# Patient Record
Sex: FEMALE | Race: WHITE | Employment: FULL TIME | URBAN - METROPOLITAN AREA
[De-identification: names, ages, dates, MRNs, and addresses within clinical notes are randomized per-mention and may not be internally consistent; named-entity substitution may affect disease eponyms.]

---

## 2017-01-04 ENCOUNTER — RADIANT APPOINTMENT (OUTPATIENT)
Dept: GENERAL RADIOLOGY | Facility: CLINIC | Age: 57
End: 2017-01-04
Attending: NURSE PRACTITIONER
Payer: COMMERCIAL

## 2017-01-04 ENCOUNTER — OFFICE VISIT (OUTPATIENT)
Dept: FAMILY MEDICINE | Facility: CLINIC | Age: 57
End: 2017-01-04
Payer: COMMERCIAL

## 2017-01-04 VITALS
OXYGEN SATURATION: 98 % | WEIGHT: 236 LBS | TEMPERATURE: 97.8 F | BODY MASS INDEX: 35.77 KG/M2 | HEART RATE: 74 BPM | RESPIRATION RATE: 18 BRPM | SYSTOLIC BLOOD PRESSURE: 151 MMHG | DIASTOLIC BLOOD PRESSURE: 85 MMHG | HEIGHT: 68 IN

## 2017-01-04 DIAGNOSIS — R05.9 COUGH: ICD-10-CM

## 2017-01-04 PROCEDURE — 71020 XR CHEST 2 VW: CPT

## 2017-01-04 PROCEDURE — 99214 OFFICE O/P EST MOD 30 MIN: CPT | Performed by: NURSE PRACTITIONER

## 2017-01-04 RX ORDER — SUMATRIPTAN 100 MG/1
TABLET, FILM COATED ORAL
Qty: 12 TABLET | Refills: 1 | Status: CANCELLED | OUTPATIENT
Start: 2017-01-04

## 2017-01-04 RX ORDER — CODEINE PHOSPHATE AND GUAIFENESIN 10; 100 MG/5ML; MG/5ML
1-2 SOLUTION ORAL EVERY 6 HOURS PRN
Qty: 120 ML | Refills: 0 | Status: SHIPPED | OUTPATIENT
Start: 2017-01-04 | End: 2017-07-07

## 2017-01-04 RX ORDER — CODEINE PHOSPHATE AND GUAIFENESIN 10; 100 MG/5ML; MG/5ML
1-2 SOLUTION ORAL EVERY 4 HOURS PRN
Qty: 420 ML | Status: CANCELLED | OUTPATIENT
Start: 2017-01-04

## 2017-01-04 RX ORDER — IPRATROPIUM BROMIDE AND ALBUTEROL SULFATE 2.5; .5 MG/3ML; MG/3ML
1 SOLUTION RESPIRATORY (INHALATION) ONCE
Qty: 1 VIAL | Refills: 0
Start: 2017-01-04 | End: 2017-07-07

## 2017-01-04 NOTE — NURSING NOTE
"Chief Complaint   Patient presents with     URI       Initial /85 mmHg  Pulse 74  Temp(Src) 97.8  F (36.6  C) (Tympanic)  Resp 18  Ht 5' 8\" (1.727 m)  Wt 236 lb (107.049 kg)  BMI 35.89 kg/m2  SpO2 98% Estimated body mass index is 35.89 kg/(m^2) as calculated from the following:    Height as of this encounter: 5' 8\" (1.727 m).    Weight as of this encounter: 236 lb (107.049 kg).  BP completed using cuff size: large; Right Arm  Alissa Bain CMA (AAMA)      "

## 2017-01-04 NOTE — PROGRESS NOTES
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    Precipitating or alleviating factors: None    Therapies tried and outcome:  guaifenesin        started coughing   Fatigue   Cough is severe  Sneezing  Watery eyes      Past Medical History   Diagnosis Date     Hypothyroidism      Lower extremity edema      Asthma      Seasonal allergies      S/P JOSEP (total abdominal hysterectomy)      S/P  section      S/P appendectomy      S/P arthroscopic knee surgery      S/P lateral meniscectomy of right knee      Cellulitis      s/p I&D of wound     Hypertension      Melanoma (H) 2015     Past Surgical History   Procedure Laterality Date     Hysterectomy, pap no longer indicated       Appendectomy       Colonoscopy N/A 2016     Procedure: COLONOSCOPY;  Surgeon: Axel Rivera MD;  Location:  GI     Social History   Substance Use Topics     Smoking status: Light Tobacco Smoker     Smokeless tobacco: Never Used     Alcohol Use: 0.0 oz/week     0 Standard drinks or equivalent per week      Comment: rare use     Current Outpatient Prescriptions   Medication Sig Dispense Refill     AMITIZA 24 MCG capsule TAKE 1 CAPSULE BY MOUTH TWICE DAILY WITH MEALS 180 capsule 2     losartan (COZAAR) 100 MG tablet TAKE 1 TABLET BY MOUTH DAILY 90 tablet 1     SUMAtriptan (IMITREX) 100 MG tablet TAKE 1 TABLET BY MOUTH AT ONSET OF HEADACHE AS DIRECTED 12 tablet 1     ondansetron (ZOFRAN) 4 MG tablet Take 1 tablet (4 mg) by  "mouth every 8 hours as needed for nausea 18 tablet 1     hydrochlorothiazide (HYDRODIURIL) 25 MG tablet Take 1 tablet (25 mg) by mouth daily 30 tablet 11     losartan (COZAAR) 100 MG tablet Take 1 tablet (100 mg) by mouth daily 30 tablet 11     budesonide (PULMICORT FLEXHALER) 180 MCG/ACT inhaler Inhale 2 puffs into the lungs 2 times daily 3 Inhaler 3     albuterol (PROAIR HFA, PROVENTIL HFA, VENTOLIN HFA) 108 (90 BASE) MCG/ACT inhaler Inhale 2 puffs into the lungs every 6 hours as needed for shortness of breath / dyspnea or wheezing 3 Inhaler 3     levothyroxine (SYNTHROID, LEVOTHROID) 150 MCG tablet Take 1 tablet (150 mcg) by mouth daily       cyclobenzaprine (FLEXERIL) 5 MG tablet Take 1 tablet (5 mg) by mouth 3 times daily as needed for muscle spasms 42 tablet 2     ibuprofen (ADVIL) 200 MG tablet Take 200 mg by mouth every 4 hours as needed.       MULTIVITAMIN TABS   OR daily  0     CALCIUM 500 + D 500-200 MG-IU OR TABS bid  0     Allergies   Allergen Reactions     Singulair [Montelukast Sodium]      Back aches       Reviewed PMH, med list and allergies.      ROS  Detailed as above     /85 mmHg  Pulse 74  Temp(Src) 97.8  F (36.6  C) (Tympanic)  Resp 18  Ht 5' 8\" (1.727 m)  Wt 236 lb (107.049 kg)  BMI 35.89 kg/m2  SpO2 98%      Physical Exam   Constitutional: She is well-developed, well-nourished, and in no distress.   HENT:   Head: Normocephalic.   Right Ear: Tympanic membrane, external ear and ear canal normal.   Left Ear: Tympanic membrane, external ear and ear canal normal.   Mouth/Throat: Oropharynx is clear and moist. No oropharyngeal exudate.   Posterior oropharynx cobblestoning  Nares edema   Eyes: Conjunctivae are normal.   Neck: Normal range of motion.   Cardiovascular: Normal rate, regular rhythm and normal heart sounds.    No murmur heard.  Pulmonary/Chest: Effort normal and breath sounds normal. No respiratory distress.   Cough noted   Lymphadenopathy:     She has cervical adenopathy. "   Neurological: She is alert.   Skin: Skin is warm and dry.   Psychiatric: Mood and affect normal.   Vitals reviewed.      Assessment and Plan:       ICD-10-CM    1. Cough R05 XR Chest 2 Views     ipratropium - albuterol 0.5 mg/2.5 mg/3 mL (DUONEB) 0.5-2.5 (3) MG/3ML neb solution     guaiFENesin-codeine (ROBITUSSIN AC) 100-10 MG/5ML SOLN solution       Neg CXR, reviewed by me, awaiting rad read   Given neb here in clinic   Suspect this is viral. Will watch and wait   Continue with albuterol inh prn   Symptomatic treatments with OTC cold meds prn   Call or rtc prn       MAURICIO Baker, CNP  [facility]

## 2017-01-31 ENCOUNTER — MYC MEDICAL ADVICE (OUTPATIENT)
Dept: FAMILY MEDICINE | Facility: CLINIC | Age: 57
End: 2017-01-31

## 2017-01-31 DIAGNOSIS — G43.009 MIGRAINE WITHOUT AURA AND WITHOUT STATUS MIGRAINOSUS, NOT INTRACTABLE: Primary | ICD-10-CM

## 2017-01-31 DIAGNOSIS — G43.109 CLASSICAL MIGRAINE: Primary | ICD-10-CM

## 2017-02-01 RX ORDER — SUMATRIPTAN 100 MG/1
TABLET, FILM COATED ORAL
Qty: 12 TABLET | Refills: 0 | Status: SHIPPED | OUTPATIENT
Start: 2017-02-01 | End: 2017-02-09

## 2017-02-01 NOTE — TELEPHONE ENCOUNTER
SUMAtriptan (IMITREX) 100 MG tablet       Last Written Prescription Date: 10/25/2016  Last Fill Quantity: 12, # refills: 1  Last Office Visit with [facility], [facility] or [facility] prescribing provider: 8/15/2016       BP Readings from Last 3 Encounters:   01/04/17 151/85   08/15/16 121/82   02/29/16 126/79

## 2017-02-01 NOTE — TELEPHONE ENCOUNTER
Team,  Please see below.  Does PA need to be done to override quantity?  Please advise.  Marcia CHOPRA RN

## 2017-02-01 NOTE — TELEPHONE ENCOUNTER
Prescription approved per [facility] Refill Protocol.  Zulma Vazquez RN

## 2017-02-04 NOTE — TELEPHONE ENCOUNTER
Possibly needs quantity limit exception. I sent in PA for the quantity to [employer].    Bladimir Deras, CMA

## 2017-02-09 PROBLEM — G43.009 MIGRAINE WITHOUT AURA AND WITHOUT STATUS MIGRAINOSUS, NOT INTRACTABLE: Status: ACTIVE | Noted: 2017-02-09

## 2017-02-09 RX ORDER — SUMATRIPTAN 100 MG/1
TABLET, FILM COATED ORAL
Qty: 9 TABLET | Refills: 15 | Status: SHIPPED | OUTPATIENT
Start: 2017-02-09 | End: 2018-01-09

## 2017-02-09 NOTE — TELEPHONE ENCOUNTER
So PA for increased quantity has been denied. Her benefit plan allows for up to 9 tablets per 23 days and they will not exceed this. I wonder if we should just write a new script that coincides with the insurance quantity limits for 9 tablets instead of 12?    I have a new script pending for the pharmacy that will correct the disp # to 9 tablets, if this would be ok to do please send to patient's [facility] pharmacy.    Bladimir Deras, CMA

## 2017-03-23 ENCOUNTER — TRANSFERRED RECORDS (OUTPATIENT)
Dept: HEALTH INFORMATION MANAGEMENT | Facility: CLINIC | Age: 57
End: 2017-03-23

## 2017-03-28 DIAGNOSIS — I10 BENIGN ESSENTIAL HYPERTENSION: ICD-10-CM

## 2017-03-28 RX ORDER — HYDROCHLOROTHIAZIDE 25 MG/1
25 TABLET ORAL DAILY
Start: 2017-03-28

## 2017-03-28 NOTE — TELEPHONE ENCOUNTER
Pending Prescriptions:                       Disp   Refills    hydrochlorothiazide (HYDRODIURIL) 25 MG t*30 tab*11           Sig: Take 1 tablet (25 mg) by mouth daily          Last Written Prescription Date: 8/15/16  Last Fill Quantity: 30, # refills: 11  Last Office Visit with [facility], [facility] or [facility] prescribing provider: 1/4/17       Potassium   Date Value Ref Range Status   12/02/2015 3.8 3.4 - 5.3 mmol/L Final     Creatinine   Date Value Ref Range Status   12/02/2015 0.81 0.52 - 1.04 mg/dL Final     BP Readings from Last 3 Encounters:   01/04/17 151/85   08/15/16 121/82   02/29/16 126/79

## 2017-05-17 ENCOUNTER — TRANSFERRED RECORDS (OUTPATIENT)
Dept: HEALTH INFORMATION MANAGEMENT | Facility: CLINIC | Age: 57
End: 2017-05-17

## 2017-06-02 ENCOUNTER — TELEPHONE (OUTPATIENT)
Dept: FAMILY MEDICINE | Facility: CLINIC | Age: 57
End: 2017-06-02

## 2017-06-02 DIAGNOSIS — I10 ESSENTIAL HYPERTENSION: ICD-10-CM

## 2017-06-02 RX ORDER — LOSARTAN POTASSIUM 100 MG/1
TABLET ORAL
Qty: 90 TABLET | Refills: 2 | Status: SHIPPED | OUTPATIENT
Start: 2017-06-02 | End: 2017-07-07

## 2017-06-02 NOTE — TELEPHONE ENCOUNTER
Pending Prescriptions:                       Disp   Refills    losartan (COZAAR) 100 MG tablet [Pharmacy*90 tab*0            Sig: TAKE 1 TABLET BY MOUTH DAILY          Last Written Prescription Date: 11/8/16  Last Fill Quantity: 90, # refills: 1  Last Office Visit with [facility], [facility] or [facility] prescribing provider: 1/4/17       Potassium   Date Value Ref Range Status   12/02/2015 3.8 3.4 - 5.3 mmol/L Final     Creatinine   Date Value Ref Range Status   12/02/2015 0.81 0.52 - 1.04 mg/dL Final     BP Readings from Last 3 Encounters:   01/04/17 151/85   08/15/16 121/82   02/29/16 126/79

## 2017-06-02 NOTE — TELEPHONE ENCOUNTER
TCs: Patient's medication has been approved.  Please call the patient and let her know that she is due for Basic Metabolic Panel lab and schedule her for LAB ONLY appointment    Thank you!    Meg August RN

## 2017-06-02 NOTE — TELEPHONE ENCOUNTER
Routing refill request to provider for review/approval because:  Labs not current:  Potassium and Creat are from 2015.  Would you like patient to come in for Potassium and Creat Lab Only.   Please see pended medication.     Meg August RN

## 2017-07-07 ENCOUNTER — OFFICE VISIT (OUTPATIENT)
Dept: FAMILY MEDICINE | Facility: CLINIC | Age: 57
End: 2017-07-07
Payer: COMMERCIAL

## 2017-07-07 VITALS
SYSTOLIC BLOOD PRESSURE: 137 MMHG | BODY MASS INDEX: 34.4 KG/M2 | HEART RATE: 72 BPM | TEMPERATURE: 97.6 F | HEIGHT: 68 IN | OXYGEN SATURATION: 97 % | DIASTOLIC BLOOD PRESSURE: 86 MMHG | WEIGHT: 227 LBS

## 2017-07-07 DIAGNOSIS — M54.2 CERVICALGIA: ICD-10-CM

## 2017-07-07 DIAGNOSIS — I10 BENIGN ESSENTIAL HYPERTENSION: ICD-10-CM

## 2017-07-07 DIAGNOSIS — M79.602 ARM PAIN, LEFT: ICD-10-CM

## 2017-07-07 DIAGNOSIS — I10 ESSENTIAL HYPERTENSION: ICD-10-CM

## 2017-07-07 DIAGNOSIS — J45.31 EXTRINSIC ASTHMA, MILD PERSISTENT, WITH ACUTE EXACERBATION: ICD-10-CM

## 2017-07-07 LAB
ANION GAP SERPL CALCULATED.3IONS-SCNC: 8 MMOL/L (ref 3–14)
BUN SERPL-MCNC: 12 MG/DL (ref 7–30)
CALCIUM SERPL-MCNC: 9.5 MG/DL (ref 8.5–10.1)
CHLORIDE SERPL-SCNC: 106 MMOL/L (ref 94–109)
CO2 SERPL-SCNC: 27 MMOL/L (ref 20–32)
CREAT SERPL-MCNC: 0.73 MG/DL (ref 0.52–1.04)
GFR SERPL CREATININE-BSD FRML MDRD: 82 ML/MIN/1.7M2
GLUCOSE SERPL-MCNC: 113 MG/DL (ref 70–99)
POTASSIUM SERPL-SCNC: 3.7 MMOL/L (ref 3.4–5.3)
SODIUM SERPL-SCNC: 141 MMOL/L (ref 133–144)

## 2017-07-07 PROCEDURE — 80048 BASIC METABOLIC PNL TOTAL CA: CPT | Performed by: INTERNAL MEDICINE

## 2017-07-07 PROCEDURE — 99213 OFFICE O/P EST LOW 20 MIN: CPT | Performed by: INTERNAL MEDICINE

## 2017-07-07 PROCEDURE — 36415 COLL VENOUS BLD VENIPUNCTURE: CPT | Performed by: INTERNAL MEDICINE

## 2017-07-07 RX ORDER — HYDROCHLOROTHIAZIDE 25 MG/1
25 TABLET ORAL DAILY
Qty: 30 TABLET | Refills: 11 | Status: SHIPPED | OUTPATIENT
Start: 2017-07-07 | End: 2018-01-09

## 2017-07-07 RX ORDER — CYCLOBENZAPRINE HCL 5 MG
5 TABLET ORAL 3 TIMES DAILY PRN
Qty: 42 TABLET | Refills: 2 | Status: SHIPPED | OUTPATIENT
Start: 2017-07-07 | End: 2018-01-02

## 2017-07-07 RX ORDER — ALBUTEROL SULFATE 90 UG/1
2 AEROSOL, METERED RESPIRATORY (INHALATION) EVERY 6 HOURS PRN
Qty: 3 INHALER | Refills: 3 | Status: SHIPPED | OUTPATIENT
Start: 2017-07-07 | End: 2018-01-09

## 2017-07-07 RX ORDER — LOSARTAN POTASSIUM 100 MG/1
TABLET ORAL
Qty: 30 TABLET | Refills: 11 | Status: SHIPPED | OUTPATIENT
Start: 2017-07-07 | End: 2018-01-09

## 2017-07-07 NOTE — MR AVS SNAPSHOT
After Visit Summary   7/7/2017    Rhoda Ayala    MRN: [id number]           Patient Information     Date Of Birth          1960        Visit Information        Provider Department      7/7/2017 3:30 PM Fiil Vasquez MD [facility]        Today's Diagnoses     Cervicalgia        Arm pain, left        Benign essential hypertension        Essential hypertension        Extrinsic asthma, mild persistent, with acute exacerbation          Care Instructions    (M54.2) Cervicalgia  Comment: OK to continue flexeril as needed   Plan: cyclobenzaprine (FLEXERIL) 5 MG tablet            (M79.602) Arm pain, left  Comment: as above   Plan: cyclobenzaprine (FLEXERIL) 5 MG tablet            (I10) Benign essential hypertension  Comment: blood pressure is very good - continue current blood pressure medications - losartan and hydrochlorothiazide   Plan:     (I10) Essential hypertension  Comment: as above   Plan:     (J45.31) Extrinsic asthma, mild persistent, with acute exacerbation  Comment: Refill inhalers today -lungs clear  Plan:                Follow-ups after your visit        Who to contact     If you have questions or need follow up information about today's clinic visit or your schedule please contact [facility] directly at [phone].  Normal or non-critical lab and imaging results will be communicated to you by MyChart, letter or phone within 4 business days after the clinic has received the results. If you do not hear from us within 7 days, please contact the clinic through [facility] or phone. If you have a critical or abnormal lab result, we will notify you by phone as soon as possible.  Submit refill requests through [employer] or call your pharmacy and they will forward the refill request to us. Please allow 3 business days for your refill to be completed.          Additional Information About Your Visit        [employer] Information     [employer] gives you secure access to your  "electronic health record. If you see a primary care provider, you can also send messages to your care team and make appointments. If you have questions, please call your primary care clinic.  If you do not have a primary care provider, please call [phone] and they will assist you.        Care EveryWhere ID     This is your Care EveryWhere ID. This could be used by other organizations to access your [facility] medical records  [id number]        Your Vitals Were     Pulse Temperature Height Pulse Oximetry Breastfeeding? BMI (Body Mass Index)    72 97.6  F (36.4  C) (Tympanic) 5' 8\" (1.727 m) 97% No 34.52 kg/m2       Blood Pressure from Last 3 Encounters:   07/07/17 137/86   01/04/17 151/85   08/15/16 121/82    Weight from Last 3 Encounters:   07/07/17 227 lb (103 kg)   01/04/17 236 lb (107 kg)   08/15/16 238 lb 14.4 oz (108.4 kg)              We Performed the Following     Basic metabolic panel          Today's Medication Changes          These changes are accurate as of: 7/7/17  4:06 PM.  If you have any questions, ask your nurse or doctor.               These medicines have changed or have updated prescriptions.        Dose/Directions    losartan 100 MG tablet   Commonly known as:  COZAAR   This may have changed:  Another medication with the same name was removed. Continue taking this medication, and follow the directions you see here.   Used for:  Essential hypertension   Changed by:  Fili Vasquez MD        TAKE 1 TABLET BY MOUTH DAILY. Patient due for labs (potassium and creatinine). Please call to schedule lab only appointment.   Quantity:  30 tablet   Refills:  11            Where to get your medicines      These medications were sent to [facility] [address] [facility] [address] AT [place] [address]  [address] [place] [id number]    Hours:  24-hours Phone:  [phone]     albuterol 108 (90 BASE) MCG/ACT Inhaler    budesonide 180 MCG/ACT inhaler "    cyclobenzaprine 5 MG tablet    hydrochlorothiazide 25 MG tablet    losartan 100 MG tablet                Primary Care Provider Office Phone # Fax #    Fili Vasquez [phone]       [facility] [address] [facility] [address]  [facility] [address]        Equal Access to Services     INGRID NEGRETE : Hadii aad ku hadasho Soomaali, waaxda luqadaha, qaybta kaalmada adeegyada, waxay aurelioin haynallelyn adeclarence tonjose guadalupe sanderson. So [facility] [phone].    ATENCIÓN: Si habla español, tiene a xiong disposición servicios gratuitos de asistencia lingüística. [facility] [phone].    We comply with applicable federal civil rights laws and [place] laws. We do not discriminate on the basis of race, color, national origin, age, disability sex, sexual orientation or gender identity.            Thank you!     Thank you for choosing [facility]  for your care. Our goal is always to provide you with excellent care. Hearing back from our patients is one way we can continue to improve our services. Please take a few minutes to complete the written survey that you may receive in the mail after your visit with us. Thank you!             Your Updated Medication List - Protect others around you: Learn how to safely use, store and throw away your medicines at www.disposemymeds.org.          This list is accurate as of: 7/7/17  4:06 PM.  Always use your most recent med list.                   Brand Name Dispense Instructions for use Diagnosis    ADVIL 200 MG tablet   Generic drug:  ibuprofen      Take 200 mg by mouth every 4 hours as needed.        albuterol 108 (90 BASE) MCG/ACT Inhaler    PROAIR HFA/PROVENTIL HFA/VENTOLIN HFA    3 Inhaler    Inhale 2 puffs into the lungs every 6 hours as needed for shortness of breath / dyspnea or wheezing    Extrinsic asthma, mild persistent, with acute exacerbation       AMITIZA 24 MCG capsule   Generic drug:  lubiprostone     180 capsule    TAKE 1 CAPSULE BY MOUTH TWICE  DAILY WITH MEALS    Irritable bowel syndrome, unspecified type       budesonide 180 MCG/ACT inhaler    PULMICORT FLEXHALER    3 Inhaler    Inhale 2 puffs into the lungs 2 times daily    Extrinsic asthma, mild persistent, with acute exacerbation       CALCIUM 500 + D 500-200 MG-IU Tabs      bid        cyclobenzaprine 5 MG tablet    FLEXERIL    42 tablet    Take 1 tablet (5 mg) by mouth 3 times daily as needed for muscle spasms    Cervicalgia, Arm pain, left       hydrochlorothiazide 25 MG tablet    HYDRODIURIL    30 tablet    Take 1 tablet (25 mg) by mouth daily    Benign essential hypertension       losartan 100 MG tablet    COZAAR    30 tablet    TAKE 1 TABLET BY MOUTH DAILY. Patient due for labs (potassium and creatinine). Please call to schedule lab only appointment.    Essential hypertension       MULTIVITAMIN TABS   OR      daily        ondansetron 4 MG tablet    ZOFRAN    18 tablet    Take 1 tablet (4 mg) by mouth every 8 hours as needed for nausea    Nausea       SUMAtriptan 100 MG tablet    IMITREX    9 tablet    TAKE 1 TABLET BY MOUTH AT ONSET OF HEADACHE AS DIRECTED - 9 tablets to last 23 days    Migraine without aura and without status migrainosus, not intractable       SYNTHROID 150 MCG tablet   Generic drug:  levothyroxine      Take 1 tablet (150 mcg) by mouth daily

## 2017-07-07 NOTE — NURSING NOTE
"Chief Complaint   Patient presents with     Hypertension       Initial /86 (BP Location: Left arm, Patient Position: Sitting, Cuff Size: Adult Large)  Pulse 72  Temp 97.6  F (36.4  C) (Tympanic)  Ht 5' 8\" (1.727 m)  Wt 227 lb (103 kg)  SpO2 97%  Breastfeeding? No  BMI 34.52 kg/m2 Estimated body mass index is 34.52 kg/(m^2) as calculated from the following:    Height as of this encounter: 5' 8\" (1.727 m).    Weight as of this encounter: 227 lb (103 kg).  Medication Reconciliation: complete   Karmen Gallegosing- CMA      "

## 2017-07-07 NOTE — PROGRESS NOTES
"[facility]  CLINIC PROGRESS NOTE    Subjective:   Hypothyroidism   Has had improvement with recent dose increase - following with endocrinology  Hypertension    Rhoda Ayala returns to the clinic for follow up of her blood pressure.  She feels well and no side effects from her medications.  She is walking regularly and is on her feet regularly.     Past medical history, medications, allergies, social history, family history reviewed and updated in [facility] as of 7/7/2017 .    ROS  CONSTITUTIONAL: no fatigue, no unexpected change in weight  SKIN: no concerns  EYES: no acute vision problems or changes  ENT: occasional allergies   RESP: no significant cough, no shortness of breath  CV: no chest pain, no palpitations, no new or worsening peripheral edema  GI: no nausea, no vomiting, no constipation, no diarrhea  : no frequency, no dysuria, no hematuria  MS: right shoulder/subscapular pain - may be associated with  or shoveling   PSYCHIATRIC: no changes in mood or affect      Objective:  Vitals  /86 (BP Location: Left arm, Patient Position: Sitting, Cuff Size: Adult Large)  Pulse 72  Temp 97.6  F (36.4  C) (Tympanic)  Ht 5' 8\" (1.727 m)  Wt 227 lb (103 kg)  SpO2 97%  Breastfeeding? No  BMI 34.52 kg/m2  GEN: Alert Oriented x3 NAD  HEENT: Atraumatic, normocephalic, neck supple, no thyromegaly, negative cervical adenopathy  TM: TM bilaterally pearly and grey with normal light reflex  CV: RRR no murmurs or rubs  PULM: CTA no wheezes or crackles  ABD: Soft, nontender nondistended, no hepatosplenomegally  SKIN: No visible skin lesion or ulcerations  EXT: 2+ dorsal pedis pulses, no edema bilateral lower extremities  NEURO: Gait and station deferred, No focal neurologic deficits  PSYCH: Mood good, affect mood congruent    No results found for this or any previous visit (from the past 24 hour(s)).    Assessment/Plan:  Patient Instructions   (M54.2) Cervicalgia  Comment: OK to continue flexeril as " needed   Plan: cyclobenzaprine (FLEXERIL) 5 MG tablet            (M79.602) Arm pain, left  Comment: as above   Plan: cyclobenzaprine (FLEXERIL) 5 MG tablet            (I10) Benign essential hypertension  Comment: blood pressure is very good - continue current blood pressure medications - losartan and hydrochlorothiazide   Plan:     (I10) Essential hypertension  Comment: as above   Plan:     (J45.31) Extrinsic asthma, mild persistent, with acute exacerbation  Comment: Refill inhalers today -lungs clear  Plan:          Follow up in 3 months    Disclaimer: This note consists of symbols derived from keyboarding, dictation and/or voice recognition software. As a result, there may be errors in the script that have gone undetected. Please consider this when interpreting information found in this chart.    Fili Vasquez MD  [phone]

## 2017-07-07 NOTE — PATIENT INSTRUCTIONS
(M54.2) Cervicalgia  Comment: OK to continue flexeril as needed   Plan: cyclobenzaprine (FLEXERIL) 5 MG tablet            (M79.602) Arm pain, left  Comment: as above   Plan: cyclobenzaprine (FLEXERIL) 5 MG tablet            (I10) Benign essential hypertension  Comment: blood pressure is very good - continue current blood pressure medications - losartan and hydrochlorothiazide   Plan:     (I10) Essential hypertension  Comment: as above   Plan:     (J45.31) Extrinsic asthma, mild persistent, with acute exacerbation  Comment: Refill inhalers today -lungs clear  Plan:

## 2017-07-09 NOTE — PROGRESS NOTES
Onesimo Duong,    I have had the opportunity to review your recent results and an interpretation is as follows:  Stable renal function     Sincerely,  Fili Vasquez MD

## 2017-07-11 ENCOUNTER — TELEPHONE (OUTPATIENT)
Dept: FAMILY MEDICINE | Facility: CLINIC | Age: 57
End: 2017-07-11

## 2017-07-11 NOTE — TELEPHONE ENCOUNTER
ACT Asthma Control Test not done at recent appt per weekly quality report.  Mailed to pt.  Unable to do over the phone due to copyright laws.  Christine Weller MA

## 2017-07-11 NOTE — LETTER
"[facility]  [address] [facility]  [address]  [place]  [address]  Tel: [phone]    July 11, 2017    Rhoda Ayala  [address] [place] [address]        Dear Ms. Ayala,    We missed doing your Asthma Control Test when you were in for your recent appointment.     Please complete the enclosed \"ACT\"and either     mail back to us  or  fax [phone] back to us  or  call [phone] us with your answers (ask for any of the nurses/ med assist).     Due to copyright laws we are unable to ask you the questions over the phone without the form visible to you.         Sincerely,    Christine NYE MA on behalf of Fili Vasquez MD        Enc: ACT form      "

## 2017-07-18 ASSESSMENT — ASTHMA QUESTIONNAIRES: ACT_TOTALSCORE: 21

## 2017-08-26 ENCOUNTER — HEALTH MAINTENANCE LETTER (OUTPATIENT)
Age: 57
End: 2017-08-26

## 2017-08-30 DIAGNOSIS — I10 BENIGN ESSENTIAL HYPERTENSION: ICD-10-CM

## 2017-08-30 RX ORDER — HYDROCHLOROTHIAZIDE 25 MG/1
TABLET ORAL
Qty: 30 TABLET | Refills: 0 | OUTPATIENT
Start: 2017-08-30

## 2017-09-06 ENCOUNTER — TRANSFERRED RECORDS (OUTPATIENT)
Dept: HEALTH INFORMATION MANAGEMENT | Facility: CLINIC | Age: 57
End: 2017-09-06

## 2018-01-02 DIAGNOSIS — M79.602 ARM PAIN, LEFT: ICD-10-CM

## 2018-01-02 DIAGNOSIS — M54.2 CERVICALGIA: ICD-10-CM

## 2018-01-02 NOTE — TELEPHONE ENCOUNTER
Requested Prescriptions   Pending Prescriptions Disp Refills     cyclobenzaprine (FLEXERIL) 5 MG tablet [Pharmacy Med Name: CYCLOBENZAPRINE 5MG TABLETS] 42 tablet 0     Sig: TAKE 1 TABLET(5 MG) BY MOUTH THREE TIMES DAILY AS NEEDED FOR MUSCLE SPASMS    There is no refill protocol information for this order        cyclobenzaprine (FLEXERIL) 5 MG tablet      Last Written Prescription Date:  7/07/17  Last Fill Quantity: 42 tablet,   # refills: 2  Last Office Visit: 7/07/17 Christina  Future Office visit:       Routing refill request to provider for review/approval because:  Drug not on the [facility], P or [facility] refill protocol or controlled substance

## 2018-01-03 RX ORDER — CYCLOBENZAPRINE HCL 5 MG
TABLET ORAL
Qty: 42 TABLET | Refills: 0 | Status: SHIPPED | OUTPATIENT
Start: 2018-01-03 | End: 2018-05-20

## 2018-01-03 NOTE — TELEPHONE ENCOUNTER
Routing to TC to contact patient to inform due for appointment. (last OV 7/7/17, follow up 3 months). Please route to PCP once scheduled.     (Routing refill request to provider for review/approval because: Drug not on the [facility] refill protocol)    Myrna UGALDE RN

## 2018-01-09 ENCOUNTER — OFFICE VISIT (OUTPATIENT)
Dept: FAMILY MEDICINE | Facility: CLINIC | Age: 58
End: 2018-01-09
Payer: COMMERCIAL

## 2018-01-09 VITALS
OXYGEN SATURATION: 94 % | TEMPERATURE: 98 F | WEIGHT: 227 LBS | DIASTOLIC BLOOD PRESSURE: 80 MMHG | BODY MASS INDEX: 34.4 KG/M2 | SYSTOLIC BLOOD PRESSURE: 132 MMHG | HEIGHT: 68 IN | HEART RATE: 66 BPM

## 2018-01-09 DIAGNOSIS — E03.9 HYPOTHYROIDISM, UNSPECIFIED TYPE: ICD-10-CM

## 2018-01-09 DIAGNOSIS — J45.31 EXTRINSIC ASTHMA, MILD PERSISTENT, WITH ACUTE EXACERBATION: ICD-10-CM

## 2018-01-09 DIAGNOSIS — J45.21 MILD INTERMITTENT EXTRINSIC ASTHMA WITH ACUTE EXACERBATION: ICD-10-CM

## 2018-01-09 DIAGNOSIS — Z13.6 CARDIOVASCULAR SCREENING; LDL GOAL LESS THAN 130: Primary | ICD-10-CM

## 2018-01-09 DIAGNOSIS — I10 ESSENTIAL HYPERTENSION: ICD-10-CM

## 2018-01-09 DIAGNOSIS — G43.009 MIGRAINE WITHOUT AURA AND WITHOUT STATUS MIGRAINOSUS, NOT INTRACTABLE: ICD-10-CM

## 2018-01-09 DIAGNOSIS — I10 BENIGN ESSENTIAL HYPERTENSION: ICD-10-CM

## 2018-01-09 LAB
ERYTHROCYTE [DISTWIDTH] IN BLOOD BY AUTOMATED COUNT: 13.4 % (ref 10–15)
HCT VFR BLD AUTO: 39.2 % (ref 35–47)
HGB BLD-MCNC: 13.3 G/DL (ref 11.7–15.7)
MCH RBC QN AUTO: 30.3 PG (ref 26.5–33)
MCHC RBC AUTO-ENTMCNC: 33.9 G/DL (ref 31.5–36.5)
MCV RBC AUTO: 89 FL (ref 78–100)
PLATELET # BLD AUTO: 336 10E9/L (ref 150–450)
RBC # BLD AUTO: 4.39 10E12/L (ref 3.8–5.2)
WBC # BLD AUTO: 10.5 10E9/L (ref 4–11)

## 2018-01-09 PROCEDURE — 80053 COMPREHEN METABOLIC PANEL: CPT | Performed by: INTERNAL MEDICINE

## 2018-01-09 PROCEDURE — 36415 COLL VENOUS BLD VENIPUNCTURE: CPT | Performed by: INTERNAL MEDICINE

## 2018-01-09 PROCEDURE — 85027 COMPLETE CBC AUTOMATED: CPT | Performed by: INTERNAL MEDICINE

## 2018-01-09 PROCEDURE — 80061 LIPID PANEL: CPT | Performed by: INTERNAL MEDICINE

## 2018-01-09 PROCEDURE — 99213 OFFICE O/P EST LOW 20 MIN: CPT | Performed by: INTERNAL MEDICINE

## 2018-01-09 RX ORDER — ALBUTEROL SULFATE 90 UG/1
2 AEROSOL, METERED RESPIRATORY (INHALATION) EVERY 6 HOURS PRN
Qty: 1 INHALER | Refills: 11 | Status: SHIPPED | OUTPATIENT
Start: 2018-01-09 | End: 2019-02-24

## 2018-01-09 RX ORDER — LEVOTHYROXINE SODIUM 150 UG/1
175 TABLET ORAL
COMMUNITY
End: 2018-12-05

## 2018-01-09 RX ORDER — HYDROCHLOROTHIAZIDE 25 MG/1
25 TABLET ORAL DAILY
Qty: 30 TABLET | Refills: 11 | Status: SHIPPED | OUTPATIENT
Start: 2018-01-09 | End: 2018-12-21

## 2018-01-09 RX ORDER — LOSARTAN POTASSIUM 100 MG/1
TABLET ORAL
Qty: 30 TABLET | Refills: 11 | Status: SHIPPED | OUTPATIENT
Start: 2018-01-09 | End: 2018-12-21

## 2018-01-09 RX ORDER — SUMATRIPTAN 100 MG/1
TABLET, FILM COATED ORAL
Qty: 9 TABLET | Refills: 15 | Status: SHIPPED | OUTPATIENT
Start: 2018-01-09 | End: 2019-01-10

## 2018-01-09 NOTE — MR AVS SNAPSHOT
After Visit Summary   1/9/2018    Rhoda Ayala    MRN: [id number]           Patient Information     Date Of Birth          1960        Visit Information        Provider Department      1/9/2018 11:30 AM Fili Vasquez MD [facility]        Today's Diagnoses     CARDIOVASCULAR SCREENING; LDL GOAL LESS THAN 130    -  1    Extrinsic asthma, mild persistent, with acute exacerbation        Migraine without aura and without status migrainosus, not intractable        Benign essential hypertension        Essential hypertension        Hypothyroidism, unspecified type        Mild intermittent extrinsic asthma with acute exacerbation          Care Instructions    (Z13.6) CARDIOVASCULAR SCREENING; LDL GOAL LESS THAN 130  (primary encounter diagnosis)  Comment: We will check fasting lipid panel today and manage accordingly  Plan: Lipid panel reflex to direct LDL Fasting, CBC         with platelets            (J45.31) Extrinsic asthma, mild persistent, with acute exacerbation  Comment: Lungs were clear today - continue current inhalers  Plan: budesonide (PULMICORT FLEXHALER) 180 MCG/ACT         inhaler, albuterol (PROAIR HFA/PROVENTIL         HFA/VENTOLIN HFA) 108 (90 BASE) MCG/ACT Inhaler            (G43.009) Migraine without aura and without status migrainosus, not intractable  Comment: also stable - no change in dosing of immitrex   Plan: SUMAtriptan (IMITREX) 100 MG tablet            (I10) Benign essential hypertension  Comment: blood pressure is excellent - continue hydrochlorothiazide and losartan  Plan: hydrochlorothiazide (HYDRODIURIL) 25 MG tablet            (I10) Essential hypertension  Comment: as above   Plan: losartan (COZAAR) 100 MG tablet, Comprehensive         metabolic panel, CBC with platelets            (E03.9) Hypothyroidism, unspecified type  Comment: check TSH with endocrinology   Plan:     (J45.21) Mild intermittent extrinsic asthma with acute exacerbation  Comment:  "Stable - no acute issues  Plan:              Follow-ups after your visit        Who to contact     If you have questions or need follow up information about today's clinic visit or your schedule please contact [facility] directly at [phone].  Normal or non-critical lab and imaging results will be communicated to you by myRetehart, letter or phone within 4 business days after the clinic has received the results. If you do not hear from us within 7 days, please contact the clinic through myRetehart or phone. If you have a critical or abnormal lab result, we will notify you by phone as soon as possible.  Submit refill requests through Manifact or call your pharmacy and they will forward the refill request to us. Please allow 3 business days for your refill to be completed.          Additional Information About Your Visit        myReteharACell Information     Manifact gives you secure access to your electronic health record. If you see a primary care provider, you can also send messages to your care team and make appointments. If you have questions, please call your primary care clinic.  If you do not have a primary care provider, please call [phone] and they will assist you.        Care EveryWhere ID     This is your Care EveryWhere ID. This could be used by other organizations to access your [facility] medical records  [id number]        Your Vitals Were     Pulse Temperature Height Pulse Oximetry Breastfeeding? BMI (Body Mass Index)    66 98  F (36.7  C) (Oral) 5' 8\" (1.727 m) 94% No 34.52 kg/m2       Blood Pressure from Last 3 Encounters:   01/09/18 132/80   07/07/17 137/86   01/04/17 151/85    Weight from Last 3 Encounters:   01/09/18 227 lb (103 kg)   07/07/17 227 lb (103 kg)   01/04/17 236 lb (107 kg)              We Performed the Following     CBC with platelets     Comprehensive metabolic panel     Lipid panel reflex to direct LDL Fasting          Today's Medication Changes          These changes are " accurate as of: 1/9/18 11:58 AM.  If you have any questions, ask your nurse or doctor.               These medicines have changed or have updated prescriptions.        Dose/Directions    levothyroxine 150 MCG tablet   Commonly known as:  SYNTHROID/LEVOTHROID   This may have changed:    - how much to take  - how to take this  - when to take this  - additional instructions   Changed by:  Fili Vasquez MD        Taking 1 tablet by mouth daily, except one daily each week will take 1.5 tablet daily.   Refills:  0            Where to get your medicines      These medications were sent to [facility] [address] [place] [address] [id number] [place] [address] AT [place] [address]  [address] [facility] [address]    Hours:  24-hours Phone:  [phone]     albuterol 108 (90 BASE) MCG/ACT Inhaler    budesonide 180 MCG/ACT inhaler    hydrochlorothiazide 25 MG tablet    losartan 100 MG tablet    SUMAtriptan 100 MG tablet                Primary Care Provider Office Phone # Fax #    Fili Vasqeuz [phone] [address]  [place] [address]        Equal Access to Services     [facility] Center[facility]RE : Hadii aad ku hadasho Soomaali, waaxda luqadaha, qaybta kaalmada adeegyada, waxay aurelioin haynallelyn raul sanderson. So [facility] [phone].    ATENCIÓN: Si habla español, tiene a xiong disposición servicios gratuitos de asistencia lingüística. Noemi al [phone].    We comply with applicable federal civil rights laws and [place] laws. We do not discriminate on the basis of race, color, national origin, age, disability, sex, sexual orientation, or gender identity.            Thank you!     Thank you for choosing [facility]  for your care. Our goal is always to provide you with excellent care. Hearing back from our patients is one way we can continue to improve our services. Please take a few minutes to complete the written survey that you may receive in the  mail after your visit with us. Thank you!             Your Updated Medication List - Protect others around you: Learn how to safely use, store and throw away your medicines at www.disposemymeds.org.          This list is accurate as of: 1/9/18 11:58 AM.  Always use your most recent med list.                   Brand Name Dispense Instructions for use Diagnosis    ADVIL 200 MG tablet   Generic drug:  ibuprofen      Take 200 mg by mouth every 4 hours as needed.        albuterol 108 (90 BASE) MCG/ACT Inhaler    PROAIR HFA/PROVENTIL HFA/VENTOLIN HFA    1 Inhaler    Inhale 2 puffs into the lungs every 6 hours as needed for shortness of breath / dyspnea or wheezing    Extrinsic asthma, mild persistent, with acute exacerbation       AMITIZA 24 MCG capsule   Generic drug:  lubiprostone     180 capsule    TAKE 1 CAPSULE BY MOUTH TWICE DAILY WITH MEALS    Irritable bowel syndrome, unspecified type       budesonide 180 MCG/ACT inhaler    PULMICORT FLEXHALER    1 Inhaler    Inhale 2 puffs into the lungs 2 times daily    Extrinsic asthma, mild persistent, with acute exacerbation       CALCIUM 500 + D 500-200 MG-IU Tabs      bid        cyclobenzaprine 5 MG tablet    FLEXERIL    42 tablet    TAKE 1 TABLET(5 MG) BY MOUTH THREE TIMES DAILY AS NEEDED FOR MUSCLE SPASMS    Cervicalgia, Arm pain, left       hydrochlorothiazide 25 MG tablet    HYDRODIURIL    30 tablet    Take 1 tablet (25 mg) by mouth daily    Benign essential hypertension       levothyroxine 150 MCG tablet    SYNTHROID/LEVOTHROID     Taking 1 tablet by mouth daily, except one daily each week will take 1.5 tablet daily.        losartan 100 MG tablet    COZAAR    30 tablet    TAKE 1 TABLET BY MOUTH DAILY. Patient due for labs (potassium and creatinine). Please call to schedule lab only appointment.    Essential hypertension       MULTIVITAMIN TABS   OR      daily        ondansetron 4 MG tablet    ZOFRAN    18 tablet    Take 1 tablet (4 mg) by mouth every 8 hours as needed  for nausea    Nausea       SUMAtriptan 100 MG tablet    IMITREX    9 tablet    TAKE 1 TABLET BY MOUTH AT ONSET OF HEADACHE AS DIRECTED - 9 tablets to last 23 days    Migraine without aura and without status migrainosus, not intractable

## 2018-01-09 NOTE — PROGRESS NOTES
"[facility]  CLINIC PROGRESS NOTE    Subjective:  Hypertension   Rhoda Ayala returns to the clinic today for blood pressure management.  She did see endocrinology and was noted to have good blood pressure reading at that time.  Her thyroid dose was increased 150 mcg daily plus an addition 75 mcg once per week.  Asthma   Doing well.   She did have a upper respiratory infection the week before [place], but otherwise things have been going well.     Past medical history, medications, allergies, social history, family history reviewed and updated in [facility] as of 1/9/2018 .    ROS  CONSTITUTIONAL: no fatigue, no unexpected change in weight  SKIN: no worrisome rashes, no worrisome moles, no worrisome lesions  EYES: no acute vision problems or changes  ENT: no ear problems, no mouth problems, no throat problems  RESP: as above   CV: no chest pain, no palpitations, no new or worsening peripheral edema  GI: no nausea, no vomiting, no constipation, no diarrhea  : no frequency, no dysuria, no hematuria  MS: no claudication, no myalgias, no joint aches  PSYCHIATRIC: no changes in mood or affect      Objective:  Vitals  /80  Pulse 66  Temp 98  F (36.7  C) (Oral)  Ht 5' 8\" (1.727 m)  Wt 227 lb (103 kg)  SpO2 94%  Breastfeeding? No  BMI 34.52 kg/m2  GEN: Alert Oriented x3 NAD  HEENT: Atraumatic, normocephalic, neck supple, no thyromegaly, negative cervical adenopathy  TM: TM bilaterally pearly and grey with normal light reflex  CV: RRR no murmurs or rubs  PULM: CTA no wheezes or crackles  ABD: Soft, nontender nondistended, no hepatosplenomegally  SKIN: No visible skin lesion or ulcerations  EXT:  no edema bilateral lower extremities  NEURO: Gait and station deferred, No focal neurologic deficits  PSYCH: Mood good, affect mood congruent    No images are attached to the encounter.    No results found for this or any previous visit (from the past 24 hour(s)).    Assessment/Plan:  Patient Instructions "   (Z13.6) CARDIOVASCULAR SCREENING; LDL GOAL LESS THAN 130  (primary encounter diagnosis)  Comment: We will check fasting lipid panel today and manage accordingly  Plan: Lipid panel reflex to direct LDL Fasting, CBC         with platelets            (J45.31) Extrinsic asthma, mild persistent, with acute exacerbation  Comment: Lungs were clear today - continue current inhalers  Plan: budesonide (PULMICORT FLEXHALER) 180 MCG/ACT         inhaler, albuterol (PROAIR HFA/PROVENTIL         HFA/VENTOLIN HFA) 108 (90 BASE) MCG/ACT Inhaler            (G43.009) Migraine without aura and without status migrainosus, not intractable  Comment: also stable - no change in dosing of immitrex   Plan: SUMAtriptan (IMITREX) 100 MG tablet            (I10) Benign essential hypertension  Comment: blood pressure is excellent - continue hydrochlorothiazide and losartan  Plan: hydrochlorothiazide (HYDRODIURIL) 25 MG tablet            (I10) Essential hypertension  Comment: as above   Plan: losartan (COZAAR) 100 MG tablet, Comprehensive         metabolic panel, CBC with platelets            (E03.9) Hypothyroidism, unspecified type  Comment: check TSH with endocrinology   Plan:     (J45.21) Mild intermittent extrinsic asthma with acute exacerbation  Comment: Stable - no acute issues  Plan:        Follow up in 6 months    Disclaimer: This note consists of symbols derived from keyboarding, dictation and/or voice recognition software. As a result, there may be errors in the script that have gone undetected. Please consider this when interpreting information found in this chart.    Fili Vasquez MD  [phone]

## 2018-01-09 NOTE — PATIENT INSTRUCTIONS
(Z13.6) CARDIOVASCULAR SCREENING; LDL GOAL LESS THAN 130  (primary encounter diagnosis)  Comment: We will check fasting lipid panel today and manage accordingly  Plan: Lipid panel reflex to direct LDL Fasting, CBC         with platelets            (J45.31) Extrinsic asthma, mild persistent, with acute exacerbation  Comment: Lungs were clear today - continue current inhalers  Plan: budesonide (PULMICORT FLEXHALER) 180 MCG/ACT         inhaler, albuterol (PROAIR HFA/PROVENTIL         HFA/VENTOLIN HFA) 108 (90 BASE) MCG/ACT Inhaler            (G43.009) Migraine without aura and without status migrainosus, not intractable  Comment: also stable - no change in dosing of immitrex   Plan: SUMAtriptan (IMITREX) 100 MG tablet            (I10) Benign essential hypertension  Comment: blood pressure is excellent - continue hydrochlorothiazide and losartan  Plan: hydrochlorothiazide (HYDRODIURIL) 25 MG tablet            (I10) Essential hypertension  Comment: as above   Plan: losartan (COZAAR) 100 MG tablet, Comprehensive         metabolic panel, CBC with platelets            (E03.9) Hypothyroidism, unspecified type  Comment: check TSH with endocrinology   Plan:     (J45.21) Mild intermittent extrinsic asthma with acute exacerbation  Comment: Stable - no acute issues  Plan:

## 2018-01-09 NOTE — NURSING NOTE
"Chief Complaint   Patient presents with     Recheck Medication     Hypertension     follow-up for this, not checking BP outside of clinic but a few weeks ago saw Marian and reports normal BP       Initial /86  Pulse 66  Temp 98  F (36.7  C) (Oral)  Ht 5' 8\" (1.727 m)  Wt 227 lb (103 kg)  SpO2 94%  Breastfeeding? No  BMI 34.52 kg/m2 Estimated body mass index is 34.52 kg/(m^2) as calculated from the following:    Height as of this encounter: 5' 8\" (1.727 m).    Weight as of this encounter: 227 lb (103 kg).  Medication Reconciliation: complete     Bladimir Deras CMA     "

## 2018-01-10 LAB
ALBUMIN SERPL-MCNC: 3.8 G/DL (ref 3.4–5)
ALP SERPL-CCNC: 90 U/L (ref 40–150)
ALT SERPL W P-5'-P-CCNC: 35 U/L (ref 0–50)
ANION GAP SERPL CALCULATED.3IONS-SCNC: 8 MMOL/L (ref 3–14)
AST SERPL W P-5'-P-CCNC: 23 U/L (ref 0–45)
BILIRUB SERPL-MCNC: 0.4 MG/DL (ref 0.2–1.3)
BUN SERPL-MCNC: 14 MG/DL (ref 7–30)
CALCIUM SERPL-MCNC: 9.2 MG/DL (ref 8.5–10.1)
CHLORIDE SERPL-SCNC: 105 MMOL/L (ref 94–109)
CHOLEST SERPL-MCNC: 190 MG/DL
CO2 SERPL-SCNC: 28 MMOL/L (ref 20–32)
CREAT SERPL-MCNC: 0.77 MG/DL (ref 0.52–1.04)
GFR SERPL CREATININE-BSD FRML MDRD: 77 ML/MIN/1.7M2
GLUCOSE SERPL-MCNC: 96 MG/DL (ref 70–99)
HDLC SERPL-MCNC: 33 MG/DL
LDLC SERPL CALC-MCNC: 100 MG/DL
NONHDLC SERPL-MCNC: 157 MG/DL
POTASSIUM SERPL-SCNC: 3.8 MMOL/L (ref 3.4–5.3)
PROT SERPL-MCNC: 7.2 G/DL (ref 6.8–8.8)
SODIUM SERPL-SCNC: 141 MMOL/L (ref 133–144)
TRIGL SERPL-MCNC: 284 MG/DL

## 2018-01-11 NOTE — PROGRESS NOTES
Onesimo Duong,    I had the opportunity to review your recent labs and a summary of your labs reads as follows:    Your complete blood counts show no sign of anemia, normal white blood cell count and platelets.  Your comprehensive metabolic panel showed normal renal function, normal liver function, and normal fasting blood glucose indicating no evidence of diabetes mellitus.  Your fasting lipid panel show  - low HDL (good) cholesterol -as your goal is greater than 40  - low LDL (bad) cholesterol as your goal is less than 130  - improved triglyceride levels    Congratulaions on your excellent results        Sincerely,  Fili Vasquez MD

## 2018-01-15 ENCOUNTER — TELEPHONE (OUTPATIENT)
Dept: FAMILY MEDICINE | Facility: CLINIC | Age: 58
End: 2018-01-15

## 2018-01-15 NOTE — TELEPHONE ENCOUNTER
ACT Asthma Control Test not done at recent appt per weekly quality report.  Mailed to pt.  Unable to do over the phone or via MY CHART due to copyright laws.  Christine Weller MA

## 2018-01-15 NOTE — LETTER
"[facility]  [address] [facility]  [address]  [place]  [address]  Tel: [phone]    January 15, 2018    Rhoda Ayala  [address] [facility] [address] [address] [facility] [address]        Dear Ms. Ayala,    We missed doing your Asthma Control Test when you were in for your recent appointment.     Please complete the enclosed \"ACT\"and either     mail back to us  or  fax [phone] back to us  or  call [phone] us with your answers (ask for any of the nurses/ med assist).     Due to copyright laws we are unable to ask you the questions over the phone without the form visible to you.   We are unable to do via PowerMessage also.        Sincerely,    Christine NYE MA on behalf of Fili Vasquez MD        Enc: ACT form      "

## 2018-01-19 ENCOUNTER — OFFICE VISIT (OUTPATIENT)
Dept: FAMILY MEDICINE | Facility: CLINIC | Age: 58
End: 2018-01-19
Payer: COMMERCIAL

## 2018-01-19 VITALS
DIASTOLIC BLOOD PRESSURE: 93 MMHG | BODY MASS INDEX: 35.01 KG/M2 | HEIGHT: 68 IN | OXYGEN SATURATION: 96 % | TEMPERATURE: 98 F | SYSTOLIC BLOOD PRESSURE: 166 MMHG | HEART RATE: 72 BPM | WEIGHT: 231 LBS

## 2018-01-19 DIAGNOSIS — J45.21 MILD INTERMITTENT EXTRINSIC ASTHMA WITH ACUTE EXACERBATION: ICD-10-CM

## 2018-01-19 DIAGNOSIS — J06.9 UPPER RESPIRATORY TRACT INFECTION, UNSPECIFIED TYPE: Primary | ICD-10-CM

## 2018-01-19 PROCEDURE — 99213 OFFICE O/P EST LOW 20 MIN: CPT | Performed by: INTERNAL MEDICINE

## 2018-01-19 RX ORDER — PREDNISONE 20 MG/1
40 TABLET ORAL DAILY
Qty: 5 TABLET | Refills: 0 | Status: SHIPPED | OUTPATIENT
Start: 2018-01-19 | End: 2018-01-24

## 2018-01-19 RX ORDER — DOXYCYCLINE 100 MG/1
100 CAPSULE ORAL 2 TIMES DAILY
Qty: 28 CAPSULE | Refills: 0 | Status: SHIPPED | OUTPATIENT
Start: 2018-01-19 | End: 2018-02-02

## 2018-01-19 RX ORDER — ALBUTEROL SULFATE 0.83 MG/ML
1 SOLUTION RESPIRATORY (INHALATION) EVERY 6 HOURS PRN
Qty: 25 VIAL | Refills: 11 | Status: SHIPPED | OUTPATIENT
Start: 2018-01-19 | End: 2020-11-10

## 2018-01-19 NOTE — NURSING NOTE
"Chief Complaint   Patient presents with     URI       Initial BP (!) 166/93 (BP Location: Left arm, Cuff Size: Adult Large)  Pulse 72  Temp 98  F (36.7  C) (Oral)  Ht 5' 8\" (1.727 m)  Wt 231 lb (104.8 kg)  SpO2 96%  BMI 35.12 kg/m2 Estimated body mass index is 35.12 kg/(m^2) as calculated from the following:    Height as of this encounter: 5' 8\" (1.727 m).    Weight as of this encounter: 231 lb (104.8 kg).  Medication Reconciliation: complete       Ignacia Fry CMA      "

## 2018-01-19 NOTE — PATIENT INSTRUCTIONS
(J06.9) Upper respiratory tract infection, unspecified type  (primary encounter diagnosis)  Comment: noted worsening asthma likely relate to upper respiratory infection - possible influenza versus bacterial source.  We will treat empiricaly with doxycycline 100 mg twice per day x 7 days and prednisone burst.   Recommend rest, use of neti-pot and mucinex over the counter. Follow up in 1 week if not improved.  Plan: doxycycline monohydrate 100 MG capsule,         predniSONE (DELTASONE) 20 MG tablet            (J45.21) Mild intermittent extrinsic asthma with acute exacerbation  Comment: as above - letter written stating medical illness causing absence from work.  Possible flare of asthma periodically during the year  Plan: albuterol (2.5 MG/3ML) 0.083% neb solution,         predniSONE (DELTASONE) 20 MG tablet

## 2018-01-19 NOTE — MR AVS SNAPSHOT
After Visit Summary   1/19/2018    Rhoda Ayala    MRN: [id number]           Patient Information     Date Of Birth          1960        Visit Information        Provider Department      1/19/2018 4:00 PM Fili Vasquez MD [facility]        Today's Diagnoses     Upper respiratory tract infection, unspecified type    -  1    Mild intermittent extrinsic asthma with acute exacerbation          Care Instructions    (J06.9) Upper respiratory tract infection, unspecified type  (primary encounter diagnosis)  Comment: noted worsening asthma likely relate to upper respiratory infection - possible influenza versus bacterial source.  We will treat empiricaly with doxycycline 100 mg twice per day x 7 days and prednisone burst.   Recommend rest, use of neti-pot and mucinex over the counter. Follow up in 1 week if not improved.  Plan: doxycycline monohydrate 100 MG capsule,         predniSONE (DELTASONE) 20 MG tablet            (J45.21) Mild intermittent extrinsic asthma with acute exacerbation  Comment: as above - letter written stating medical illness causing absence from work.  Possible flare of asthma periodically during the year  Plan: albuterol (2.5 MG/3ML) 0.083% neb solution,         predniSONE (DELTASONE) 20 MG tablet                     Follow-ups after your visit        Who to contact     If you have questions or need follow up information about today's clinic visit or your schedule please contact [facility] directly at [phone].  Normal or non-critical lab and imaging results will be communicated to you by MyChart, letter or phone within 4 business days after the clinic has received the results. If you do not hear from us within 7 days, please contact the clinic through MyChart or phone. If you have a critical or abnormal lab result, we will notify you by phone as soon as possible.  Submit refill requests through [employer]lin[employer] or call your pharmacy and they will  "forward the refill request to us. Please allow 3 business days for your refill to be completed.          Additional Information About Your Visit        [employer] Information     [employer] gives you secure access to your electronic health record. If you see a primary care provider, you can also send messages to your care team and make appointments. If you have questions, please call your primary care clinic.  If you do not have a primary care provider, please call [phone] and they will assist you.        Care EveryWhere ID     This is your Care EveryWhere ID. This could be used by other organizations to access your [facility] medical records  [id number]        Your Vitals Were     Pulse Temperature Height Pulse Oximetry BMI (Body Mass Index)       72 98  F (36.7  C) (Oral) 5' 8\" (1.727 m) 96% 35.12 kg/m2        Blood Pressure from Last 3 Encounters:   01/19/18 (!) 166/93   01/09/18 132/80   07/07/17 137/86    Weight from Last 3 Encounters:   01/19/18 231 lb (104.8 kg)   01/09/18 227 lb (103 kg)   07/07/17 227 lb (103 kg)              Today, you had the following     No orders found for display         Today's Medication Changes          These changes are accurate as of: 1/19/18  4:30 PM.  If you have any questions, ask your nurse or doctor.               Start taking these medicines.        Dose/Directions    doxycycline monohydrate 100 MG capsule   Used for:  Upper respiratory tract infection, unspecified type   Started by:  Fili Vasquez MD        Dose:  100 mg   Take 1 capsule (100 mg) by mouth 2 times daily for 14 days   Quantity:  28 capsule   Refills:  0       predniSONE 20 MG tablet   Commonly known as:  DELTASONE   Used for:  Upper respiratory tract infection, unspecified type, Mild intermittent extrinsic asthma with acute exacerbation   Started by:  Fili Vasquez MD        Dose:  40 mg   Take 2 tablets (40 mg) by mouth daily for 5 days   Quantity:  5 tablet   Refills:  0         These " medicines have changed or have updated prescriptions.        Dose/Directions    * albuterol 108 (90 BASE) MCG/ACT Inhaler   Commonly known as:  PROAIR HFA/PROVENTIL HFA/VENTOLIN HFA   This may have changed:  Another medication with the same name was added. Make sure you understand how and when to take each.   Used for:  Extrinsic asthma, mild persistent, with acute exacerbation   Changed by:  Fili Vasquez MD        Dose:  2 puff   Inhale 2 puffs into the lungs every 6 hours as needed for shortness of breath / dyspnea or wheezing   Quantity:  1 Inhaler   Refills:  11       * albuterol (2.5 MG/3ML) 0.083% neb solution   This may have changed:  You were already taking a medication with the same name, and this prescription was added. Make sure you understand how and when to take each.   Used for:  Mild intermittent extrinsic asthma with acute exacerbation   Changed by:  Fili Vasquez MD        Dose:  1 vial   Take 1 vial (2.5 mg) by nebulization every 6 hours as needed for shortness of breath / dyspnea or wheezing   Quantity:  25 vial   Refills:  11       * Notice:  This list has 2 medication(s) that are the same as other medications prescribed for you. Read the directions carefully, and ask your doctor or other care provider to review them with you.         Where to get your medicines      These medications were sent to [facility] [address] [place] [id number]    Hours:  24-hours Phone:  [phone]     albuterol (2.5 MG/3ML) 0.083% neb solution    doxycycline monohydrate 100 MG capsule    predniSONE 20 MG tablet                Primary Care Provider Office Phone # Fax #    Fili Vasquez [phone] [address]  [facility] [address]        Equal Access to Services     INGRID NEGRETE AH: Alexia Goodman, rich bronson, osbaldo ch,  reinaldo marie kadie salas'aan ah. So [facility] [phone].    ATENCIÓN: Si habla cynthia, tiene a xiong disposición servicios gratuitos de asistencia lingüística. Noemi panchal [phone].    We comply with applicable federal civil rights laws and [place] laws. We do not discriminate on the basis of race, color, national origin, age, disability, sex, sexual orientation, or gender identity.            Thank you!     Thank you for choosing [facility]  for your care. Our goal is always to provide you with excellent care. Hearing back from our patients is one way we can continue to improve our services. Please take a few minutes to complete the written survey that you may receive in the mail after your visit with us. Thank you!             Your Updated Medication List - Protect others around you: Learn how to safely use, store and throw away your medicines at www.disposemymeds.org.          This list is accurate as of: 1/19/18  4:30 PM.  Always use your most recent med list.                   Brand Name Dispense Instructions for use Diagnosis    ADVIL 200 MG tablet   Generic drug:  ibuprofen      Take 200 mg by mouth every 4 hours as needed.        * albuterol 108 (90 BASE) MCG/ACT Inhaler    PROAIR HFA/PROVENTIL HFA/VENTOLIN HFA    1 Inhaler    Inhale 2 puffs into the lungs every 6 hours as needed for shortness of breath / dyspnea or wheezing    Extrinsic asthma, mild persistent, with acute exacerbation       * albuterol (2.5 MG/3ML) 0.083% neb solution     25 vial    Take 1 vial (2.5 mg) by nebulization every 6 hours as needed for shortness of breath / dyspnea or wheezing    Mild intermittent extrinsic asthma with acute exacerbation       AMITIZA 24 MCG capsule   Generic drug:  lubiprostone     180 capsule    TAKE 1 CAPSULE BY MOUTH TWICE DAILY WITH MEALS    Irritable bowel syndrome, unspecified type       budesonide 180 MCG/ACT inhaler    PULMICORT FLEXHALER    1 Inhaler    Inhale 2 puffs into the lungs  2 times daily    Extrinsic asthma, mild persistent, with acute exacerbation       CALCIUM 500 + D 500-200 MG-IU Tabs      bid        cyclobenzaprine 5 MG tablet    FLEXERIL    42 tablet    TAKE 1 TABLET(5 MG) BY MOUTH THREE TIMES DAILY AS NEEDED FOR MUSCLE SPASMS    Cervicalgia, Arm pain, left       doxycycline monohydrate 100 MG capsule     28 capsule    Take 1 capsule (100 mg) by mouth 2 times daily for 14 days    Upper respiratory tract infection, unspecified type       hydrochlorothiazide 25 MG tablet    HYDRODIURIL    30 tablet    Take 1 tablet (25 mg) by mouth daily    Benign essential hypertension       levothyroxine 150 MCG tablet    SYNTHROID/LEVOTHROID     Taking 1 tablet by mouth daily, except one daily each week will take 1.5 tablet daily.        losartan 100 MG tablet    COZAAR    30 tablet    TAKE 1 TABLET BY MOUTH DAILY. Patient due for labs (potassium and creatinine). Please call to schedule lab only appointment.    Essential hypertension       MULTIVITAMIN TABS   OR      daily        ondansetron 4 MG tablet    ZOFRAN    18 tablet    Take 1 tablet (4 mg) by mouth every 8 hours as needed for nausea    Nausea       predniSONE 20 MG tablet    DELTASONE    5 tablet    Take 2 tablets (40 mg) by mouth daily for 5 days    Upper respiratory tract infection, unspecified type, Mild intermittent extrinsic asthma with acute exacerbation       SUMAtriptan 100 MG tablet    IMITREX    9 tablet    TAKE 1 TABLET BY MOUTH AT ONSET OF HEADACHE AS DIRECTED - 9 tablets to last 23 days    Migraine without aura and without status migrainosus, not intractable       * Notice:  This list has 2 medication(s) that are the same as other medications prescribed for you. Read the directions carefully, and ask your doctor or other care provider to review them with you.

## 2018-01-19 NOTE — PROGRESS NOTES
"    SUBJECTIVE:   Rhoda Ayala is a 57 year old female who presents to clinic today for the following health issues:      RESPIRATORY SYMPTOMS      Duration: 8 days    Description  nasal congestion, sore throat, facial pain/pressure, cough, wheezing, chills, headache, fatigue/malaise, hoarse voice, myalgias, conjunctival irritation and nausea    Severity: moderate    Accompanying signs and symptoms: None    History (predisposing factors):  asthma    Precipitating or alleviating factors: None    Therapies tried and outcome:  [facility]  CLINIC PROGRESS NOTE    Subjective:  Upper respiratory infection   Rhoda Ayala has been suffering with sinus pressure, shortness of breath, nasal drainage and wheezing.  She did take Sunday off and worked Wednesday through Thursday.   She has been exposed to flu recently.  She feels as though she is getting better, but still having post-nasal drip.      Past medical history, medications, allergies, social history, family history reviewed and updated in [facility] as of 1/19/2018 .    ROS  CONSTITUTIONAL: no fatigue, no unexpected change in weight  SKIN: no worrisome rashes, no worrisome moles, no worrisome lesions  EYES: no acute vision problems or changes  ENT: no ear problems, no mouth problems, no throat problems  RESP: as above   CV: no chest pain, no palpitations, no new or worsening peripheral edema  GI: no nausea, no vomiting, no constipation, no diarrhea  : no frequency, no dysuria, no hematuria  MS: no claudication, no myalgias, no joint aches  PSYCHIATRIC: no changes in mood or affect      Objective:  Vitals  BP (!) 166/93 (BP Location: Left arm, Cuff Size: Adult Large)  Pulse 72  Temp 98  F (36.7  C) (Oral)  Ht 5' 8\" (1.727 m)  Wt 231 lb (104.8 kg)  SpO2 96%  BMI 35.12 kg/m2  GEN: Alert Oriented x3 NAD  HEENT: Atraumatic, normocephalic, neck supple, no thyromegaly, negative cervical adenopathy  TM: TM bilaterally pearly and grey with normal " light reflex  CV: RRR no murmurs or rubs  PULM: Diffuse wheezing all lung fields  ABD: Soft, nontender nondistended, no hepatosplenomegally  SKIN: No visible skin lesion or ulcerations  EXT: 2+ dorsal pedis pulses, no edema bilateral lower extremities  NEURO: Gait and station deferred, No focal neurologic deficits  PSYCH: Mood good, affect mood congruent    No images are attached to the encounter.    No results found for this or any previous visit (from the past 24 hour(s)).    Assessment/Plan:  Patient Instructions   (J06.9) Upper respiratory tract infection, unspecified type  (primary encounter diagnosis)  Comment: noted worsening asthma likely relate to upper respiratory infection - possible influenza versus bacterial source.  We will treat empiricaly with doxycycline 100 mg twice per day x 7 days and prednisone burst.   Recommend rest, use of neti-pot and mucinex over the counter. Follow up in 1 week if not improved.  Plan: doxycycline monohydrate 100 MG capsule,         predniSONE (DELTASONE) 20 MG tablet            (J45.21) Mild intermittent extrinsic asthma with acute exacerbation  Comment: as above - letter written stating medical illness causing absence from work.  Possible flare of asthma periodically during the year  Plan: albuterol (2.5 MG/3ML) 0.083% neb solution,         predniSONE (DELTASONE) 20 MG tablet               Follow up in 1 week if not improved    Disclaimer: This note consists of symbols derived from keyboarding, dictation and/or voice recognition software. As a result, there may be errors in the script that have gone undetected. Please consider this when interpreting information found in this chart.    Fili Vasquez MD  [phone]

## 2018-01-19 NOTE — LETTER
[address] [address]       To whom it may concern,    Rhoda Ayala was seen in the clinic today 1/19/2018 for acute asthma exacerbation in the setting of an upper respiratory infection.  She did miss work on 01/14/18 for this illness.  I anticipate she will be able to return back to work on Friday 01/26/18.      Sincerely,  Fili Vasquez MD  1/19/2018

## 2018-01-23 ASSESSMENT — ASTHMA QUESTIONNAIRES: ACT_TOTALSCORE: 11

## 2018-01-30 ENCOUNTER — TELEPHONE (OUTPATIENT)
Dept: FAMILY MEDICINE | Facility: CLINIC | Age: 58
End: 2018-01-30

## 2018-01-30 NOTE — TELEPHONE ENCOUNTER
Reason for Call:  Form, our goal is to have forms completed with 72 hours, however, some forms may require a visit or additional information.    Type of letter, form or note:  Leave of Absence    Who is the form from?: Patient    Where did the form come from: form was faxed in    What clinic location was the form placed at?: [facility]    Where the form was placed: not sure    What number is listed as a contact on the form?: n/a       Additional comments: patient called and said the [employer] Forms that were Faxed was missing 3rd page please refax any questions please call patient at [phone]    Call taken on 1/30/2018 at 10:53 AM by Nancy Hsu

## 2018-01-30 NOTE — TELEPHONE ENCOUNTER
Do you recall these forms?  There are no copies of it in Media or in the accordion files.       Mona Romero MA

## 2018-01-31 ENCOUNTER — TELEPHONE (OUTPATIENT)
Dept: FAMILY MEDICINE | Facility: CLINIC | Age: 58
End: 2018-01-31

## 2018-01-31 NOTE — TELEPHONE ENCOUNTER
Spoke with Medical records to check if forms were received to be scanned. States scanning process is running about 1 week behind. Will continue to check Media.    If patient could fax back the KEREN forms that they do have along with a blank KEREN form, Dr. Vasquez may be able to complete again.  Left message for patient to return call to clinic to review the plan with her.    Tonya Pozo MA

## 2018-01-31 NOTE — TELEPHONE ENCOUNTER
Reason for Call:  Form, our goal is to have forms completed with 72 hours, however, some forms may require a visit or additional information.    Type of letter, form or note:  GORDY      What number is listed as a contact on the form?: [phone]       Additional comments: she said when this was faxed over the 3rd page was missing  Can you please re fax this   Thank you    Call taken on 1/31/2018 at 12:57 PM by Grady Frankel

## 2018-02-02 NOTE — TELEPHONE ENCOUNTER
The patient brought the forms in today and said that the 3rd page was faxed to [employer] but that we wanted a copy of the form and a blank one   Forms placed on Dr Vasquez's desk in the in Basket

## 2018-02-12 DIAGNOSIS — G43.009 MIGRAINE WITHOUT AURA AND WITHOUT STATUS MIGRAINOSUS, NOT INTRACTABLE: ICD-10-CM

## 2018-02-12 NOTE — TELEPHONE ENCOUNTER
"  SUMAtriptan (IMITREX) 100 MG tablet 9 tablet 15 1/9/2018       Last Written Prescription Date:  01/09/2018  Last Fill Quantity: 9,  # refills: 15   Last office visit: 1/19/2018 with prescribing provider:     Future Office Visit:  Unknown    Requested Prescriptions   Pending Prescriptions Disp Refills     SUMAtriptan (IMITREX) 100 MG tablet [Pharmacy Med Name: SUMATRIPTAN 100MG TABLETS] 9 tablet 0     Sig: TAKE 1 TABLET BY MOUTH AT ONSET OF HEADACHE AS DIRECTED    Serotonin Agonists Failed    2/12/2018  4:57 AM       Failed - Blood pressure under 140/90    BP Readings from Last 3 Encounters:   01/19/18 (!) 166/93   01/09/18 132/80   07/07/17 137/86                Failed - Serotonin Agonist request needs review.    Please review patient's record. If patient has had 8 or more treatments in the past month, please forward to provider.         Passed - Recent or future visit with authorizing provider's specialty    Patient had office visit in the last year or has a visit in the next 30 days with authorizing provider.  See \"Patient Info\" tab in inbasket, or \"Choose Columns\" in Meds & Orders section of the refill encounter.            Passed - Patient is age 18 or older       Passed - No active pregnancy on record       Passed - No positive pregnancy test in past 12 months          "

## 2018-02-14 RX ORDER — SUMATRIPTAN 100 MG/1
TABLET, FILM COATED ORAL
Start: 2018-02-14

## 2018-03-06 ENCOUNTER — TRANSFERRED RECORDS (OUTPATIENT)
Dept: HEALTH INFORMATION MANAGEMENT | Facility: CLINIC | Age: 58
End: 2018-03-06

## 2018-03-10 DIAGNOSIS — I10 ESSENTIAL HYPERTENSION: ICD-10-CM

## 2018-03-10 NOTE — TELEPHONE ENCOUNTER
"Last Written Prescription Date:  1/09/18  Last Fill Quantity: 30 tablet,  # refills: 11   Last office visit: 1/19/2018 with prescribing provider:  Christina   Future Office Visit:      Requested Prescriptions   Pending Prescriptions Disp Refills     losartan (COZAAR) 100 MG tablet [Pharmacy Med Name: LOSARTAN 100MG TABLETS] 90 tablet 0     Sig: TAKE 1 TABLET BY MOUTH EVERY DAY    Angiotensin-II Receptors Failed    3/10/2018  6:56 AM       Failed - Blood pressure under 140/90 in past 12 months    BP Readings from Last 3 Encounters:   01/19/18 (!) 166/93   01/09/18 132/80   07/07/17 137/86                Passed - Recent (12 mo) or future (30 days) visit within the authorizing provider's specialty    Patient had office visit in the last 12 months or has a visit in the next 30 days with authorizing provider or within the authorizing provider's specialty.  See \"Patient Info\" tab in inbasket, or \"Choose Columns\" in Meds & Orders section of the refill encounter.           Passed - Patient is age 18 or older       Passed - No active pregnancy on record       Passed - Normal serum creatinine on file in past 12 months    Recent Labs   Lab Test  01/09/18   1226   CR  0.77            Passed - Normal serum potassium on file in past 12 months    Recent Labs   Lab Test  01/09/18   1226   POTASSIUM  3.8                   Passed - No positive pregnancy test in past 12 months          "

## 2018-03-13 RX ORDER — LOSARTAN POTASSIUM 100 MG/1
TABLET ORAL
Qty: 90 TABLET | Refills: 2 | Status: SHIPPED | OUTPATIENT
Start: 2018-03-13 | End: 2018-12-21

## 2018-04-11 ENCOUNTER — TRANSFERRED RECORDS (OUTPATIENT)
Dept: HEALTH INFORMATION MANAGEMENT | Facility: CLINIC | Age: 58
End: 2018-04-11

## 2018-05-20 DIAGNOSIS — M54.2 CERVICALGIA: ICD-10-CM

## 2018-05-20 DIAGNOSIS — M79.602 ARM PAIN, LEFT: ICD-10-CM

## 2018-05-21 NOTE — TELEPHONE ENCOUNTER
cyclobenzaprine (FLEXERIL) 5 MG tablet  Last Written Prescription Date:  1/3/18  Last Fill Quantity: 42,  # refills: 0   Last office visit: 1/19/2018 with prescribing provider:  Christina   Future Office Visit:        Routing refill request to provider for review/approval because:  Drug not on the [facility], [facility] or [facility] refill protocol or controlled substance

## 2018-05-21 NOTE — TELEPHONE ENCOUNTER
Routing refill request to provider for review/approval because:  Drug not on the [facility] refill protocol   Marcia CHOPRA RN

## 2018-05-22 RX ORDER — CYCLOBENZAPRINE HCL 5 MG
TABLET ORAL
Qty: 42 TABLET | Refills: 0 | Status: SHIPPED | OUTPATIENT
Start: 2018-05-22 | End: 2019-12-18

## 2018-09-27 ENCOUNTER — TRANSFERRED RECORDS (OUTPATIENT)
Dept: HEALTH INFORMATION MANAGEMENT | Facility: CLINIC | Age: 58
End: 2018-09-27

## 2018-10-03 ENCOUNTER — TRANSFERRED RECORDS (OUTPATIENT)
Dept: HEALTH INFORMATION MANAGEMENT | Facility: CLINIC | Age: 58
End: 2018-10-03

## 2018-12-05 ENCOUNTER — OFFICE VISIT (OUTPATIENT)
Dept: FAMILY MEDICINE | Facility: CLINIC | Age: 58
End: 2018-12-05
Payer: COMMERCIAL

## 2018-12-05 VITALS
DIASTOLIC BLOOD PRESSURE: 98 MMHG | SYSTOLIC BLOOD PRESSURE: 174 MMHG | BODY MASS INDEX: 35.01 KG/M2 | WEIGHT: 231 LBS | HEART RATE: 72 BPM | HEIGHT: 68 IN | OXYGEN SATURATION: 96 %

## 2018-12-05 DIAGNOSIS — I10 ESSENTIAL HYPERTENSION: ICD-10-CM

## 2018-12-05 DIAGNOSIS — J06.9 UPPER RESPIRATORY TRACT INFECTION, UNSPECIFIED TYPE: Primary | ICD-10-CM

## 2018-12-05 PROBLEM — E66.01 MORBID OBESITY (H): Status: ACTIVE | Noted: 2018-12-05

## 2018-12-05 PROCEDURE — 99213 OFFICE O/P EST LOW 20 MIN: CPT | Performed by: INTERNAL MEDICINE

## 2018-12-05 RX ORDER — AZITHROMYCIN 250 MG/1
TABLET, FILM COATED ORAL
Qty: 6 TABLET | Refills: 0 | Status: SHIPPED | OUTPATIENT
Start: 2018-12-05 | End: 2018-12-21

## 2018-12-05 NOTE — MR AVS SNAPSHOT
After Visit Summary   12/5/2018    Rhoda Ayala    MRN: [id number]           Patient Information     Date Of Birth          1960        Visit Information        Provider Department      12/5/2018 12:00 PM Fili Vasquez MD [facility] [place]        Today's Diagnoses     Upper respiratory tract infection, unspecified type    -  1    Essential hypertension          Care Instructions    (J06.9) Upper respiratory tract infection, unspecified type  (primary encounter diagnosis)  Comment: We will treat empirically with azithromycin.  I would recommend nasal saline rinse or neti-pot.  For decongestant we can use over the counter mucinex (gauifenasin) and over the counter claritin (loratadine)   Plan: azithromycin (ZITHROMAX) 250 MG tablet            (I10) Essential hypertension  Comment: blood pressure is ellevated but likely due to recent thyroid dose change and addition of phenylephrine.  Hold all products with phenylephrine or sudafed and we will follow up in 2 weeks for recheck and physical  Plan:              Follow-ups after your visit        Follow-up notes from your care team     Return in about 2 weeks (around 12/19/2018) for Physical Exam.      Who to contact     If you have questions or need follow up information about today's clinic visit or your schedule please contact [facility] directly at [phone].  Normal or non-critical lab and imaging results will be communicated to you by MyChart, letter or phone within 4 business days after the clinic has received the results. If you do not hear from us within 7 days, please contact the clinic through MyChart or phone. If you have a critical or abnormal lab result, we will notify you by phone as soon as possible.  Submit refill requests through [employer] or call your pharmacy and they will forward the refill request to us. Please allow 3 business days for your refill to be completed.          Additional Information About  "Your Visit        [employer] Information     [employer] gives you secure access to your electronic health record. If you see a primary care provider, you can also send messages to your care team and make appointments. If you have questions, please call your primary care clinic.  If you do not have a primary care provider, please call [phone] and they will assist you.        Care EveryWhere ID     This is your Care EveryWhere ID. This could be used by other organizations to access your [place] medical records  [id number]        Your Vitals Were     Pulse Height Pulse Oximetry Breastfeeding? BMI (Body Mass Index)       72 5' 8\" (1.727 m) 96% No 35.12 kg/m2        Blood Pressure from Last 3 Encounters:   12/05/18 (!) 174/98   01/19/18 (!) 166/93   01/09/18 132/80    Weight from Last 3 Encounters:   12/05/18 231 lb (104.8 kg)   01/19/18 231 lb (104.8 kg)   01/09/18 227 lb (103 kg)              Today, you had the following     No orders found for display         Today's Medication Changes          These changes are accurate as of 12/5/18 12:28 PM.  If you have any questions, ask your nurse or doctor.               Start taking these medicines.        Dose/Directions    azithromycin 250 MG tablet   Commonly known as:  ZITHROMAX   Used for:  Upper respiratory tract infection, unspecified type   Started by:  Fili Vasquez MD        Two tablets first day, then one tablet daily for four days.   Quantity:  6 tablet   Refills:  0         These medicines have changed or have updated prescriptions.        Dose/Directions    LEVOTHROID PO   This may have changed:  Another medication with the same name was removed. Continue taking this medication, and follow the directions you see here.   Changed by:  Fili Vasquez MD        Dose:  175 mcg   Take 175 mcg by mouth   Refills:  0            Where to get your medicines      These medications were sent to [facility] [address] [place] [id number] [address] AT [place] [address]  [address] [address], [facility] [address]     Phone:  [phone]     azithromycin 250 MG tablet                Primary Care Provider Office Phone # Fax #    Fili Vasquez [phone] [address] [facility] [address]  [place] [address]        Equal Access to Services     [facility]: Hadii aad ku hadasho Soomaali, waaxda luqadaha, qaybta kaalmada adeegyada, waxay idiin hayaan adeeg kharash la'aan ah. So [facility] [phone].    ATENCIÓN: Si habla espdat, tiene a xiong disposición servicios gratuitos de asistencia lingüística. Noemi al [phone].    We comply with applicable federal civil rights laws and [place] laws. We do not discriminate on the basis of race, color, national origin, age, disability, sex, sexual orientation, or gender identity.            Thank you!     Thank you for choosing [facility]  for your care. Our goal is always to provide you with excellent care. Hearing back from our patients is one way we can continue to improve our services. Please take a few minutes to complete the written survey that you may receive in the mail after your visit with us. Thank you!             Your Updated Medication List - Protect others around you: Learn how to safely use, store and throw away your medicines at www.disposemymeds.org.          This list is accurate as of 12/5/18 12:28 PM.  Always use your most recent med list.                   Brand Name Dispense Instructions for use Diagnosis    ADVIL 200 MG tablet   Generic drug:  ibuprofen      Take 200 mg by mouth every 4 hours as needed.        * albuterol 108 (90 Base) MCG/ACT inhaler    PROAIR HFA/PROVENTIL HFA/VENTOLIN HFA    1 Inhaler    Inhale 2 puffs into the lungs every 6 hours as needed for shortness of breath / dyspnea or wheezing    Extrinsic asthma, mild persistent, with acute exacerbation       * albuterol (2.5 MG/3ML) 0.083% neb solution    PROVENTIL    25 vial     Take 1 vial (2.5 mg) by nebulization every 6 hours as needed for shortness of breath / dyspnea or wheezing    Mild intermittent extrinsic asthma with acute exacerbation       AMITIZA 24 MCG capsule   Generic drug:  lubiprostone     180 capsule    TAKE 1 CAPSULE BY MOUTH TWICE DAILY WITH MEALS    Irritable bowel syndrome, unspecified type       azithromycin 250 MG tablet    ZITHROMAX    6 tablet    Two tablets first day, then one tablet daily for four days.    Upper respiratory tract infection, unspecified type       budesonide 180 MCG/ACT inhaler    PULMICORT FLEXHALER    1 Inhaler    Inhale 2 puffs into the lungs 2 times daily    Extrinsic asthma, mild persistent, with acute exacerbation       CALCIUM 500 + D 500-200 MG-IU Tabs      bid        cyclobenzaprine 5 MG tablet    FLEXERIL    42 tablet    TAKE 1 TABLET(5 MG) BY MOUTH THREE TIMES DAILY AS NEEDED FOR MUSCLE SPASMS    Cervicalgia, Arm pain, left       hydrochlorothiazide 25 MG tablet    HYDRODIURIL    30 tablet    Take 1 tablet (25 mg) by mouth daily    Benign essential hypertension       LEVOTHROID PO      Take 175 mcg by mouth        * losartan 100 MG tablet    COZAAR    30 tablet    TAKE 1 TABLET BY MOUTH DAILY. Patient due for labs (potassium and creatinine). Please call to schedule lab only appointment.    Essential hypertension       * losartan 100 MG tablet    COZAAR    90 tablet    TAKE 1 TABLET BY MOUTH EVERY DAY    Essential hypertension       MULTIVITAMIN TABS   OR      daily        ondansetron 4 MG tablet    ZOFRAN    18 tablet    Take 1 tablet (4 mg) by mouth every 8 hours as needed for nausea    Nausea       SUMAtriptan 100 MG tablet    IMITREX    9 tablet    TAKE 1 TABLET BY MOUTH AT ONSET OF HEADACHE AS DIRECTED - 9 tablets to last 23 days    Migraine without aura and without status migrainosus, not intractable       * Notice:  This list has 4 medication(s) that are the same as other medications prescribed for you. Read the directions  carefully, and ask your doctor or other care provider to review them with you.

## 2018-12-05 NOTE — PROGRESS NOTES
"  SUBJECTIVE:   Rhoda Ayala is a 58 year old female who presents to clinic today for the following health issues:      Right ear pain for 3 to days  URi and cough    [facility]  CLINIC PROGRESS NOTE    Subjective:  Upper respiratory infection    Rhoda Ayala recently was babysitting her grand-daughter and about one week later she developed symptoms of sore throat, post-nasal drainage, wheezing and now ear pain.   Pain is in the right ear.  Pain started about 3-4 days ago.  No fevers, but feels achy.  She is taking phenylephrine.       Past medical history, medications, allergies, social history, family history reviewed and updated in [facility] as of 12/5/2018 .    ROS  CONSTITUTIONAL: no fatigue, no unexpected change in weight  SKIN: no worrisome rashes, no worrisome moles, no worrisome lesions  EYES: no acute vision problems or changes  ENT: as above   RESP: slight cough, no shortness of breath  CV: no chest pain, no palpitations, no new or worsening peripheral edema  GI: no nausea, no vomiting, no constipation, no diarrhea  : no frequency, no dysuria, no hematuria  MS: diffuse body aches  PSYCHIATRIC: no changes in mood or affect      Objective:  Vitals  BP (!) 174/98 (BP Location: Left arm, Patient Position: Chair, Cuff Size: Adult Large)  Pulse 72  Ht 5' 8\" (1.727 m)  Wt 231 lb (104.8 kg)  SpO2 96%  Breastfeeding? No  BMI 35.12 kg/m2  GEN: Alert Oriented x3 NAD  HEENT: Atraumatic, normocephalic, neck supple, no thyromegaly, negative cervical adenopathy  TM: TM bilaterally pearly and grey with normal light reflex  CV: RRR no murmurs or rubs  PULM: CTA no wheezes or crackles  ABD: Soft, nontender nondistended, no hepatosplenomegally  SKIN: No visible skin lesion or ulcerations  EXT: trace edema bilateral lower extremities  NEURO: Gait and station normal , No focal neurologic deficits  PSYCH: Mood good, affect mood congruent    No images are attached to the encounter.    No results found for this or " any previous visit (from the past 24 hour(s)).    Assessment/Plan:  Patient Instructions   (J06.9) Upper respiratory tract infection, unspecified type  (primary encounter diagnosis)  Comment: We will treat empirically with azithromycin.  I would recommend nasal saline rinse or neti-pot.  For decongestant we can use over the counter mucinex (gauifenasin) and over the counter claritin (loratadine)   Plan: azithromycin (ZITHROMAX) 250 MG tablet            (I10) Essential hypertension  Comment: blood pressure is ellevated but likely due to recent thyroid dose change and addition of phenylephrine.  Hold all products with phenylephrine or sudafed and we will follow up in 2 weeks for recheck and physical  Plan:        Follow up in 2 weeks    Disclaimer: This note consists of symbols derived from keyboarding, dictation and/or voice recognition software. As a result, there may be errors in the script that have gone undetected. Please consider this when interpreting information found in this chart.    Fili Vasquez MD  [phone]

## 2018-12-05 NOTE — PATIENT INSTRUCTIONS
(J06.9) Upper respiratory tract infection, unspecified type  (primary encounter diagnosis)  Comment: We will treat empirically with azithromycin.  I would recommend nasal saline rinse or neti-pot.  For decongestant we can use over the counter mucinex (gauifenasin) and over the counter claritin (loratadine)   Plan: azithromycin (ZITHROMAX) 250 MG tablet            (I10) Essential hypertension  Comment: blood pressure is ellevated but likely due to recent thyroid dose change and addition of phenylephrine.  Hold all products with phenylephrine or sudafed and we will follow up in 2 weeks for recheck and physical  Plan:

## 2018-12-07 ASSESSMENT — ASTHMA QUESTIONNAIRES: ACT_TOTALSCORE: 17

## 2018-12-21 ENCOUNTER — OFFICE VISIT (OUTPATIENT)
Dept: FAMILY MEDICINE | Facility: CLINIC | Age: 58
End: 2018-12-21
Payer: COMMERCIAL

## 2018-12-21 VITALS
HEART RATE: 72 BPM | WEIGHT: 234.3 LBS | HEIGHT: 68 IN | SYSTOLIC BLOOD PRESSURE: 146 MMHG | BODY MASS INDEX: 35.51 KG/M2 | TEMPERATURE: 98.1 F | OXYGEN SATURATION: 94 % | DIASTOLIC BLOOD PRESSURE: 94 MMHG

## 2018-12-21 DIAGNOSIS — E66.01 MORBID OBESITY (H): ICD-10-CM

## 2018-12-21 DIAGNOSIS — Z11.4 SCREENING FOR HIV (HUMAN IMMUNODEFICIENCY VIRUS): ICD-10-CM

## 2018-12-21 DIAGNOSIS — Z23 NEED FOR PROPHYLACTIC VACCINATION AND INOCULATION AGAINST INFLUENZA: ICD-10-CM

## 2018-12-21 DIAGNOSIS — I10 ESSENTIAL HYPERTENSION: ICD-10-CM

## 2018-12-21 DIAGNOSIS — Z12.4 SCREENING FOR MALIGNANT NEOPLASM OF CERVIX: ICD-10-CM

## 2018-12-21 DIAGNOSIS — I10 BENIGN ESSENTIAL HYPERTENSION: ICD-10-CM

## 2018-12-21 DIAGNOSIS — Z00.00 ROUTINE GENERAL MEDICAL EXAMINATION AT A HEALTH CARE FACILITY: Primary | ICD-10-CM

## 2018-12-21 DIAGNOSIS — Z11.59 NEED FOR HEPATITIS C SCREENING TEST: ICD-10-CM

## 2018-12-21 LAB
ALBUMIN SERPL-MCNC: 4 G/DL (ref 3.4–5)
ALP SERPL-CCNC: 106 U/L (ref 40–150)
ALT SERPL W P-5'-P-CCNC: 43 U/L (ref 0–50)
ANION GAP SERPL CALCULATED.3IONS-SCNC: 9 MMOL/L (ref 3–14)
AST SERPL W P-5'-P-CCNC: 18 U/L (ref 0–45)
BILIRUB SERPL-MCNC: 0.3 MG/DL (ref 0.2–1.3)
BUN SERPL-MCNC: 14 MG/DL (ref 7–30)
CALCIUM SERPL-MCNC: 9.9 MG/DL (ref 8.5–10.1)
CHLORIDE SERPL-SCNC: 104 MMOL/L (ref 94–109)
CHOLEST SERPL-MCNC: 219 MG/DL
CO2 SERPL-SCNC: 26 MMOL/L (ref 20–32)
CREAT SERPL-MCNC: 0.81 MG/DL (ref 0.52–1.04)
ERYTHROCYTE [DISTWIDTH] IN BLOOD BY AUTOMATED COUNT: 13 % (ref 10–15)
GFR SERPL CREATININE-BSD FRML MDRD: 79 ML/MIN/{1.73_M2}
GLUCOSE SERPL-MCNC: 111 MG/DL (ref 70–99)
HBA1C MFR BLD: 5.7 % (ref 0–5.6)
HCT VFR BLD AUTO: 41.9 % (ref 35–47)
HDLC SERPL-MCNC: 30 MG/DL
HGB BLD-MCNC: 13.7 G/DL (ref 11.7–15.7)
LDLC SERPL CALC-MCNC: ABNORMAL MG/DL
LDLC SERPL DIRECT ASSAY-MCNC: 107 MG/DL
MCH RBC QN AUTO: 29.6 PG (ref 26.5–33)
MCHC RBC AUTO-ENTMCNC: 32.7 G/DL (ref 31.5–36.5)
MCV RBC AUTO: 91 FL (ref 78–100)
NONHDLC SERPL-MCNC: 189 MG/DL
PLATELET # BLD AUTO: 328 10E9/L (ref 150–450)
POTASSIUM SERPL-SCNC: 3.8 MMOL/L (ref 3.4–5.3)
PROT SERPL-MCNC: 7.7 G/DL (ref 6.8–8.8)
RBC # BLD AUTO: 4.63 10E12/L (ref 3.8–5.2)
SODIUM SERPL-SCNC: 139 MMOL/L (ref 133–144)
TRIGL SERPL-MCNC: 488 MG/DL
WBC # BLD AUTO: 8.8 10E9/L (ref 4–11)

## 2018-12-21 PROCEDURE — 83721 ASSAY OF BLOOD LIPOPROTEIN: CPT | Mod: 59 | Performed by: INTERNAL MEDICINE

## 2018-12-21 PROCEDURE — 85027 COMPLETE CBC AUTOMATED: CPT | Performed by: INTERNAL MEDICINE

## 2018-12-21 PROCEDURE — 86803 HEPATITIS C AB TEST: CPT | Performed by: INTERNAL MEDICINE

## 2018-12-21 PROCEDURE — 99396 PREV VISIT EST AGE 40-64: CPT | Performed by: INTERNAL MEDICINE

## 2018-12-21 PROCEDURE — 83036 HEMOGLOBIN GLYCOSYLATED A1C: CPT | Performed by: INTERNAL MEDICINE

## 2018-12-21 PROCEDURE — 87624 HPV HI-RISK TYP POOLED RSLT: CPT | Performed by: INTERNAL MEDICINE

## 2018-12-21 PROCEDURE — 87389 HIV-1 AG W/HIV-1&-2 AB AG IA: CPT | Performed by: INTERNAL MEDICINE

## 2018-12-21 PROCEDURE — 80053 COMPREHEN METABOLIC PANEL: CPT | Performed by: INTERNAL MEDICINE

## 2018-12-21 PROCEDURE — 80061 LIPID PANEL: CPT | Performed by: INTERNAL MEDICINE

## 2018-12-21 PROCEDURE — G0145 SCR C/V CYTO,THINLAYER,RESCR: HCPCS | Performed by: INTERNAL MEDICINE

## 2018-12-21 PROCEDURE — 36415 COLL VENOUS BLD VENIPUNCTURE: CPT | Performed by: INTERNAL MEDICINE

## 2018-12-21 PROCEDURE — 99213 OFFICE O/P EST LOW 20 MIN: CPT | Mod: 25 | Performed by: INTERNAL MEDICINE

## 2018-12-21 RX ORDER — AMLODIPINE BESYLATE 5 MG/1
5 TABLET ORAL DAILY
Qty: 30 TABLET | Refills: 11 | Status: SHIPPED | OUTPATIENT
Start: 2018-12-21 | End: 2019-12-06

## 2018-12-21 RX ORDER — LOSARTAN POTASSIUM 100 MG/1
100 TABLET ORAL DAILY
Qty: 30 TABLET | Refills: 11 | Status: SHIPPED | OUTPATIENT
Start: 2018-12-21 | End: 2019-12-08

## 2018-12-21 RX ORDER — HYDROCHLOROTHIAZIDE 25 MG/1
25 TABLET ORAL DAILY
Qty: 30 TABLET | Refills: 11 | Status: SHIPPED | OUTPATIENT
Start: 2018-12-21 | End: 2019-12-06

## 2018-12-21 ASSESSMENT — MIFFLIN-ST. JEOR: SCORE: 1681.28

## 2018-12-21 NOTE — PATIENT INSTRUCTIONS
Preventive Health Recommendations  Female Ages 50 - 64    Yearly exam: See your health care provider every year in order to  o Review health changes.   o Discuss preventive care.    o Review your medicines if your doctor has prescribed any.      Get a Pap test every three years (unless you have an abnormal result and your provider advises testing more often).    If you get Pap tests with HPV test, you only need to test every 5 years, unless you have an abnormal result.     You do not need a Pap test if your uterus was removed (hysterectomy) and you have not had cancer.    You should be tested each year for STDs (sexually transmitted diseases) if you're at risk.     Have a mammogram every 1 to 2 years.    Have a colonoscopy at age 50, or have a yearly FIT test (stool test). These exams screen for colon cancer.      Have a cholesterol test every 5 years, or more often if advised.    Have a diabetes test (fasting glucose) every three years. If you are at risk for diabetes, you should have this test more often.     If you are at risk for osteoporosis (brittle bone disease), think about having a bone density scan (DEXA).    Shots: Get a flu shot each year. Get a tetanus shot every 10 years.    Nutrition:     Eat at least 5 servings of fruits and vegetables each day.    Eat whole-grain bread, whole-wheat pasta and brown rice instead of white grains and rice.    Get adequate Calcium and Vitamin D.     Lifestyle    Exercise at least 150 minutes a week (30 minutes a day, 5 days a week). This will help you control your weight and prevent disease.    Limit alcohol to one drink per day.    No smoking.     Wear sunscreen to prevent skin cancer.     See your dentist every six months for an exam and cleaning.    See your eye doctor every 1 to 2 years.      (Z00.00) Routine general medical examination at a health care facility  (primary encounter diagnosis)  Comment: For routine exam, we will draw labs as ordered, cholesterol,  diabetes mellitus check, liver function, renal function,  And request Pap smeawr.   We will also update vaccination history. Shingrix vaccine is now available.  I would call your insurance to see if a shingles vaccine is covered and get this at your pharmacy      Plan: CBC with platelets, Lipid panel reflex to         direct LDL Fasting, Comprehensive metabolic         panel, Hemoglobin A1c, NUTRITION REFERRAL            (Z12.4) Screening for malignant neoplasm of cervix  Comment:   Plan: Pap imaged thin layer screen with HPV -         recommended age 30 - 65 years (select HPV order        below)        \    (Z11.59) Need for hepatitis C screening test  Comment:  Plan: Hepatitis C Screen Reflex to HCV RNA Quant and         Genotype            (Z11.4) Screening for HIV (human immunodeficiency virus)  Comment:     Plan: HIV Screening            (Z23) Need for prophylactic vaccination and inoculation against influenza  Comment: given previously  Plan:     (I10) Benign essential hypertension  Comment: blood pressure is a bit elevated and we will add a new blood pressure medication of amlodipine and follow up in 6 weeks.  ALso recommend concerted effort to improve diet and nutrition - nutrition referral placed  Plan: hydrochlorothiazide (HYDRODIURIL) 25 MG tablet,        losartan (COZAAR) 100 MG tablet, amLODIPine         (NORVASC) 5 MG tablet            (I10) Essential hypertension  Comment: as above   Plan:

## 2018-12-21 NOTE — PROGRESS NOTES
SUBJECTIVE:   CC: Rhoda Ayala is an 58 year old woman who presents for preventive health visit.     Healthy Habits:    Do you get at least three servings of calcium containing foods daily (dairy, green leafy vegetables, etc.)? yes    Amount of exercise or daily activities, outside of work: 2 day(s) per week    Problems taking medications regularly No    Medication side effects: No    Have you had an eye exam in the past two years? yes    Do you see a dentist twice per year? yes    Do you have sleep apnea, excessive snoring or daytime drowsiness?no          Today's PHQ-2 Score:   PHQ-2 (  [employer]) 2018   Q1: Little interest or pleasure in doing things 0 0   Q2: Feeling down, depressed or hopeless 0 0   PHQ-2 Score 0 0       Abuse: Current or Past(Physical, Sexual or Emotional)- No  Do you feel safe in your environment? Yes    Social History     Tobacco Use     Smoking status: Light Tobacco Smoker     Smokeless tobacco: Never Used   Substance Use Topics     Alcohol use: Yes     Alcohol/week: 0.0 oz     Comment: rare use     If you drink alcohol do you typically have >3 drinks per day or >7 drinks per week? No                     Reviewed orders with patient.  Reviewed health maintenance and updated orders accordingly - Yes  Labs reviewed in EPIC    Mammogram up to date for this patient based on age.    Pertinent mammograms are reviewed under the imaging tab.  History of abnormal Pap smear: NO - age 30-65 PAP every 5 years with negative HPV co-testing recommended  PAP / HPV 2014   PAP NIL     Reviewed and updated as needed this visit by clinical staff         Reviewed and updated as needed this visit by Provider        Past Medical History:   Diagnosis Date     Asthma      Cellulitis     s/p I&D of wound     Hypertension      Hypothyroidism      Lower extremity edema      Melanoma (H) 2015     S/P appendectomy      S/P arthroscopic knee surgery      S/P  section      S/P lateral  "meniscectomy of right knee      S/P JOSEP (total abdominal hysterectomy)      Seasonal allergies         ROS:  CONSTITUTIONAL: NEGATIVE for fever, chills, change in weight  INTEGUMENTARU/SKIN: NEGATIVE for worrisome rashes, moles or lesions  EYES: NEGATIVE for vision changes or irritation  ENT: NEGATIVE for ear, mouth and throat problems  RESP: NEGATIVE for significant cough or SOB  BREAST: NEGATIVE for masses, tenderness or discharge  CV: NEGATIVE for chest pain, palpitations or peripheral edema  GI: NEGATIVE for nausea, abdominal pain, heartburn, or change in bowel habits  : NEGATIVE for unusual urinary or vaginal symptoms. Periods are regular.  MUSCULOSKELETAL: NEGATIVE for significant arthralgias or myalgia  NEURO: NEGATIVE for weakness, dizziness or paresthesias  PSYCHIATRIC: NEGATIVE for changes in mood or affect    OBJECTIVE:   BP (!) 146/94 (BP Location: Left arm, Cuff Size: Adult Large)   Pulse 72   Temp 98.1  F (36.7  C) (Oral)   Ht 1.727 m (5' 8\")   Wt 106.3 kg (234 lb 4.8 oz)   SpO2 94%   BMI 35.63 kg/m    EXAM:  GENERAL: healthy, alert and no distress  EYES: Eyes grossly normal to inspection, PERRL and conjunctivae and sclerae normal  HENT: ear canals and TM's normal, nose and mouth without ulcers or lesions  NECK: no adenopathy, no asymmetry, masses, or scars and thyroid normal to palpation  RESP: lungs clear to auscultation - no rales, rhonchi or wheezes  BREAST: deferred  CV: regular rate and rhythm, normal S1 S2, no S3 or S4, no murmur, click or rub, no peripheral edema and peripheral pulses strong  ; cervix appears normal without discharge  ABDOMEN: soft, nontender, no hepatosplenomegaly, no masses and bowel sounds normal  MS: no gross musculoskeletal defects noted, no edema  SKIN: no suspicious lesions or rashes  NEURO: Normal strength and tone, mentation intact and speech normal  PSYCH: mentation appears normal, affect normal/bright    Diagnostic Test Results:  none     ASSESSMENT/PLAN: " "      (Z00.00) Routine general medical examination at a health care facility  (primary encounter diagnosis)  Comment: For routine exam, we will draw labs as ordered, cholesterol, diabetes mellitus check, liver function, renal function,  And request Pap smeawr.   We will also update vaccination history. Shingrix vaccine is now available.  I would call your insurance to see if a shingles vaccine is covered and get this at your pharmacy      Plan: CBC with platelets, Lipid panel reflex to         direct LDL Fasting, Comprehensive metabolic         panel, Hemoglobin A1c, NUTRITION REFERRAL            (Z12.4) Screening for malignant neoplasm of cervix  Comment:   Plan: Pap imaged thin layer screen with HPV -         recommended age 30 - 65 years (select HPV order        below)        \    (Z11.59) Need for hepatitis C screening test  Comment:  Plan: Hepatitis C Screen Reflex to HCV RNA Quant and         Genotype            (Z11.4) Screening for HIV (human immunodeficiency virus)  Comment:     Plan: HIV Screening            (Z23) Need for prophylactic vaccination and inoculation against influenza  Comment: given previously  Plan:     (I10) Benign essential hypertension  Comment: blood pressure is a bit elevated and we will add a new blood pressure medication of amlodipine and follow up in 6 weeks.  ALso recommend concerted effort to improve diet and nutrition - nutrition referral placed  Plan: hydrochlorothiazide (HYDRODIURIL) 25 MG tablet,        losartan (COZAAR) 100 MG tablet, amLODIPine         (NORVASC) 5 MG tablet            (I10) Essential hypertension  Comment: as above   Plan:      Obesity  Comment.  Referral to nutrition given today      COUNSELING:   Reviewed preventive health counseling, as reflected in patient instructions    BP Readings from Last 1 Encounters:   12/05/18 (!) 174/98     Estimated body mass index is 35.12 kg/m  as calculated from the following:    Height as of 12/5/18: 1.727 m (5' 8\").    " Weight as of 12/5/18: 104.8 kg (231 lb).           reports that she has been smoking.  she has never used smokeless tobacco.      Counseling Resources:  ATP IV Guidelines  Pooled Cohorts Equation Calculator  Breast Cancer Risk Calculator  FRAX Risk Assessment  ICSI Preventive Guidelines  Dietary Guidelines for Americans, 2010  USDA's MyPlate  ASA Prophylaxis  Lung CA Screening    Fili Vasquez MD, MD  [facility]

## 2018-12-22 DIAGNOSIS — E78.5 HYPERLIPIDEMIA LDL GOAL <130: ICD-10-CM

## 2018-12-22 NOTE — RESULT ENCOUNTER NOTE
Onesimo Duong,    I had the opportunity to review your recent labs and a summary of your labs reads as follows:    Your complete blood counts show no sign of anemia, normal white blood cell count and platelets.  Your comprehensive metabolic panel showed normal renal function, normal liver function, and stable elevated fasting blood glucose indicating evidence of  Pre-diabetes mellitus.  Your hemoglobin A1c is also indicative of need for improved diet and weight loss  Your fasting lipid panel show  - normal HDL (good) cholesterol -as your goal is greater than 40  - low LDL (bad) cholesterol as your goal is less than 130 - however there has recently been a change in the guidelines for treatment of cholesterol and we now are recommending that a the decision to take statin medications be based more so on the calculated cardiac risk as estimated by The American College of Cardiology Risk .  Given your estimated risk of coronary artery disease > 7.5% I would recommend starting a new medication of atorvastatin 20 mg   - elevated triglyceride levels    The 10-year ASCVD risk score (Kaylee UREÑA Jr., et al., 2013) is: 18.5%    Values used to calculate the score:      Age: 58 years      Sex: Female      Is Non- : No      Diabetic: No      Tobacco smoker: Yes      Systolic Blood Pressure: 146 mmHg      Is BP treated: Yes      HDL Cholesterol: 30 mg/dL      Total Cholesterol: 219 mg/dL      Sincerely,  Fili Vasquez MD

## 2018-12-22 NOTE — TELEPHONE ENCOUNTER
Can we call Rhoda Ayala and let her know that     Hi Rhoda,    I had the opportunity to review your recent labs and a summary of your labs reads as follows:    Your complete blood counts show no sign of anemia, normal white blood cell count and platelets.  Your comprehensive metabolic panel showed normal renal function, normal liver function, and stable elevated fasting blood glucose indicating evidence of  Pre-diabetes mellitus.  Your hemoglobin A1c is also indicative of need for improved diet and weight loss  Your fasting lipid panel show  - normal HDL (good) cholesterol -as your goal is greater than 40  - low LDL (bad) cholesterol as your goal is less than 130 - however there has recently been a change in the guidelines for treatment of cholesterol and we now are recommending that a the decision to take statin medications be based more so on the calculated cardiac risk as estimated by The American College of Cardiology Risk .  Given your estimated risk of coronary artery disease > 7.5% I would recommend starting a new medication of atorvastatin 20 mg   - very elevated triglyceride levels    The 10-year ASCVD risk score (Kayleeshira UREÑA Jr., et al., 2013) is: 18.5%    Values used to calculate the score:      Age: 58 years      Sex: Female      Is Non- : No      Diabetic: No      Tobacco smoker: Yes      Systolic Blood Pressure: 146 mmHg      Is BP treated: Yes      HDL Cholesterol: 30 mg/dL      Total Cholesterol: 219 mg/dL        Sincerely,  Fili Vasquez MD

## 2018-12-26 LAB
COPATH REPORT: NORMAL
HCV AB SERPL QL IA: NONREACTIVE
HIV 1+2 AB+HIV1 P24 AG SERPL QL IA: NONREACTIVE
PAP: NORMAL

## 2018-12-26 RX ORDER — ATORVASTATIN CALCIUM 20 MG/1
20 TABLET, FILM COATED ORAL DAILY
Qty: 90 TABLET | Refills: 3 | Status: SHIPPED | OUTPATIENT
Start: 2018-12-26 | End: 2019-12-20

## 2018-12-26 NOTE — TELEPHONE ENCOUNTER
TO PCP:     Patient is agreeable to plan - will plan to start atorvastatin     She is also asking for results of recent pap smear    Please advise   Myrna UGALDE RN

## 2018-12-28 LAB
FINAL DIAGNOSIS: NORMAL
HPV HR 12 DNA CVX QL NAA+PROBE: NEGATIVE
HPV16 DNA SPEC QL NAA+PROBE: NEGATIVE
HPV18 DNA SPEC QL NAA+PROBE: NEGATIVE
SPECIMEN DESCRIPTION: NORMAL
SPECIMEN SOURCE CVX/VAG CYTO: NORMAL

## 2019-01-09 DIAGNOSIS — I10 ESSENTIAL HYPERTENSION: ICD-10-CM

## 2019-01-09 RX ORDER — LOSARTAN POTASSIUM 100 MG/1
TABLET ORAL
Refills: 0
Start: 2019-01-09

## 2019-01-09 NOTE — TELEPHONE ENCOUNTER
"losartan (COZAAR) 100 MG tablet  Last Written Prescription Date:  12/21/18  Last Fill Quantity: 30,  # refills: 11   Last office visit: 12/21/2018 with prescribing provider:  JANEY    Future Office Visit:          Requested Prescriptions   Pending Prescriptions Disp Refills     losartan (COZAAR) 100 MG tablet [Pharmacy Med Name: LOSARTAN 100MG TABLETS] 30 tablet 0     Sig: TAKE 1 TABLET BY MOUTH DAILY    Angiotensin-II Receptors Failed - 1/9/2019  3:44 AM       Failed - Blood pressure under 140/90 in past 12 months    BP Readings from Last 3 Encounters:   12/21/18 (!) 146/94   12/05/18 (!) 174/98   01/19/18 (!) 166/93                Passed - Recent (12 mo) or future (30 days) visit within the authorizing provider's specialty    Patient had office visit in the last 12 months or has a visit in the next 30 days with authorizing provider or within the authorizing provider's specialty.  See \"Patient Info\" tab in inbasket, or \"Choose Columns\" in Meds & Orders section of the refill encounter.             Passed - Medication is active on med list       Passed - Patient is age 18 or older       Passed - No active pregnancy on record       Passed - Normal serum creatinine on file in past 12 months    Recent Labs   Lab Test 12/21/18  1037   CR 0.81            Passed - Normal serum potassium on file in past 12 months    Recent Labs   Lab Test 12/21/18  1037   POTASSIUM 3.8                   Passed - No positive pregnancy test in past 12 months          "

## 2019-01-10 DIAGNOSIS — G43.009 MIGRAINE WITHOUT AURA AND WITHOUT STATUS MIGRAINOSUS, NOT INTRACTABLE: ICD-10-CM

## 2019-01-11 NOTE — TELEPHONE ENCOUNTER
Routing refill request to provider for review/approval because:  BP elevated     Myrna UGALDE RN

## 2019-01-14 RX ORDER — SUMATRIPTAN 100 MG/1
TABLET, FILM COATED ORAL
Qty: 9 TABLET | Refills: 2 | Status: SHIPPED | OUTPATIENT
Start: 2019-01-14 | End: 2019-05-09

## 2019-02-24 DIAGNOSIS — J45.31 EXTRINSIC ASTHMA, MILD PERSISTENT, WITH ACUTE EXACERBATION: ICD-10-CM

## 2019-02-25 NOTE — TELEPHONE ENCOUNTER
"albuterol (PROAIR HFA/PROVENTIL HFA/VENTOLIN HFA) 108 (90 BASE) MCG/ACT Inhaler  Last Written Prescription Date:  1/9/18  Last Fill Quantity: 1,  # refills: 11   Last office visit: 12/21/2018 with prescribing provider:  josiane    Future Office Visit:            Requested Prescriptions   Pending Prescriptions Disp Refills     PROAIR  (90 Base) MCG/ACT inhaler [Pharmacy Med Name: PROAIR HFA ORAL INH (200  PFS) 8.5G] 8.5 g 0     Sig: INHALE 2 PUFFS INTO THE LUNGS EVERY 6 HOURS AS NEEDED FOR SHORTNESS OF BREATH OR DIFFICULT BREATHING OR WHEEZING    Asthma Maintenance Inhalers - Anticholinergics Failed - 2/24/2019  8:13 AM       Failed - Asthma control assessment score within normal limits in last 6 months    Please review ACT score.          Passed - Patient is age 12 years or older       Passed - Medication is active on med list       Passed - Recent (6 mo) or future (30 days) visit within the authorizing provider's specialty    Patient had office visit in the last 6 months or has a visit in the next 30 days with authorizing provider or within the authorizing provider's specialty.  See \"Patient Info\" tab in inbasket, or \"Choose Columns\" in Meds & Orders section of the refill encounter.              "

## 2019-02-26 RX ORDER — ALBUTEROL SULFATE 90 UG/1
AEROSOL, METERED RESPIRATORY (INHALATION)
Qty: 8.5 G | Refills: 11 | Status: SHIPPED | OUTPATIENT
Start: 2019-02-26 | End: 2019-12-18

## 2019-02-26 NOTE — TELEPHONE ENCOUNTER
Routing refill request to provider for review/approval because:  Last ACT < 20 12/5/18, score of 17.   Asthma not mentioned at 12/21/18 visit.  Please advise/authorize if appropriate.  Thanks,  Ashley Castañeda RN

## 2019-04-08 ENCOUNTER — TRANSFERRED RECORDS (OUTPATIENT)
Dept: HEALTH INFORMATION MANAGEMENT | Facility: CLINIC | Age: 59
End: 2019-04-08

## 2019-05-09 DIAGNOSIS — G43.009 MIGRAINE WITHOUT AURA AND WITHOUT STATUS MIGRAINOSUS, NOT INTRACTABLE: ICD-10-CM

## 2019-05-09 NOTE — TELEPHONE ENCOUNTER
"SUMAtriptan (IMITREX) 100 MG tablet  Last Written Prescription Date:  1/14/19  Last Fill Quantity: 9,  # refills: 2   Last office visit: 12/21/2018 with prescribing provider:  Christina    Future Office Visit:        Requested Prescriptions   Pending Prescriptions Disp Refills     SUMAtriptan (IMITREX) 100 MG tablet [Pharmacy Med Name: SUMATRIPTAN 100MG TABLETS] 9 tablet 0     Sig: TAKE ONE TABLET BY MOUTH AT ONSET OF HEADACHE AS DIRECTED       Serotonin Agonists Failed - 5/9/2019  3:45 AM        Failed - Blood pressure under 140/90 in past 12 months     BP Readings from Last 3 Encounters:   12/21/18 (!) 146/94   12/05/18 (!) 174/98   01/19/18 (!) 166/93                 Failed - Serotonin Agonist request needs review.     Please review patient's record. If patient has had 8 or more treatments in the past month, please forward to provider.          Passed - Recent (12 mo) or future (30 days) visit within the authorizing provider's specialty     Patient had office visit in the last 12 months or has a visit in the next 30 days with authorizing provider or within the authorizing provider's specialty.  See \"Patient Info\" tab in inbasket, or \"Choose Columns\" in Meds & Orders section of the refill encounter.              Passed - Medication is active on med list        Passed - Patient is age 18 or older        Passed - No active pregnancy on record        Passed - No positive pregnancy test in past 12 months          "

## 2019-05-10 NOTE — TELEPHONE ENCOUNTER
Routing refill request to provider for review/approval because:  BP out of range per RN Refill Protocol     Pt was advised to f/u in Feb 2019-     Pended #9 with note to schedule appt     Please review and authorize if appropriate,     Thank you,   Keli SAMPSON RN

## 2019-05-11 RX ORDER — SUMATRIPTAN 100 MG/1
TABLET, FILM COATED ORAL
Qty: 9 TABLET | Refills: 0 | Status: SHIPPED | OUTPATIENT
Start: 2019-05-11 | End: 2019-07-03

## 2019-05-28 ENCOUNTER — TRANSFERRED RECORDS (OUTPATIENT)
Dept: HEALTH INFORMATION MANAGEMENT | Facility: CLINIC | Age: 59
End: 2019-05-28

## 2019-07-03 DIAGNOSIS — G43.009 MIGRAINE WITHOUT AURA AND WITHOUT STATUS MIGRAINOSUS, NOT INTRACTABLE: ICD-10-CM

## 2019-07-03 NOTE — TELEPHONE ENCOUNTER
"SUMAtriptan (IMITREX) 100 MG tablet 9 tablet 0 5/11/2019  No   Sig: TAKE ONE TABLET BY MOUTH AT ONSET OF HEADACHE AS DIRECTED     Requested Prescriptions   Pending Prescriptions Disp Refills     SUMAtriptan (IMITREX) 100 MG tablet [Pharmacy Med Name: SUMATRIPTAN 100MG TABLETS] 9 tablet 0     Sig: TAKE 1 TABLET BY MOUTH AT ONSET OF HEADACHE AS DIRECTED.       Serotonin Agonists Failed - 7/3/2019 11:14 AM        Failed - Blood pressure under 140/90 in past 12 months     BP Readings from Last 3 Encounters:   12/21/18 (!) 146/94   12/05/18 (!) 174/98   01/19/18 (!) 166/93                 Failed - Serotonin Agonist request needs review.     Please review patient's record. If patient has had 8 or more treatments in the past month, please forward to provider.          Passed - Recent (12 mo) or future (30 days) visit within the authorizing provider's specialty     Patient had office visit in the last 12 months or has a visit in the next 30 days with authorizing provider or within the authorizing provider's specialty.  See \"Patient Info\" tab in inbasket, or \"Choose Columns\" in Meds & Orders section of the refill encounter.              Passed - Medication is active on med list        Passed - Patient is age 18 or older        Passed - No active pregnancy on record        Passed - No positive pregnancy test in past 12 months        "

## 2019-07-03 NOTE — TELEPHONE ENCOUNTER
"  SUMAtriptan (IMITREX) 100 MG tablet 9 tablet 0 5/11/2019       Last Written Prescription Date:  05/11/2019  Last Fill Quantity: 9,  # refills: 0   Last office visit: 12/21/2018 with prescribing provider:     Future Office Visit:  Unknown      Requested Prescriptions   Pending Prescriptions Disp Refills     SUMAtriptan (IMITREX) 100 MG tablet [Pharmacy Med Name: SUMATRIPTAN 100MG TABLETS] 9 tablet 0     Sig: TAKE 1 TABLET BY MOUTH AT ONSET OF HEADACHE AS DIRECTED.       Serotonin Agonists Failed - 7/3/2019 11:14 AM        Failed - Blood pressure under 140/90 in past 12 months     BP Readings from Last 3 Encounters:   12/21/18 (!) 146/94   12/05/18 (!) 174/98   01/19/18 (!) 166/93                 Failed - Serotonin Agonist request needs review.     Please review patient's record. If patient has had 8 or more treatments in the past month, please forward to provider.          Passed - Recent (12 mo) or future (30 days) visit within the authorizing provider's specialty     Patient had office visit in the last 12 months or has a visit in the next 30 days with authorizing provider or within the authorizing provider's specialty.  See \"Patient Info\" tab in inbasket, or \"Choose Columns\" in Meds & Orders section of the refill encounter.              Passed - Medication is active on med list        Passed - Patient is age 18 or older        Passed - No active pregnancy on record        Passed - No positive pregnancy test in past 12 months          "

## 2019-07-05 RX ORDER — SUMATRIPTAN 100 MG/1
TABLET, FILM COATED ORAL
Qty: 9 TABLET | Refills: 0 | Status: SHIPPED | OUTPATIENT
Start: 2019-07-05 | End: 2019-12-18

## 2019-10-03 ENCOUNTER — HEALTH MAINTENANCE LETTER (OUTPATIENT)
Age: 59
End: 2019-10-03

## 2019-10-10 ENCOUNTER — TRANSFERRED RECORDS (OUTPATIENT)
Dept: HEALTH INFORMATION MANAGEMENT | Facility: CLINIC | Age: 59
End: 2019-10-10

## 2019-11-21 ENCOUNTER — TRANSFERRED RECORDS (OUTPATIENT)
Dept: HEALTH INFORMATION MANAGEMENT | Facility: CLINIC | Age: 59
End: 2019-11-21

## 2019-11-21 LAB — TSH SERPL-ACNC: 1.25 ULU/ML (ref 0.3–5)

## 2019-11-24 ENCOUNTER — TRANSFERRED RECORDS (OUTPATIENT)
Dept: HEALTH INFORMATION MANAGEMENT | Facility: CLINIC | Age: 59
End: 2019-11-24

## 2019-12-06 DIAGNOSIS — I10 BENIGN ESSENTIAL HYPERTENSION: ICD-10-CM

## 2019-12-06 NOTE — TELEPHONE ENCOUNTER
"amLODIPine (NORVASC) 5 MG tablet 30 tablet 11 12/21/2018     Last Written Prescription Date:  12/21/18  Last Fill Quantity: 30,  # refills: 11   Last office visit: 12/21/2018 with prescribing provider:  Christina   Future Office Visit:   Next 5 appointments (look out 90 days)    Dec 18, 2019  9:00 AM CST  [facility] [address] [facility] [address]  [phone]         hydrochlorothiazide (HYDRODIURIL) 25 MG tablet 30 tablet 11 12/21/2018         Last Written Prescription Date:  12/21/18  Last Fill Quantity: 30,  # refills: 11   Last office visit: 12/21/2018 with prescribing provider:  Christina   Future Office Visit:   Next 5 appointments (look out 90 days)    Dec 18, 2019  9:00 AM CST  [facility] [address] [facility] [address]  [phone]           Requested Prescriptions   Pending Prescriptions Disp Refills     amLODIPine (NORVASC) 5 MG tablet [Pharmacy Med Name: AMLODIPINE BESYLATE 5MG TABLETS] 30 tablet 0     Sig: TAKE 1 TABLET(5 MG) BY MOUTH DAILY       Calcium Channel Blockers Protocol  Failed - 12/6/2019  4:59 AM        Failed - Blood pressure under 140/90 in past 12 months     BP Readings from Last 3 Encounters:   12/21/18 (!) 146/94   12/05/18 (!) 174/98   01/19/18 (!) 166/93                 Passed - Recent (12 mo) or future (30 days) visit within the authorizing provider's specialty     Patient has had an office visit with the authorizing provider or a provider within the authorizing providers department within the previous 12 mos or has a future within next 30 days. See \"Patient Info\" tab in inbasket, or \"Choose Columns\" in Meds & Orders section of the refill encounter.              Passed - Medication is active on med list        Passed - Patient is age 18 or older        Passed - No active pregnancy on record        " "Passed - Normal serum creatinine on file in past 12 months     Recent Labs   Lab Test 12/21/18  1037   CR 0.81             Passed - No positive pregnancy test in past 12 months        hydrochlorothiazide (HYDRODIURIL) 25 MG tablet [Pharmacy Med Name: HYDROCHLOROTHIAZIDE 25MG TABLETS] 30 tablet 0     Sig: TAKE 1 TABLET(25 MG) BY MOUTH DAILY       Diuretics (Including Combos) Protocol Failed - 12/6/2019  4:59 AM        Failed - Blood pressure under 140/90 in past 12 months     BP Readings from Last 3 Encounters:   12/21/18 (!) 146/94   12/05/18 (!) 174/98   01/19/18 (!) 166/93                 Passed - Recent (12 mo) or future (30 days) visit within the authorizing provider's specialty     Patient has had an office visit with the authorizing provider or a provider within the authorizing providers department within the previous 12 mos or has a future within next 30 days. See \"Patient Info\" tab in inbasket, or \"Choose Columns\" in Meds & Orders section of the refill encounter.              Passed - Medication is active on med list        Passed - Patient is age 18 or older        Passed - No active pregancy on record        Passed - Normal serum creatinine on file in past 12 months     Recent Labs   Lab Test 12/21/18  1037   CR 0.81              Passed - Normal serum potassium on file in past 12 months     Recent Labs   Lab Test 12/21/18  1037   POTASSIUM 3.8                    Passed - Normal serum sodium on file in past 12 months     Recent Labs   Lab Test 12/21/18  1037                 Passed - No positive pregnancy test in past 12 months        No flowsheet data found.      "

## 2019-12-08 DIAGNOSIS — I10 BENIGN ESSENTIAL HYPERTENSION: ICD-10-CM

## 2019-12-09 ENCOUNTER — TELEPHONE (OUTPATIENT)
Dept: FAMILY MEDICINE | Facility: CLINIC | Age: 59
End: 2019-12-09

## 2019-12-09 RX ORDER — AMLODIPINE BESYLATE 5 MG/1
TABLET ORAL
Qty: 30 TABLET | Refills: 0 | Status: SHIPPED | OUTPATIENT
Start: 2019-12-09 | End: 2020-01-06

## 2019-12-09 RX ORDER — LOSARTAN POTASSIUM 100 MG/1
TABLET ORAL
Qty: 30 TABLET | Refills: 0 | Status: SHIPPED | OUTPATIENT
Start: 2019-12-09 | End: 2019-12-18

## 2019-12-09 RX ORDER — HYDROCHLOROTHIAZIDE 25 MG/1
TABLET ORAL
Qty: 30 TABLET | Refills: 0 | Status: SHIPPED | OUTPATIENT
Start: 2019-12-09 | End: 2019-12-18

## 2019-12-09 NOTE — TELEPHONE ENCOUNTER
Medication filled 1 time as pt is due for a follow-up in clinic and last BPs high. Patient is already scheduled for upcoming appointment.  Myrna UGALDE RN

## 2019-12-09 NOTE — TELEPHONE ENCOUNTER
Medication filled 1 time as pt is due for a follow-up in clinic and last 3/3 BPs high. Patient is already scheduled for upcoming appointment.  Myrna UGALDE RN

## 2019-12-09 NOTE — TELEPHONE ENCOUNTER
Reason for Call:  Medication or medication refill:    Do you use a [facility]?  Name of the pharmacy and phone number for the current request:    [id number] [address] [address]      Name of the medication requested:   JamieMaryjo  amLODIPine (NORVASC) 5 MG tablet 30 tablet 11 12/21/2018 12/21/2019 --   Sig - Route: Take 1 tablet (5 mg) by mouth daily - Oral   Sent to pharmacy as: amLODIPine (NORVASC) 5 MG tablet   Class: E-Prescribe   Order: [id number]   E-Prescribing Status: Receipt confirmed by pharmacy (12/21/2018 10:11 AM CST)       Femal59 yrs, 1960  MRN:   [id number]    Other request: pt has appt on 12/18/2019    Call taken on 12/9/2019 at 8:03 AM by Almaz Harry

## 2019-12-09 NOTE — TELEPHONE ENCOUNTER
" Disp Refills Start End GREGORIA   losartan (COZAAR) 100 MG tablet 30 tablet 11 12/21/2018  No     Last office visit: 12/21/2018 with prescribing provider:  PCP   Future Office Visit:   Next 5 appointments (look out 90 days)    Dec 18, 2019  9:00 AM CST  [facility] Visit with Fili [facility]quez MD  [facility] [address] [facility] [address] [phone]         Requested Prescriptions   Pending Prescriptions Disp Refills     losartan (COZAAR) 100 MG tablet [Pharmacy Med Name: LOSARTAN 100MG TABLETS] 30 tablet 0     Sig: TAKE 1 TABLET(100 MG) BY MOUTH DAILY       Angiotensin-II Receptors Failed - 12/8/2019  3:45 AM        Failed - Last blood pressure under 140/90 in past 12 months     BP Readings from Last 3 Encounters:   12/21/18 (!) 146/94   12/05/18 (!) 174/98   01/19/18 (!) 166/93                 Passed - Recent (12 mo) or future (30 days) visit within the authorizing provider's specialty     Patient has had an office visit with the authorizing provider or a provider within the authorizing providers department within the previous 12 mos or has a future within next 30 days. See \"Patient Info\" tab in inbasket, or \"Choose Columns\" in Meds & Orders section of the refill encounter.              Passed - Medication is active on med list        Passed - Patient is age 18 or older        Passed - No active pregnancy on record        Passed - Normal serum creatinine on file in past 12 months     Recent Labs   Lab Test 12/21/18  1037   CR 0.81             Passed - Normal serum potassium on file in past 12 months     Recent Labs   Lab Test 12/21/18  1037   POTASSIUM 3.8                    Passed - No positive pregnancy test in past 12 months      Future refills by PCP Dr. Fili Vasquez MD with phone number [phone].  "

## 2019-12-18 ENCOUNTER — TELEPHONE (OUTPATIENT)
Dept: FAMILY MEDICINE | Facility: CLINIC | Age: 59
End: 2019-12-18

## 2019-12-18 ENCOUNTER — OFFICE VISIT (OUTPATIENT)
Dept: FAMILY MEDICINE | Facility: CLINIC | Age: 59
End: 2019-12-18
Payer: COMMERCIAL

## 2019-12-18 VITALS
HEART RATE: 74 BPM | WEIGHT: 230 LBS | HEIGHT: 68 IN | BODY MASS INDEX: 34.86 KG/M2 | DIASTOLIC BLOOD PRESSURE: 80 MMHG | SYSTOLIC BLOOD PRESSURE: 115 MMHG

## 2019-12-18 DIAGNOSIS — I10 ESSENTIAL HYPERTENSION: ICD-10-CM

## 2019-12-18 DIAGNOSIS — M54.9 UPPER BACK PAIN ON RIGHT SIDE: Primary | ICD-10-CM

## 2019-12-18 DIAGNOSIS — G43.009 MIGRAINE WITHOUT AURA AND WITHOUT STATUS MIGRAINOSUS, NOT INTRACTABLE: ICD-10-CM

## 2019-12-18 DIAGNOSIS — J45.31 EXTRINSIC ASTHMA, MILD PERSISTENT, WITH ACUTE EXACERBATION: ICD-10-CM

## 2019-12-18 DIAGNOSIS — E03.9 HYPOTHYROIDISM, UNSPECIFIED TYPE: ICD-10-CM

## 2019-12-18 DIAGNOSIS — I10 BENIGN ESSENTIAL HYPERTENSION: ICD-10-CM

## 2019-12-18 DIAGNOSIS — M79.602 ARM PAIN, LEFT: ICD-10-CM

## 2019-12-18 DIAGNOSIS — Z13.6 CARDIOVASCULAR SCREENING; LDL GOAL LESS THAN 130: ICD-10-CM

## 2019-12-18 LAB
ALBUMIN SERPL-MCNC: 4 G/DL (ref 3.4–5)
ALP SERPL-CCNC: 109 U/L (ref 40–150)
ALT SERPL W P-5'-P-CCNC: 45 U/L (ref 0–50)
ANION GAP SERPL CALCULATED.3IONS-SCNC: 5 MMOL/L (ref 3–14)
AST SERPL W P-5'-P-CCNC: 22 U/L (ref 0–45)
BILIRUB SERPL-MCNC: 0.2 MG/DL (ref 0.2–1.3)
BUN SERPL-MCNC: 16 MG/DL (ref 7–30)
CALCIUM SERPL-MCNC: 9.6 MG/DL (ref 8.5–10.1)
CHLORIDE SERPL-SCNC: 106 MMOL/L (ref 94–109)
CHOLEST SERPL-MCNC: 161 MG/DL
CO2 SERPL-SCNC: 28 MMOL/L (ref 20–32)
CREAT SERPL-MCNC: 0.76 MG/DL (ref 0.52–1.04)
ERYTHROCYTE [DISTWIDTH] IN BLOOD BY AUTOMATED COUNT: 13.1 % (ref 10–15)
GFR SERPL CREATININE-BSD FRML MDRD: 85 ML/MIN/{1.73_M2}
GLUCOSE SERPL-MCNC: 111 MG/DL (ref 70–99)
HCT VFR BLD AUTO: 41.1 % (ref 35–47)
HDLC SERPL-MCNC: 32 MG/DL
HGB BLD-MCNC: 13.8 G/DL (ref 11.7–15.7)
LDLC SERPL CALC-MCNC: ABNORMAL MG/DL
LDLC SERPL DIRECT ASSAY-MCNC: 76 MG/DL
MCH RBC QN AUTO: 30.5 PG (ref 26.5–33)
MCHC RBC AUTO-ENTMCNC: 33.6 G/DL (ref 31.5–36.5)
MCV RBC AUTO: 91 FL (ref 78–100)
NONHDLC SERPL-MCNC: 129 MG/DL
PLATELET # BLD AUTO: 348 10E9/L (ref 150–450)
POTASSIUM SERPL-SCNC: 4 MMOL/L (ref 3.4–5.3)
PROT SERPL-MCNC: 7.6 G/DL (ref 6.8–8.8)
RBC # BLD AUTO: 4.53 10E12/L (ref 3.8–5.2)
SODIUM SERPL-SCNC: 139 MMOL/L (ref 133–144)
TRIGL SERPL-MCNC: 492 MG/DL
WBC # BLD AUTO: 12 10E9/L (ref 4–11)

## 2019-12-18 PROCEDURE — 80061 LIPID PANEL: CPT | Performed by: INTERNAL MEDICINE

## 2019-12-18 PROCEDURE — 36415 COLL VENOUS BLD VENIPUNCTURE: CPT | Performed by: INTERNAL MEDICINE

## 2019-12-18 PROCEDURE — 80053 COMPREHEN METABOLIC PANEL: CPT | Performed by: INTERNAL MEDICINE

## 2019-12-18 PROCEDURE — 83721 ASSAY OF BLOOD LIPOPROTEIN: CPT | Mod: 59 | Performed by: INTERNAL MEDICINE

## 2019-12-18 PROCEDURE — 85027 COMPLETE CBC AUTOMATED: CPT | Performed by: INTERNAL MEDICINE

## 2019-12-18 PROCEDURE — 99214 OFFICE O/P EST MOD 30 MIN: CPT | Performed by: INTERNAL MEDICINE

## 2019-12-18 RX ORDER — CYCLOBENZAPRINE HCL 5 MG
5 TABLET ORAL 3 TIMES DAILY PRN
Qty: 42 TABLET | Refills: 11 | Status: SHIPPED | OUTPATIENT
Start: 2019-12-18 | End: 2020-12-09

## 2019-12-18 RX ORDER — LOSARTAN POTASSIUM 100 MG/1
100 TABLET ORAL DAILY
Qty: 90 TABLET | Refills: 3 | Status: SHIPPED | OUTPATIENT
Start: 2019-12-18 | End: 2020-11-10

## 2019-12-18 RX ORDER — HYDROCHLOROTHIAZIDE 25 MG/1
25 TABLET ORAL DAILY
Qty: 90 TABLET | Refills: 3 | Status: SHIPPED | OUTPATIENT
Start: 2019-12-18 | End: 2020-11-10

## 2019-12-18 RX ORDER — ALBUTEROL SULFATE 90 UG/1
2 AEROSOL, METERED RESPIRATORY (INHALATION) EVERY 6 HOURS PRN
Qty: 8.5 G | Refills: 11 | Status: SHIPPED | OUTPATIENT
Start: 2019-12-18 | End: 2020-11-10

## 2019-12-18 RX ORDER — SUMATRIPTAN 100 MG/1
TABLET, FILM COATED ORAL
Qty: 9 TABLET | Refills: 11 | Status: SHIPPED | OUTPATIENT
Start: 2019-12-18 | End: 2020-11-10

## 2019-12-18 ASSESSMENT — MIFFLIN-ST. JEOR: SCORE: 1656.77

## 2019-12-18 NOTE — PATIENT INSTRUCTIONS
(M54.9) Upper back pain on right side  (primary encounter diagnosis)  Comment: OK to remove cyclobenzaprine  Plan: cyclobenzaprine (FLEXERIL) 5 MG tablet            (M79.602) Arm pain, left  Comment: as above   Plan: cyclobenzaprine (FLEXERIL) 5 MG tablet            (G43.009) Migraine without aura and without status migrainosus, not intractable  Comment: Continue imitrex as needed   Plan: SUMAtriptan (IMITREX) 100 MG tablet            (J45.31) Extrinsic asthma, mild persistent, with acute exacerbation  Comment: We will refill both pulmicort and albuterol  Plan: albuterol (PROAIR HFA) 108 (90 Base) MCG/ACT         inhaler, budesonide (PULMICORT FLEXHALER) 180         MCG/ACT inhaler

## 2019-12-18 NOTE — TELEPHONE ENCOUNTER
"Reason for Call:  Form, our goal is to have forms completed with 72 hours, however, some forms may require a visit or additional information.    Type of letter, form or note:  FMLA    Who is the form from?: Patient    Where did the form come from: Patient or family brought in       What clinic location was the form placed at?: [facility]    Where the form was placed: Given to MA/RN this morning with Jeri    What number is listed as a contact on the form?: Rhoda       Additional comments: section part A #1  Needs to say \"chronic condition up to 1 year\" instead of \"2 weeks\"    Pt would like a call when done: [phone]    Call taken on 12/18/2019 at 11:02 AM by Fermin Scales        "

## 2019-12-18 NOTE — PROGRESS NOTES
"[facility]  CLINIC PROGRESS NOTE    Subjective:   Upper back strain   Rhoda Ayala presents to the clinic for follow up of ongoing neck stiffness.  She is taking flexeril which helps immensely.  She has not had any issues with regards to her back strain at work.   Extrinsic asthma, mild persistent, with acute exacerbation   Recent illness on 10/31 through 11/14 with sore throat, body aches, coughing and wheezing improved with time and rest and use of inhalers.  She would like refill of both pulmicort and albuterol.      Past medical history, medications, allergies, social history, family history reviewed and updated in EPIC as of 12/18/2019 .    ROS  CONSTITUTIONAL: no fatigue, no unexpected change in weight  SKIN: following twice per day in dermatology   EYES: no acute vision problems or changes  ENT: no ear problems, no mouth problems, no throat problems  RESP: as above   CV: no chest pain, no palpitations, no new or worsening peripheral edema  GI: no nausea, no vomiting, no constipation, no diarrhea  : no frequency, no dysuria, no hematuria  MS: as above   PSYCHIATRIC: no changes in mood or affect      Objective:  Vitals  /80 (BP Location: Right arm, Patient Position: Chair)   Pulse 74   Ht 1.727 m (5' 8\")   Wt 104.3 kg (230 lb)   Breastfeeding No   BMI 34.97 kg/m    GEN: Alert Oriented x3 NAD  HEENT: Atraumatic, normocephalic, neck supple,   CV: RRR no murmurs or rubs  PULM: CTA no wheezes or crackles  ABD: Soft, nontender nondistended, no hepatosplenomegally  SKIN: No visible skin lesion or ulcerations  EXT:  no edema bilateral lower extremities  NEURO: Gait and station deferred, No focal neurologic deficits  PSYCH: Mood good, affect mood congruent    No images are attached to the encounter.    No results found for this or any previous visit (from the past 24 hour(s)).    Assessment/Plan:  Patient Instructions   (M54.9) Upper back pain on right side  (primary encounter " diagnosis)  Comment: OK to remove cyclobenzaprine  Plan: cyclobenzaprine (FLEXERIL) 5 MG tablet            (M79.602) Arm pain, left  Comment: as above   Plan: cyclobenzaprine (FLEXERIL) 5 MG tablet            (G43.009) Migraine without aura and without status migrainosus, not intractable  Comment: Continue imitrex as needed   Plan: SUMAtriptan (IMITREX) 100 MG tablet            (J45.31) Extrinsic asthma, mild persistent, with acute exacerbation  Comment: We will refill both pulmicort and albuterol  Plan: albuterol (PROAIR HFA) 108 (90 Base) MCG/ACT         inhaler, budesonide (PULMICORT FLEXHALER) 180         MCG/ACT inhaler               Follow up in 6 months    Disclaimer: This note consists of symbols derived from keyboarding, dictation and/or voice recognition software. As a result, there may be errors in the script that have gone undetected. Please consider this when interpreting information found in this chart.    Fili Vasquez MD  [phone]

## 2019-12-18 NOTE — TELEPHONE ENCOUNTER
Form updated and faxed over. Patient called and informed that form has been updated and faxed.    Hieu Goyal, JAILENE on 12/18/2019 at 2:48 PM

## 2019-12-19 ASSESSMENT — ASTHMA QUESTIONNAIRES: ACT_TOTALSCORE: 25

## 2019-12-21 NOTE — RESULT ENCOUNTER NOTE
Onesimo Duong,    I had the opportunity to review your recent labs and a summary of your labs reads as follows:    Your complete blood counts show no sign of anemia, nonspecific elevation of your white blood cell count and normal platelets.  Your comprehensive metabolic panel showed normal renal function, normal liver function, and stable elevated fasting blood glucose indicating no evidence of diabetes mellitus.  Your fasting lipid panel show  - stable HDL (good) cholesterol -as your goal is greater than 40  - low LDL (bad) cholesterol as your goal is less than 130 - continue atorvastatin   - stable elevated triglyceride levels      Sincerely,  Fili Vasquez MD

## 2020-01-03 DIAGNOSIS — I10 BENIGN ESSENTIAL HYPERTENSION: ICD-10-CM

## 2020-01-03 NOTE — TELEPHONE ENCOUNTER
"amLODIPine (NORVASC) 5 MG tablet 30 tablet 0 12/9/2019  No   Sig: TAKE 1 TABLET(5 MG) BY MOUTH DAILY     Last Written Prescription Date:  12/09/2019  Last Fill Quantity: 30,  # refills: 0   Last office visit: 12/18/2019 with prescribing provider:     Future Office Visit:      Requested Prescriptions   Pending Prescriptions Disp Refills     amLODIPine (NORVASC) 5 MG tablet [Pharmacy Med Name: AMLODIPINE BESYLATE 5MG TABLETS] 30 tablet 0     Sig: TAKE 1 TABLET BY MOUTH EVERY DAY       Calcium Channel Blockers Protocol  Passed - 1/3/2020  4:26 PM        Passed - Blood pressure under 140/90 in past 12 months     BP Readings from Last 3 Encounters:   12/18/19 115/80   12/21/18 (!) 146/94   12/05/18 (!) 174/98                 Passed - Recent (12 mo) or future (30 days) visit within the authorizing provider's specialty     Patient has had an office visit with the authorizing provider or a provider within the authorizing providers department within the previous 12 mos or has a future within next 30 days. See \"Patient Info\" tab in inbasket, or \"Choose Columns\" in Meds & Orders section of the refill encounter.              Passed - Medication is active on med list        Passed - Patient is age 18 or older        Passed - No active pregnancy on record        Passed - Normal serum creatinine on file in past 12 months     Recent Labs   Lab Test 12/18/19  0951   CR 0.76             Passed - No positive pregnancy test in past 12 months          "

## 2020-01-04 NOTE — TELEPHONE ENCOUNTER
"Last Written Prescription Date:  12/18/19  Last Fill Quantity: 90 tablet,  # refills: 3   Last office visit: 12/18/2019 with prescribing provider:  Christina   Future Office Visit:      Requested Prescriptions   Pending Prescriptions Disp Refills     hydrochlorothiazide (HYDRODIURIL) 25 MG tablet [Pharmacy Med Name: HYDROCHLOROTHIAZIDE 25MG TABLETS] 30 tablet      Sig: TAKE 1 TABLET BY MOUTH EVERY DAY       Diuretics (Including Combos) Protocol Passed - 1/3/2020 10:46 PM        Passed - Blood pressure under 140/90 in past 12 months     BP Readings from Last 3 Encounters:   12/18/19 115/80   12/21/18 (!) 146/94   12/05/18 (!) 174/98                 Passed - Recent (12 mo) or future (30 days) visit within the authorizing provider's specialty     Patient has had an office visit with the authorizing provider or a provider within the authorizing providers department within the previous 12 mos or has a future within next 30 days. See \"Patient Info\" tab in inbasket, or \"Choose Columns\" in Meds & Orders section of the refill encounter.              Passed - Medication is active on med list        Passed - Patient is age 18 or older        Passed - No active pregancy on record        Passed - Normal serum creatinine on file in past 12 months     Recent Labs   Lab Test 12/18/19  0951   CR 0.76              Passed - Normal serum potassium on file in past 12 months     Recent Labs   Lab Test 12/18/19  0951   POTASSIUM 4.0                    Passed - Normal serum sodium on file in past 12 months     Recent Labs   Lab Test 12/18/19  0951                 Passed - No positive pregnancy test in past 12 months          "

## 2020-01-06 RX ORDER — AMLODIPINE BESYLATE 5 MG/1
TABLET ORAL
Qty: 90 TABLET | Refills: 1 | Status: SHIPPED | OUTPATIENT
Start: 2020-01-06 | End: 2020-06-26

## 2020-01-06 RX ORDER — HYDROCHLOROTHIAZIDE 25 MG/1
TABLET ORAL
Qty: 30 TABLET | OUTPATIENT
Start: 2020-01-06

## 2020-01-06 NOTE — TELEPHONE ENCOUNTER
Prescription approved per [facility] Refill Protocol.  Marcia CHOPRA RN

## 2020-02-08 ENCOUNTER — HEALTH MAINTENANCE LETTER (OUTPATIENT)
Age: 60
End: 2020-02-08

## 2020-06-04 ENCOUNTER — TRANSFERRED RECORDS (OUTPATIENT)
Dept: HEALTH INFORMATION MANAGEMENT | Facility: CLINIC | Age: 60
End: 2020-06-04

## 2020-06-12 DIAGNOSIS — J45.31 EXTRINSIC ASTHMA, MILD PERSISTENT, WITH ACUTE EXACERBATION: ICD-10-CM

## 2020-06-12 RX ORDER — BUDESONIDE 180 UG/1
AEROSOL, POWDER RESPIRATORY (INHALATION)
Qty: 1 INHALER | Refills: 0 | Status: SHIPPED | OUTPATIENT
Start: 2020-06-12 | End: 2020-11-10

## 2020-06-26 DIAGNOSIS — I10 BENIGN ESSENTIAL HYPERTENSION: ICD-10-CM

## 2020-06-29 RX ORDER — AMLODIPINE BESYLATE 5 MG/1
5 TABLET ORAL DAILY
Qty: 90 TABLET | Refills: 0 | Status: SHIPPED | OUTPATIENT
Start: 2020-06-29 | End: 2020-09-25

## 2020-09-17 ENCOUNTER — TRANSFERRED RECORDS (OUTPATIENT)
Dept: HEALTH INFORMATION MANAGEMENT | Facility: CLINIC | Age: 60
End: 2020-09-17

## 2020-09-23 DIAGNOSIS — I10 BENIGN ESSENTIAL HYPERTENSION: ICD-10-CM

## 2020-09-23 NOTE — TELEPHONE ENCOUNTER
Refill request:    AMLODIPINE 5 MG TABLETS    Summary: Take 1 tablet (5 mg) by mouth daily, Disp-90 tablet,R-0, E-Prescribe   Dose, Route, Frequency: 5 mg, Oral, DAILY  Start: 6/29/2020  Ord/Sold: 6/29/2020

## 2020-09-25 RX ORDER — AMLODIPINE BESYLATE 5 MG/1
5 TABLET ORAL DAILY
Qty: 90 TABLET | Refills: 3 | Status: SHIPPED | OUTPATIENT
Start: 2020-09-25 | End: 2020-12-09

## 2020-11-07 ENCOUNTER — HEALTH MAINTENANCE LETTER (OUTPATIENT)
Age: 60
End: 2020-11-07

## 2020-11-10 ENCOUNTER — MYC REFILL (OUTPATIENT)
Dept: FAMILY MEDICINE | Facility: CLINIC | Age: 60
End: 2020-11-10

## 2020-11-10 DIAGNOSIS — I10 BENIGN ESSENTIAL HYPERTENSION: ICD-10-CM

## 2020-11-10 DIAGNOSIS — J45.31 EXTRINSIC ASTHMA, MILD PERSISTENT, WITH ACUTE EXACERBATION: ICD-10-CM

## 2020-11-10 DIAGNOSIS — G43.009 MIGRAINE WITHOUT AURA AND WITHOUT STATUS MIGRAINOSUS, NOT INTRACTABLE: ICD-10-CM

## 2020-11-10 DIAGNOSIS — R11.0 NAUSEA: ICD-10-CM

## 2020-11-10 DIAGNOSIS — J45.21 MILD INTERMITTENT EXTRINSIC ASTHMA WITH ACUTE EXACERBATION: ICD-10-CM

## 2020-11-10 DIAGNOSIS — M79.602 ARM PAIN, LEFT: ICD-10-CM

## 2020-11-10 DIAGNOSIS — M54.9 UPPER BACK PAIN ON RIGHT SIDE: ICD-10-CM

## 2020-11-10 RX ORDER — AMLODIPINE BESYLATE 5 MG/1
5 TABLET ORAL DAILY
Qty: 90 TABLET | Refills: 3 | Status: CANCELLED | OUTPATIENT
Start: 2020-11-10

## 2020-11-10 RX ORDER — CYCLOBENZAPRINE HCL 5 MG
5 TABLET ORAL 3 TIMES DAILY PRN
Qty: 42 TABLET | Refills: 11 | Status: CANCELLED | OUTPATIENT
Start: 2020-11-10

## 2020-11-10 RX ORDER — ONDANSETRON 4 MG/1
4 TABLET, FILM COATED ORAL EVERY 8 HOURS PRN
Qty: 18 TABLET | Refills: 1 | Status: CANCELLED | OUTPATIENT
Start: 2020-11-10

## 2020-11-11 RX ORDER — ALBUTEROL SULFATE 90 UG/1
2 AEROSOL, METERED RESPIRATORY (INHALATION) EVERY 6 HOURS PRN
Qty: 8.5 G | Refills: 0 | Status: SHIPPED | OUTPATIENT
Start: 2020-11-11 | End: 2020-12-09

## 2020-11-11 RX ORDER — SUMATRIPTAN 100 MG/1
TABLET, FILM COATED ORAL
Qty: 9 TABLET | Refills: 0 | Status: SHIPPED | OUTPATIENT
Start: 2020-11-11 | End: 2021-01-16

## 2020-11-11 RX ORDER — LOSARTAN POTASSIUM 100 MG/1
100 TABLET ORAL DAILY
Qty: 90 TABLET | Refills: 0 | Status: SHIPPED | OUTPATIENT
Start: 2020-11-11 | End: 2020-12-09

## 2020-11-11 RX ORDER — BUDESONIDE 180 UG/1
AEROSOL, POWDER RESPIRATORY (INHALATION)
Qty: 1 INHALER | Refills: 0 | Status: SHIPPED | OUTPATIENT
Start: 2020-11-11 | End: 2020-12-14

## 2020-11-11 RX ORDER — HYDROCHLOROTHIAZIDE 25 MG/1
25 TABLET ORAL DAILY
Qty: 90 TABLET | Refills: 0 | Status: SHIPPED | OUTPATIENT
Start: 2020-11-11 | End: 2020-12-09

## 2020-11-11 RX ORDER — ALBUTEROL SULFATE 0.83 MG/ML
2.5 SOLUTION RESPIRATORY (INHALATION) EVERY 6 HOURS PRN
Qty: 15 VIAL | Refills: 0 | Status: ON HOLD | OUTPATIENT
Start: 2020-11-11 | End: 2023-10-12

## 2020-12-07 DIAGNOSIS — E78.5 HYPERLIPIDEMIA LDL GOAL <130: ICD-10-CM

## 2020-12-07 DIAGNOSIS — J45.31 EXTRINSIC ASTHMA, MILD PERSISTENT, WITH ACUTE EXACERBATION: ICD-10-CM

## 2020-12-07 RX ORDER — ATORVASTATIN CALCIUM 20 MG/1
20 TABLET, FILM COATED ORAL DAILY
Qty: 90 TABLET | Refills: 0 | Status: CANCELLED | OUTPATIENT
Start: 2020-12-07

## 2020-12-09 ENCOUNTER — OFFICE VISIT (OUTPATIENT)
Dept: FAMILY MEDICINE | Facility: CLINIC | Age: 60
End: 2020-12-09
Payer: COMMERCIAL

## 2020-12-09 VITALS
TEMPERATURE: 98.8 F | BODY MASS INDEX: 35.79 KG/M2 | HEART RATE: 65 BPM | HEIGHT: 67 IN | OXYGEN SATURATION: 97 % | SYSTOLIC BLOOD PRESSURE: 97 MMHG | WEIGHT: 228 LBS | DIASTOLIC BLOOD PRESSURE: 67 MMHG

## 2020-12-09 DIAGNOSIS — I10 BENIGN ESSENTIAL HYPERTENSION: Primary | ICD-10-CM

## 2020-12-09 DIAGNOSIS — M79.602 ARM PAIN, LEFT: ICD-10-CM

## 2020-12-09 DIAGNOSIS — E78.5 HYPERLIPIDEMIA LDL GOAL <130: ICD-10-CM

## 2020-12-09 DIAGNOSIS — M54.9 UPPER BACK PAIN ON RIGHT SIDE: ICD-10-CM

## 2020-12-09 DIAGNOSIS — J45.31 EXTRINSIC ASTHMA, MILD PERSISTENT, WITH ACUTE EXACERBATION: ICD-10-CM

## 2020-12-09 DIAGNOSIS — Z23 NEED FOR VACCINATION: ICD-10-CM

## 2020-12-09 DIAGNOSIS — Z72.0 TOBACCO ABUSE: ICD-10-CM

## 2020-12-09 LAB
ALBUMIN SERPL-MCNC: 4 G/DL (ref 3.4–5)
ALP SERPL-CCNC: 115 U/L (ref 40–150)
ALT SERPL W P-5'-P-CCNC: 50 U/L (ref 0–50)
ANION GAP SERPL CALCULATED.3IONS-SCNC: 6 MMOL/L (ref 3–14)
AST SERPL W P-5'-P-CCNC: 21 U/L (ref 0–45)
BILIRUB SERPL-MCNC: 0.4 MG/DL (ref 0.2–1.3)
BUN SERPL-MCNC: 12 MG/DL (ref 7–30)
CALCIUM SERPL-MCNC: 9.6 MG/DL (ref 8.5–10.1)
CHLORIDE SERPL-SCNC: 106 MMOL/L (ref 94–109)
CHOLEST SERPL-MCNC: 154 MG/DL
CK SERPL-CCNC: 92 U/L (ref 30–225)
CO2 SERPL-SCNC: 27 MMOL/L (ref 20–32)
CREAT SERPL-MCNC: 0.84 MG/DL (ref 0.52–1.04)
ERYTHROCYTE [DISTWIDTH] IN BLOOD BY AUTOMATED COUNT: 12.6 % (ref 10–15)
GFR SERPL CREATININE-BSD FRML MDRD: 75 ML/MIN/{1.73_M2}
GLUCOSE SERPL-MCNC: 113 MG/DL (ref 70–99)
HCT VFR BLD AUTO: 39.4 % (ref 35–47)
HDLC SERPL-MCNC: 34 MG/DL
HGB BLD-MCNC: 13.2 G/DL (ref 11.7–15.7)
LDLC SERPL CALC-MCNC: 65 MG/DL
MCH RBC QN AUTO: 30.6 PG (ref 26.5–33)
MCHC RBC AUTO-ENTMCNC: 33.5 G/DL (ref 31.5–36.5)
MCV RBC AUTO: 91 FL (ref 78–100)
NONHDLC SERPL-MCNC: 120 MG/DL
PLATELET # BLD AUTO: 313 10E9/L (ref 150–450)
POTASSIUM SERPL-SCNC: 3.9 MMOL/L (ref 3.4–5.3)
PROT SERPL-MCNC: 7.4 G/DL (ref 6.8–8.8)
RBC # BLD AUTO: 4.32 10E12/L (ref 3.8–5.2)
SODIUM SERPL-SCNC: 139 MMOL/L (ref 133–144)
TRIGL SERPL-MCNC: 276 MG/DL
WBC # BLD AUTO: 8.6 10E9/L (ref 4–11)

## 2020-12-09 PROCEDURE — 85027 COMPLETE CBC AUTOMATED: CPT | Performed by: INTERNAL MEDICINE

## 2020-12-09 PROCEDURE — 82550 ASSAY OF CK (CPK): CPT | Performed by: INTERNAL MEDICINE

## 2020-12-09 PROCEDURE — 36415 COLL VENOUS BLD VENIPUNCTURE: CPT | Performed by: INTERNAL MEDICINE

## 2020-12-09 PROCEDURE — 99214 OFFICE O/P EST MOD 30 MIN: CPT | Mod: 25 | Performed by: INTERNAL MEDICINE

## 2020-12-09 PROCEDURE — 80061 LIPID PANEL: CPT | Performed by: INTERNAL MEDICINE

## 2020-12-09 PROCEDURE — 90732 PPSV23 VACC 2 YRS+ SUBQ/IM: CPT | Performed by: INTERNAL MEDICINE

## 2020-12-09 PROCEDURE — 80053 COMPREHEN METABOLIC PANEL: CPT | Performed by: INTERNAL MEDICINE

## 2020-12-09 PROCEDURE — 90471 IMMUNIZATION ADMIN: CPT | Performed by: INTERNAL MEDICINE

## 2020-12-09 RX ORDER — LOSARTAN POTASSIUM 100 MG/1
100 TABLET ORAL DAILY
Qty: 90 TABLET | Refills: 3 | Status: SHIPPED | OUTPATIENT
Start: 2020-12-09 | End: 2021-12-20

## 2020-12-09 RX ORDER — CYCLOBENZAPRINE HCL 5 MG
5 TABLET ORAL 3 TIMES DAILY PRN
Qty: 42 TABLET | Refills: 11 | Status: SHIPPED | OUTPATIENT
Start: 2020-12-09 | End: 2022-01-30

## 2020-12-09 RX ORDER — HYDROCHLOROTHIAZIDE 25 MG/1
25 TABLET ORAL DAILY
Qty: 90 TABLET | Refills: 3 | Status: SHIPPED | OUTPATIENT
Start: 2020-12-09 | End: 2020-12-09

## 2020-12-09 RX ORDER — AMLODIPINE BESYLATE 5 MG/1
5 TABLET ORAL DAILY
Qty: 90 TABLET | Refills: 3 | Status: SHIPPED | OUTPATIENT
Start: 2020-12-09 | End: 2022-01-12

## 2020-12-09 RX ORDER — ALBUTEROL SULFATE 90 UG/1
2 AEROSOL, METERED RESPIRATORY (INHALATION) EVERY 6 HOURS PRN
Qty: 8.5 G | Refills: 1 | Status: SHIPPED | OUTPATIENT
Start: 2020-12-09 | End: 2021-02-01

## 2020-12-09 RX ORDER — ATORVASTATIN CALCIUM 20 MG/1
20 TABLET, FILM COATED ORAL DAILY
Qty: 90 TABLET | Refills: 3 | Status: SHIPPED | OUTPATIENT
Start: 2020-12-09 | End: 2021-03-15 | Stop reason: ALTCHOICE

## 2020-12-09 RX ORDER — HYDROCHLOROTHIAZIDE 25 MG/1
25 TABLET ORAL
Qty: 90 TABLET | Refills: 3 | Status: SHIPPED | OUTPATIENT
Start: 2020-12-09 | End: 2022-01-28

## 2020-12-09 ASSESSMENT — MIFFLIN-ST. JEOR: SCORE: 1625.7

## 2020-12-09 NOTE — PATIENT INSTRUCTIONS
(I10) Benign essential hypertension  Comment: blood pressure is stable  Plan:     (E78.5) Hyperlipidemia LDL goal <130  Comment: continue atorvastatin   Plan:     (J45.31) Extrinsic asthma, mild persistent, with acute exacerbation  Comment: continue Advair  Plan:     (M79.602) Arm pain, left  Comment: no acute concerns - OK to continue Flexeril  Plan:     (M54.9) Upper back pain on right side  Comment:   Plan:      Smoking  Comment:Discussed options for nicitone replacement and possible use of bupropion

## 2020-12-09 NOTE — PROGRESS NOTES
"Subjective     Rhoda Ayala is a 60 year old female who presents to clinic today for the following health issues:    HPI              Benign essential hypertension   Blood pressure has been a bit low and light headed at tmies, but others, she feels well.  Has swelling in hands and feet at times and does well with hydrochlorothiazide   Hyperlipidemia LDL goal <130   Some leg pains off and on  Extrinsic asthma, mild persistent, with acute exacerbation    No conerns  Arm pain, left  Upper back pain on right side   Flexeril has been helpful  Smoking   She has been smoking 0.5 ppd 3 years - started with stress of caring for mother with dementia.  She recognizes the danger with smoking, but having difficulty quitting.     Review of Systems   Constitutional, HEENT, cardiovascular, pulmonary, GI, , musculoskeletal, neuro, skin, endocrine and psych systems are negative, except as otherwise noted.      Objective    BP 97/67 (BP Location: Left arm, Cuff Size: Adult Large)   Pulse 65   Temp 98.8  F (37.1  C) (Temporal)   Ht 1.7 m (5' 6.93\")   Wt 103.4 kg (228 lb)   SpO2 97%   Breastfeeding No   BMI 35.79 kg/m    Body mass index is 35.79 kg/m .  Physical Exam   GENERAL: healthy, alert and no distress  EYES: Eyes grossly normal to inspection, PERRL and conjunctivae and sclerae normal  HENT: ear canals and TM's normal, nose and mouth without ulcers or lesions  NECK: no adenopathy, no asymmetry, masses, or scars and thyroid normal to palpation  RESP: lungs clear to auscultation - no rales, rhonchi or wheezes  CV: regular rate and rhythm, normal S1 S2, no S3 or S4, no murmur, click or rub, no peripheral edema and peripheral pulses strong  ABDOMEN: soft, nontender, no hepatosplenomegaly, no masses and bowel sounds normal  MS: no gross musculoskeletal defects noted, no edema  SKIN: no suspicious lesions or rashes  NEURO: Normal strength and tone, mentation intact and speech normal  PSYCH: mentation appears normal, affect " "normal/bright            Assessment & Plan     Patient Instructions   (I10) Benign essential hypertension  Comment: blood pressure is stable  Plan:     (E78.5) Hyperlipidemia LDL goal <130  Comment: continue atorvastatin   Plan:     (J45.31) Extrinsic asthma, mild persistent, with acute exacerbation  Comment: continue Advair  Plan:     (M79.602) Arm pain, left  Comment: no acute concerns - OK to continue Flexeril  Plan:     (M54.9) Upper back pain on right side  Comment:   Plan:      Smoking  Comment:Discussed options for nicitone replacement and possible use of bupropion - refer to medical therapeutic management placed        Tobacco Cessation:   reports that she has been smoking cigarettes. She has a 1.50 pack-year smoking history. She has never used smokeless tobacco.  Tobacco Cessation Action Plan: Medication Therapy Management  (Referral to MTM)      BMI:   Estimated body mass index is 35.79 kg/m  as calculated from the following:    Height as of this encounter: 1.7 m (5' 6.93\").    Weight as of this encounter: 103.4 kg (228 lb).           No follow-ups on file.    Fili Vasquez MD, MD  [facility]    "

## 2020-12-09 NOTE — TELEPHONE ENCOUNTER
Prescription approved per [facility] Refill Protocol.  Marcia CHOPRA RN

## 2020-12-09 NOTE — PROGRESS NOTES
Prior to immunization administration, verified patients identity using patient s name and date of birth. Please see Immunization Activity for additional information.     Screening Questionnaire for Adult Immunization    Are you sick today?   No   Do you have allergies to medications, food, a vaccine component or latex?   No   Have you ever had a serious reaction after receiving a vaccination?   No   Do you have a long-term health problem with heart, lung, kidney, or metabolic disease (e.g., diabetes), asthma, a blood disorder, no spleen, complement component deficiency, a cochlear implant, or a spinal fluid leak?  Are you on long-term aspirin therapy?   No   Do you have cancer, leukemia, HIV/AIDS, or any other immune system problem?   No   Do you have a parent, brother, or sister with an immune system problem?   No   In the past 3 months, have you taken medications that affect  your immune system, such as prednisone, other steroids, or anticancer drugs; drugs for the treatment of rheumatoid arthritis, Crohn s disease, or psoriasis; or have you had radiation treatments?   No   Have you had a seizure, or a brain or other nervous system problem?   No   During the past year, have you received a transfusion of blood or blood    products, or been given immune (gamma) globulin or antiviral drug?   No   For women: Are you pregnant or is there a chance you could become       pregnant during the next month?   No   Have you received any vaccinations in the past 4 weeks?   No     Immunization questionnaire answers were all negative.        Per orders of Dr. Vasquez, injection of PPSV23 given by Kelly Kirkland CMA. Patient instructed to remain in clinic for 15 minutes afterwards, and to report any adverse reaction to me immediately.       Screening performed by Kelly Kirkland CMA on 12/9/2020 at 9:19 AM.

## 2020-12-10 DIAGNOSIS — J45.31 EXTRINSIC ASTHMA, MILD PERSISTENT, WITH ACUTE EXACERBATION: ICD-10-CM

## 2020-12-10 ASSESSMENT — ASTHMA QUESTIONNAIRES: ACT_TOTALSCORE: 22

## 2020-12-11 NOTE — TELEPHONE ENCOUNTER
PULMICORT 180 MCG FLEXHALER    Summary: INHALE 2 PUFFS INTO THE LUNGS TWICE DAILY Due for appointment. Please schedule: [phone], Disp-1 Inhaler, R-0, E-Prescribe   Start: 11/11/2020  Ord/Sold: 11/11/2020

## 2020-12-13 NOTE — RESULT ENCOUNTER NOTE
Onesimo Duong,    I had the opportunity to review your recent labs and a summary of your labs reads as follows:    Your complete blood counts show no sign of anemia, normal white blood cell count and platelets.  Your comprehensive metabolic panel showed normal renal function, normal liver function, and stable elevated fasting blood glucose indicating no evidence of diabetes mellitus.  Your fasting lipid panel show  - low HDL (good) cholesterol -as your goal is greater than 40  - low LDL (bad) cholesterol as your goal is less than 130  - improved triglyceride levels  Your CK (muscle enzyme) returned stable - continue Atorvastatin at current dose       Sincerely,  Fili Vasquez MD

## 2020-12-14 RX ORDER — BUDESONIDE 180 UG/1
AEROSOL, POWDER RESPIRATORY (INHALATION)
Qty: 3 INHALER | Refills: 1 | Status: SHIPPED | OUTPATIENT
Start: 2020-12-14 | End: 2023-12-26

## 2020-12-17 ENCOUNTER — VIRTUAL VISIT (OUTPATIENT)
Dept: PHARMACY | Facility: CLINIC | Age: 60
End: 2020-12-17
Attending: INTERNAL MEDICINE
Payer: COMMERCIAL

## 2020-12-17 DIAGNOSIS — E78.5 HYPERLIPIDEMIA LDL GOAL <130: ICD-10-CM

## 2020-12-17 DIAGNOSIS — J30.2 SEASONAL ALLERGIC RHINITIS, UNSPECIFIED TRIGGER: ICD-10-CM

## 2020-12-17 DIAGNOSIS — G43.009 MIGRAINE WITHOUT AURA AND WITHOUT STATUS MIGRAINOSUS, NOT INTRACTABLE: ICD-10-CM

## 2020-12-17 DIAGNOSIS — E03.9 HYPOTHYROIDISM, UNSPECIFIED TYPE: ICD-10-CM

## 2020-12-17 DIAGNOSIS — R52 INTERMITTENT PAIN: ICD-10-CM

## 2020-12-17 DIAGNOSIS — Z72.0 TOBACCO ABUSE: Primary | ICD-10-CM

## 2020-12-17 DIAGNOSIS — I10 ESSENTIAL HYPERTENSION: ICD-10-CM

## 2020-12-17 DIAGNOSIS — J45.21 MILD INTERMITTENT EXTRINSIC ASTHMA WITH ACUTE EXACERBATION: ICD-10-CM

## 2020-12-17 DIAGNOSIS — Z78.9 TAKES DIETARY SUPPLEMENTS: ICD-10-CM

## 2020-12-17 PROCEDURE — 99605 MTMS BY PHARM NP 15 MIN: CPT | Mod: TEL | Performed by: PHARMACIST

## 2020-12-17 PROCEDURE — 99607 MTMS BY PHARM ADDL 15 MIN: CPT | Mod: TEL | Performed by: PHARMACIST

## 2020-12-17 RX ORDER — GUAIFENESIN 600 MG/1
1200 TABLET, EXTENDED RELEASE ORAL 2 TIMES DAILY PRN
COMMUNITY

## 2020-12-17 RX ORDER — CETIRIZINE HYDROCHLORIDE 10 MG/1
10 TABLET ORAL EVERY MORNING
COMMUNITY

## 2020-12-17 RX ORDER — CHOLECALCIFEROL (VITAMIN D3) 50 MCG
1 TABLET ORAL DAILY
COMMUNITY

## 2020-12-17 NOTE — PROGRESS NOTES
"MTM ENCOUNTER  SUBJECTIVE/OBJECTIVE:                           Rhoda Ayala is a 60 year old female called for an initial visit. She was referred to me from Dr. Vasquez.      Reason for visit: Tobacco cessation.    Allergies/ADRs: Reviewed in chart  Tobacco: She reports that she has been smoking cigarettes. She has a 1.50 pack-year smoking history. She has never used smokeless tobacco.Tobacco Cessation Action Plan:   See below  Alcohol: Less than 1 beverage / month  Caffeine: 4-6 cups/day of coffee, occasional diet coke  Activity: She's active (walking, yard work, etc)    Past Medical History: Reviewed in chart    Medication Adherence/Access: Patient takes medications directly from bottles.  Patient takes medications 1 time(s) per day.   Per patient, misses medication 0 times per week.     Smoking Cessation:  Patient acknowledges \"I didn't go into my appointment with Dr. Vasquez saying I wanted to quit smoking.\"  Feels she should quit smoking, but is \"wrapping my head around it.\"  Isn't interested in medication at this time.  How much does patient smoke:  0-0.75 PPD.   Why does patient smoke: Habit  Triggers for smoking include:  Situational stress around her mom (see below)  What is the most patient ever smoked:  0.75 PPD  What is the least patient ever smoked:  None - she had smoked intermittently in the past, but has been smoking more consistently over the last few years after stressful situation caring for her mom who has dementia  Tried quitting in the past: No.    Why does patient want to quit (motivators for quitting): Feels embarrassed about smoking, health reasons  What scares patient about quitting? None - just feels she still has to \"wrap my head around quitting\"   How important is it for patient to quit on a scale of 0 - 10? \"Very important, can't assign a number to it\"  How confident is the patient that they can stop smoking on a scale of 0-10: \"I'm confident if I really try\"    Is patient currently " pregnant: No  History of seizures/bipolar disease: Yes  Occupation: Nursing supevisor     Hypertension: Current medications include amlodipine 5mg daily, hydrochlorothiazide 25mg every other day, and losartan 100mg daily.  Patient does not self-monitor blood pressure.  Patient reports no current medication side effects.  Hydrochlorothiazide was reduced to every other day administration at last primary care physician visit due to hypotension.  BP Readings from Last 3 Encounters:   12/09/20 97/67   12/18/19 115/80   12/21/18 (!) 146/94      Hyperlipidemia: Current therapy includes atorvastatin 20mg daily.  Patient reports no significant myalgias or other side effects.  The 10-year ASCVD risk score (Kaylee UREÑA Jr., et al., 2013) is: 6.9%    Values used to calculate the score:      Age: 60 years      Sex: Female      Is Non- : No      Diabetic: No      Tobacco smoker: Yes      Systolic Blood Pressure: 97 mmHg      Is BP treated: Yes      HDL Cholesterol: 34 mg/dL      Total Cholesterol: 154 mg/dL  Recent Labs   Lab Test 12/09/20  0925 12/18/19  0951 06/16/14  0841 06/16/14  0841 07/03/13  1136   CHOL 154 161   < > 211* 241*   HDL 34* 32*   < > 30* 40*   LDL 65 Cannot estimate LDL when triglyceride exceeds 400 mg/dL  76   < > 128 156*   TRIG 276* 492*   < > 262* 224*   CHOLHDLRATIO  --   --   --  7.0* 6.0*    < > = values in this interval not displayed.     Asthma: Current medications: albuterol MDI/nebs as needed (no recent use, usually only needs to use when she has a URI) and Pulmicort Flexhaler 180mcg/act 2 puffs twice daily (uses during fall and winter months only, recently has been using a few times a week).  Triggers include: dry, cold air, URI.  Patient reports the following symptoms: none.  Asthma Action Plan on file: YES  ACT Total Scores 12/5/2018 12/18/2019 12/9/2020   ACT TOTAL SCORE - - -   ASTHMA ER VISITS - - -   ASTHMA HOSPITALIZATIONS - - -   ACT TOTAL SCORE (Goal Greater than or  Equal to 20) 17 25 22   In the past 12 months, how many times did you visit the emergency room for your asthma without being admitted to the hospital? 0 0 0   In the past 12 months, how many times were you hospitalized overnight because of your asthma? 0 0 0     Hypothyroidism: Patient is taking levothyroxine 175 mcg daily. Patient is having the following symptoms: none.  She works with an endocrinologist.  TSH   Date Value Ref Range Status   11/21/2019 1.25 0.30 - 5.00 ulu/ml Final      Allergies:  Rhoda takes cetirizine 10mg daily and Mucinex as needed.  She reports this is effective.  No side effects noted.      Pain/Migraine: She takes ibuprofen 400-600mg daily as needed and cyclobenzaprine 5mg three times daily as needed.  She reports use of both is rare - usually needs them if she shovels a large amount of snow, etc.  She reports migraines have improved significantly over the last few years - she might get 1x/month now.  She has Imitrex available for use, which she does use and finds effective (often needing to take 2 doses).  No side effects noted.    Supplements:  She drinks at least 1 glass of milk per day, also eats at least 1 yogurt per day, some other dairy intermittently throughout the week.  She does take Vitamin D 2000 international unit(s) daily.  Vitamin D Deficiency Screening Results:  No results found for: VITDT     Today's Vitals: There were no vitals taken for this visit.    ASSESSMENT:                            Medication Adherence: No issues identified    Smoking cessation: Patient is not ready to quit using tobacco completely, but does wish to start with cutting back.  We discussed: ways to cope with cravings and manage triggers and techniques for cutting back.    Hypertension: Plan in place.     Hyperlipidemia: Stable.     Asthma: Would benefit from taking Pulmicort more consistently during flare periods.    Hypothyroidism: Stable. Last TSH is within normal limits.      Allergies:   Stable.    Pain/Migraine:  Stable.    Supplements:  Stable.  It sounds like she's generally getting sufficient amounts of calcium through dietary intake.  No changes needed.    PLAN:                            1.  We talked about techniques to aid in cutting back cigarette use with the eventual goal of quitting completely.  Examples included rating from 1-10 how badly she wants each cigarette and putting it back if <5, making smoking more inconvenient (placing additional limits, etc) and putting allocated # of cigarettes per day in a plastic bag  2.  Recommended using Pulmicort more consistently during flare periods - at least a few weeks at a time.    I spent 37 minutes with this patient today. A copy of the visit note was provided to the patient's primary care provider.    Will follow up via [employer] in about 1 month.    The patient declined a summary of these recommendations.     Nancy Melgar, PharmD, [facility]  Medication Therapy Management Provider  Pager: [phone]     Patient consented to a telehealth visit: yes  Telemedicine Visit Details  Type of service:  Telephone visit  Start Time: 11:03 AM  End Time: 11:40 AM  Originating Location (patient location): Home  Distant Location (provider location):  [facility]  Mode of Communication:  Telephone      Medication Therapy Recommendations  Extrinsic asthma    Current Medication: budesonide (PULMICORT FLEXHALER) 180 MCG/ACT inhaler   Rationale: Frequency inappropriate - Dosage too low - Effectiveness   Recommendation: Increase Frequency - Recommended using Pulmicort more frequently during flare seasons   Status: Patient Agreed - Adherence/Education

## 2021-02-01 DIAGNOSIS — J45.31 EXTRINSIC ASTHMA, MILD PERSISTENT, WITH ACUTE EXACERBATION: ICD-10-CM

## 2021-02-01 RX ORDER — ALBUTEROL SULFATE 90 UG/1
AEROSOL, METERED RESPIRATORY (INHALATION)
Qty: 8.5 G | Refills: 1 | Status: SHIPPED | OUTPATIENT
Start: 2021-02-01 | End: 2021-03-22

## 2021-03-15 ENCOUNTER — VIRTUAL VISIT (OUTPATIENT)
Dept: PHARMACY | Facility: CLINIC | Age: 61
End: 2021-03-15
Payer: COMMERCIAL

## 2021-03-15 DIAGNOSIS — Z23 NEED FOR VACCINATION: ICD-10-CM

## 2021-03-15 DIAGNOSIS — J30.2 SEASONAL ALLERGIC RHINITIS, UNSPECIFIED TRIGGER: ICD-10-CM

## 2021-03-15 DIAGNOSIS — Z72.0 TOBACCO ABUSE: Primary | ICD-10-CM

## 2021-03-15 DIAGNOSIS — E78.5 HYPERLIPIDEMIA LDL GOAL <130: ICD-10-CM

## 2021-03-15 DIAGNOSIS — J45.21 MILD INTERMITTENT EXTRINSIC ASTHMA WITH ACUTE EXACERBATION: ICD-10-CM

## 2021-03-15 DIAGNOSIS — R52 INTERMITTENT PAIN: ICD-10-CM

## 2021-03-15 DIAGNOSIS — Z78.9 TAKES DIETARY SUPPLEMENTS: ICD-10-CM

## 2021-03-15 DIAGNOSIS — G43.009 MIGRAINE WITHOUT AURA AND WITHOUT STATUS MIGRAINOSUS, NOT INTRACTABLE: ICD-10-CM

## 2021-03-15 DIAGNOSIS — G47.00 INSOMNIA, UNSPECIFIED TYPE: ICD-10-CM

## 2021-03-15 DIAGNOSIS — E03.9 HYPOTHYROIDISM, UNSPECIFIED TYPE: ICD-10-CM

## 2021-03-15 DIAGNOSIS — I10 ESSENTIAL HYPERTENSION: ICD-10-CM

## 2021-03-15 PROCEDURE — 99607 MTMS BY PHARM ADDL 15 MIN: CPT | Mod: TEL | Performed by: PHARMACIST

## 2021-03-15 PROCEDURE — 99605 MTMS BY PHARM NP 15 MIN: CPT | Mod: TEL | Performed by: PHARMACIST

## 2021-03-15 RX ORDER — PRAVASTATIN SODIUM 40 MG
40 TABLET ORAL DAILY
Qty: 90 TABLET | Refills: 3 | Status: SHIPPED | OUTPATIENT
Start: 2021-03-15 | End: 2021-05-21 | Stop reason: SINTOL

## 2021-03-15 NOTE — PATIENT INSTRUCTIONS
Recommendations from today's MTM visit:                                                    MTM (medication therapy management) is a service provided by a clinical pharmacist designed to help you get the most of out of your medicines.   Today we reviewed what your medicines are for, how to know if they are working, that your medicines are safe and how to make your medicine regimen as easy as possible.      1.  I sent a prescription to your pharmacy for pravastatin 40mg daily to replace atorvastatin.  This will be less likely to cause muscle aches and cramps.    2.  Please talk with your pharmacy about receiving the Shingrix vaccines - this is a 2 dose series to help prevent shingles    It was great to speak with you today.  I value your experience and would be very thankful for your time with providing feedback on our clinic survey. You may receive a survey via email or text message in the next few days.     Next MTM visit: I'll check in via [id number] in 1 month, let me know if you need anything sooner    To schedule another MTM appointment, please call the clinic directly or you may call the MTM scheduling line at [phone] or toll-free at [phone].     My Clinical Pharmacist's contact information:                                                      It was a pleasure talking with you today!  Please feel free to contact me with any questions or concerns you have.      Nancy Melgar PharmD, [facility]  Medication Therapy Management Provider  Pager: [phone]     Yelena Zepeda PharmD  Medication Therapy Management Resident  Pager: [phone]

## 2021-03-15 NOTE — PROGRESS NOTES
"Medication Therapy Management (MTM) Encounter    ASSESSMENT:                            Medication Adherence/Access: No issues identified    Smoking Cessation: She's making good progress and prefers to continue working on cutting back vs stopping abruptly.    Hypertension: Stable.     Hyperlipidemia: Patient is not on moderate intensity statin which is indicated based on 2019 ACC/AHA guidelines for lipid management.       Asthma: Stable.  Up to date and at goal of ACT > or equal to 20.     Hypothyroidism: Stable. Last TSH is within normal limits.       Allergies:  Stable.    Pain/Migraine:  Stable.    Insomnia:  Stable.    Supplements:  Stable.    Immunizations:  Due for Shingrix.  Is otherwise up to date on vaccinations.    PLAN:                            1.  Stay off atorvastatin and start pravastatin 40mg daily.  2.  Recommended receiving Shingrix.      Follow-up: Via [employer] in about 1 month    SUBJECTIVE/OBJECTIVE:                          Rhoda Ayala is a 60 year old female called for a follow-up visit. She was referred to me from Dr. Vasquez.  Today's visit is a follow-up MTM visit from 12/17/20, serving as an initial visit for 2021.     Reason for visit: Routine f/up.    Tobacco: She reports that she has been smoking cigarettes. She has a 1.50 pack-year smoking history. She has never used smokeless tobacco.Tobacco Cessation Action Plan:   see below  Alcohol: Less than 1 beverage / month    Medication Adherence/Access: no issues reported    Smoking Cessation:  Rhoda has been gradually working on cutting back the # of cigarettes she smokes each day.  She reports this has been feeling pretty natural.  She hasn't experience any adverse effects in cutting back.  She continues to feel that she doesn't wish to pursue medication at this time.  How much does patient smoke:  0-0.5 PPD, reduced from 0-0.75 PPD.   She has been using a technique of \"wait another hour\" when she's having a craving for a cigarette.     "   Hypertension: Current medications include amlodipine 5mg daily, hydrochlorothiazide 25mg every other day, and losartan 100mg daily.  Patient does not self-monitor blood pressure.  Patient reports no current medication side effects.  Hydrochlorothiazide was reduced to every other day administration at last primary care physician visit due to hypotension.  She's pleased that she's been losing weight - she's trying to eat a Mediterranean diet.  BP Readings from Last 3 Encounters:   12/09/20 97/67   12/18/19 115/80   12/21/18 (!) 146/94       Hyperlipidemia: Current therapy includes none - she stopped atorvastatin in February due to leg cramps.  She found symptoms improved when she was off for about 5 days, then returned when she resumed.  Patient reports no significant myalgias or other side effects.  The 10-year ASCVD risk score (Kaylee UREÑA Jr., et al., 2013) is: 6.9%    Values used to calculate the score:      Age: 60 years      Sex: Female      Is Non- : No      Diabetic: No      Tobacco smoker: Yes      Systolic Blood Pressure: 97 mmHg      Is BP treated: Yes      HDL Cholesterol: 34 mg/dL      Total Cholesterol: 154 mg/dL     Recent Labs   Lab Test 12/09/20  0925 12/18/19  0951 06/16/14  0841 06/16/14  0841 07/03/13  1136   CHOL 154 161   < > 211* 241*   HDL 34* 32*   < > 30* 40*   LDL 65 Cannot estimate LDL when triglyceride exceeds 400 mg/dL  76   < > 128 156*   TRIG 276* 492*   < > 262* 224*   CHOLHDLRATIO  --   --   --  7.0* 6.0*    < > = values in this interval not displayed.      Asthma: Current medications: albuterol MDI/nebs as needed (no recent use, usually only needs to use when she has a URI) and Pulmicort Flexhaler 180mcg/act 2 puffs twice daily (uses during fall and winter months only, recently has not been using).   She feels breathing has improved with reduced tobacco use.  Triggers include: dry, cold air, URI.  Patient reports the following symptoms: none.  Asthma Action  Plan on file: YES  ACT Total Scores 12/5/2018 12/18/2019 12/9/2020   ACT TOTAL SCORE - - -   ASTHMA ER VISITS - - -   ASTHMA HOSPITALIZATIONS - - -   ACT TOTAL SCORE (Goal Greater than or Equal to 20) 17 25 22   In the past 12 months, how many times did you visit the emergency room for your asthma without being admitted to the hospital? 0 0 0   In the past 12 months, how many times were you hospitalized overnight because of your asthma? 0 0 0      Hypothyroidism: Patient is taking levothyroxine 175 mcg daily. Patient is having the following symptoms: none.  She works with an endocrinologist.  TSH   Date Value Ref Range Status   11/21/2019 1.25 0.30 - 5.00 ulu/ml Final       Allergies:  Rhoda takes cetirizine 10mg daily as needed (not currently using) and Mucinex as needed.  She reports this is effective.  No side effects noted.      Pain/Migraine: She takes ibuprofen 400-600mg daily as needed and cyclobenzaprine 5mg three times daily as needed.  She reports use of both is rare - usually needs them if she shovels a large amount of snow, etc.  She reports migraines have improved significantly over the last few years - she might get 1x/month now.  She has Imitrex available for use, which she does use and finds effective (often needing to take 2 doses).  No side effects noted.    Insomnia:  Rhoda has been taking melatonin 5mg at bedtime as needed, which she reports is effective.  She primarily uses this when she's working weird shifts, etc.      Supplements:  She drinks at least 1 glass of milk per day, also eats at least 1 yogurt per day, some other dairy intermittently throughout the week.  She does take Vitamin D 2000 international unit(s) daily.    Immunizations:  She did receive both COVID-19 vaccine doses through work.  She did receive annual influenza vaccine, Pneumovax, Tdap.  She did receive Zostavax, but has not received Shingrix.    Today's Vitals: There were no vitals taken for this  visit.  ----------------     I spent 27 minutes with this patient today. All changes were made via collaborative practice agreement with Dr. Vasquez. A copy of the visit note was provided to the patient's primary care provider.    The patient was sent via [employer] a summary of these recommendations.     Violeta DaviesD, BCACP  Medication Therapy Management Provider  Pager: [phone]     Telemedicine Visit Details  Type of service:  Telephone visit  Start Time: 3:05 PM  End Time: 3:32 PM  Originating Location (patient location): [place]  Distant Location (provider location):  [facility]      Medication Therapy Recommendations  Hyperlipidemia LDL goal <130    Rationale: Undesirable effect - Adverse medication event - Safety   Recommendation: Change Medication - pravastatin 40 MG tablet - Changed from atorvastatin to pravastatin due to myalgias   Status: Accepted per CPA         Need for vaccination    Rationale: Preventive therapy - Needs additional medication therapy - Indication   Recommendation: Order Vaccine - Shingrix 50 MCG/0.5ML Susr - Recommended 2-dose Shingrix vaccine   Status: Accepted - no CPA Needed

## 2021-03-22 DIAGNOSIS — J45.31 EXTRINSIC ASTHMA, MILD PERSISTENT, WITH ACUTE EXACERBATION: ICD-10-CM

## 2021-03-22 RX ORDER — ALBUTEROL SULFATE 90 UG/1
AEROSOL, METERED RESPIRATORY (INHALATION)
Qty: 8.5 G | Refills: 1 | Status: ON HOLD | OUTPATIENT
Start: 2021-03-22 | End: 2023-10-12

## 2021-03-27 ENCOUNTER — HEALTH MAINTENANCE LETTER (OUTPATIENT)
Age: 61
End: 2021-03-27

## 2021-04-25 ENCOUNTER — TRANSFERRED RECORDS (OUTPATIENT)
Dept: HEALTH INFORMATION MANAGEMENT | Facility: CLINIC | Age: 61
End: 2021-04-25

## 2021-05-21 ENCOUNTER — TELEPHONE (OUTPATIENT)
Dept: PHARMACY | Facility: CLINIC | Age: 61
End: 2021-05-21

## 2021-05-21 NOTE — TELEPHONE ENCOUNTER
Received phone call from Rhoda - she took pravastatin for about 4 weeks and developed swelling in her knee and muscle cramping.  She was seen at [facility] and had a cortisone injection in her left knee and also had some fluid removed.  She held her statin for about a week, symptoms improved, and recurred when she resumed therapy.  She's been off therapy again for about a week and symptoms are improving.      I recommended she stay off statin therapy at this time.  Will consider Zetia, but will wait for a few weeks to ensure symptoms resolve.  I will plan to reach out to her at that time.    Nancy Melgar, PharmD, [facility]  Medication Therapy Management Provider  Pager: [phone]

## 2021-06-15 ENCOUNTER — TRANSFERRED RECORDS (OUTPATIENT)
Dept: HEALTH INFORMATION MANAGEMENT | Facility: CLINIC | Age: 61
End: 2021-06-15

## 2021-07-21 ENCOUNTER — TELEPHONE (OUTPATIENT)
Dept: PHARMACY | Facility: CLINIC | Age: 61
End: 2021-07-21

## 2021-07-21 NOTE — TELEPHONE ENCOUNTER
Received VM from Rhoda in response to the last [employer] messages I sent to her.  I tried calling her back - [facility] for her to return my call.    Nancy Melgar, PharmD, [facility]  Medication Therapy Management Provider  Pager: [phone]

## 2021-09-05 ENCOUNTER — HEALTH MAINTENANCE LETTER (OUTPATIENT)
Age: 61
End: 2021-09-05

## 2021-10-12 ENCOUNTER — TELEPHONE (OUTPATIENT)
Dept: PHARMACY | Facility: CLINIC | Age: 61
End: 2021-10-12

## 2021-10-12 NOTE — TELEPHONE ENCOUNTER
We have been unable to reach this patient for MTM follow-up after several attempts. We will stop reaching out to the patient at this time. Please let us know if we can assist in this patient's care in the future.    Routing to PCP as Violeta XavierD, [facility]  Medication Therapy Management Provider  Pager: [phone]

## 2021-11-10 ENCOUNTER — TRANSFERRED RECORDS (OUTPATIENT)
Dept: HEALTH INFORMATION MANAGEMENT | Facility: CLINIC | Age: 61
End: 2021-11-10
Payer: COMMERCIAL

## 2021-12-17 DIAGNOSIS — I10 BENIGN ESSENTIAL HYPERTENSION: ICD-10-CM

## 2021-12-20 RX ORDER — LOSARTAN POTASSIUM 100 MG/1
TABLET ORAL
Qty: 90 TABLET | Refills: 3 | Status: SHIPPED | OUTPATIENT
Start: 2021-12-20 | End: 2022-02-23

## 2022-01-28 DIAGNOSIS — I10 BENIGN ESSENTIAL HYPERTENSION: ICD-10-CM

## 2022-01-28 DIAGNOSIS — M54.9 UPPER BACK PAIN ON RIGHT SIDE: ICD-10-CM

## 2022-01-28 DIAGNOSIS — M79.602 ARM PAIN, LEFT: ICD-10-CM

## 2022-01-28 RX ORDER — HYDROCHLOROTHIAZIDE 25 MG/1
TABLET ORAL
Qty: 90 TABLET | Refills: 0 | Status: SHIPPED | OUTPATIENT
Start: 2022-01-28 | End: 2022-02-23

## 2022-01-28 NOTE — TELEPHONE ENCOUNTER
Routing refill request to provider for review/approval because:  Drug not on the [facility] refill protocol. FYI patient scheduled for visit 2/23/2022. Please advise if patricia refill is appropriate.  Juanita Carmona RN

## 2022-01-30 RX ORDER — CYCLOBENZAPRINE HCL 5 MG
TABLET ORAL
Qty: 42 TABLET | Refills: 11 | Status: SHIPPED | OUTPATIENT
Start: 2022-01-30 | End: 2023-01-23

## 2022-02-23 ENCOUNTER — OFFICE VISIT (OUTPATIENT)
Dept: FAMILY MEDICINE | Facility: CLINIC | Age: 62
End: 2022-02-23
Payer: COMMERCIAL

## 2022-02-23 ENCOUNTER — TELEPHONE (OUTPATIENT)
Dept: FAMILY MEDICINE | Facility: CLINIC | Age: 62
End: 2022-02-23

## 2022-02-23 VITALS
HEART RATE: 65 BPM | SYSTOLIC BLOOD PRESSURE: 126 MMHG | WEIGHT: 199 LBS | BODY MASS INDEX: 31.23 KG/M2 | OXYGEN SATURATION: 99 % | DIASTOLIC BLOOD PRESSURE: 82 MMHG | RESPIRATION RATE: 18 BRPM | HEIGHT: 67 IN | TEMPERATURE: 97.1 F

## 2022-02-23 DIAGNOSIS — Z00.00 ROUTINE GENERAL MEDICAL EXAMINATION AT A HEALTH CARE FACILITY: Primary | ICD-10-CM

## 2022-02-23 DIAGNOSIS — I10 BENIGN ESSENTIAL HYPERTENSION: ICD-10-CM

## 2022-02-23 DIAGNOSIS — E83.52 HYPERCALCEMIA: Primary | ICD-10-CM

## 2022-02-23 DIAGNOSIS — E78.5 HYPERLIPIDEMIA LDL GOAL <130: ICD-10-CM

## 2022-02-23 DIAGNOSIS — R73.01 IFG (IMPAIRED FASTING GLUCOSE): ICD-10-CM

## 2022-02-23 DIAGNOSIS — E03.9 HYPOTHYROIDISM, UNSPECIFIED TYPE: ICD-10-CM

## 2022-02-23 DIAGNOSIS — G43.009 MIGRAINE WITHOUT AURA AND WITHOUT STATUS MIGRAINOSUS, NOT INTRACTABLE: ICD-10-CM

## 2022-02-23 LAB
ALBUMIN SERPL-MCNC: 3.8 G/DL (ref 3.4–5)
ALP SERPL-CCNC: 107 U/L (ref 40–150)
ALT SERPL W P-5'-P-CCNC: 35 U/L (ref 0–50)
ANION GAP SERPL CALCULATED.3IONS-SCNC: 9 MMOL/L (ref 3–14)
AST SERPL W P-5'-P-CCNC: 15 U/L (ref 0–45)
BILIRUB SERPL-MCNC: 0.3 MG/DL (ref 0.2–1.3)
BUN SERPL-MCNC: 13 MG/DL (ref 7–30)
CALCIUM SERPL-MCNC: 10.2 MG/DL (ref 8.5–10.1)
CHLORIDE BLD-SCNC: 107 MMOL/L (ref 94–109)
CHOLEST SERPL-MCNC: 204 MG/DL
CO2 SERPL-SCNC: 23 MMOL/L (ref 20–32)
CREAT SERPL-MCNC: 0.86 MG/DL (ref 0.52–1.04)
ERYTHROCYTE [DISTWIDTH] IN BLOOD BY AUTOMATED COUNT: 12.9 % (ref 10–15)
FASTING STATUS PATIENT QL REPORTED: YES
GFR SERPL CREATININE-BSD FRML MDRD: 75 ML/MIN/1.73M2
GLUCOSE BLD-MCNC: 108 MG/DL (ref 70–99)
HBA1C MFR BLD: 5.6 % (ref 0–5.6)
HCT VFR BLD AUTO: 41.9 % (ref 35–47)
HDLC SERPL-MCNC: 37 MG/DL
HGB BLD-MCNC: 14 G/DL (ref 11.7–15.7)
LDLC SERPL CALC-MCNC: 121 MG/DL
MCH RBC QN AUTO: 30.6 PG (ref 26.5–33)
MCHC RBC AUTO-ENTMCNC: 33.4 G/DL (ref 31.5–36.5)
MCV RBC AUTO: 92 FL (ref 78–100)
NONHDLC SERPL-MCNC: 167 MG/DL
PLATELET # BLD AUTO: 338 10E3/UL (ref 150–450)
POTASSIUM BLD-SCNC: 4.2 MMOL/L (ref 3.4–5.3)
PROT SERPL-MCNC: 7.4 G/DL (ref 6.8–8.8)
RBC # BLD AUTO: 4.57 10E6/UL (ref 3.8–5.2)
SODIUM SERPL-SCNC: 139 MMOL/L (ref 133–144)
TRIGL SERPL-MCNC: 232 MG/DL
TSH SERPL DL<=0.005 MIU/L-ACNC: 0.47 MU/L (ref 0.4–4)
WBC # BLD AUTO: 10 10E3/UL (ref 4–11)

## 2022-02-23 PROCEDURE — 36415 COLL VENOUS BLD VENIPUNCTURE: CPT | Performed by: INTERNAL MEDICINE

## 2022-02-23 PROCEDURE — 83036 HEMOGLOBIN GLYCOSYLATED A1C: CPT | Performed by: INTERNAL MEDICINE

## 2022-02-23 PROCEDURE — 99214 OFFICE O/P EST MOD 30 MIN: CPT | Mod: 25 | Performed by: INTERNAL MEDICINE

## 2022-02-23 PROCEDURE — 99396 PREV VISIT EST AGE 40-64: CPT | Performed by: INTERNAL MEDICINE

## 2022-02-23 PROCEDURE — 80053 COMPREHEN METABOLIC PANEL: CPT | Performed by: INTERNAL MEDICINE

## 2022-02-23 PROCEDURE — 80061 LIPID PANEL: CPT | Performed by: INTERNAL MEDICINE

## 2022-02-23 PROCEDURE — 85027 COMPLETE CBC AUTOMATED: CPT | Performed by: INTERNAL MEDICINE

## 2022-02-23 PROCEDURE — 84443 ASSAY THYROID STIM HORMONE: CPT | Performed by: INTERNAL MEDICINE

## 2022-02-23 RX ORDER — HYDROCHLOROTHIAZIDE 25 MG/1
25 TABLET ORAL
Qty: 90 TABLET | Refills: 3 | Status: SHIPPED | OUTPATIENT
Start: 2022-02-23 | End: 2023-01-17

## 2022-02-23 RX ORDER — LOSARTAN POTASSIUM 100 MG/1
100 TABLET ORAL DAILY
Qty: 90 TABLET | Refills: 3 | Status: SHIPPED | OUTPATIENT
Start: 2022-02-23 | End: 2022-11-22

## 2022-02-23 RX ORDER — HYDROCHLOROTHIAZIDE 25 MG/1
25 TABLET ORAL DAILY
Qty: 90 TABLET | Refills: 3 | Status: SHIPPED | OUTPATIENT
Start: 2022-02-23 | End: 2022-02-23

## 2022-02-23 RX ORDER — SUMATRIPTAN 100 MG/1
TABLET, FILM COATED ORAL
Qty: 9 TABLET | Refills: 11 | Status: SHIPPED | OUTPATIENT
Start: 2022-02-23 | End: 2023-03-20

## 2022-02-23 ASSESSMENT — PAIN SCALES - GENERAL: PAINLEVEL: NO PAIN (0)

## 2022-02-23 ASSESSMENT — ASTHMA QUESTIONNAIRES: ACT_TOTALSCORE: 24

## 2022-02-23 NOTE — PATIENT INSTRUCTIONS
(I10) Benign essential hypertension  (primary encounter diagnosis)  Comment: Your blood pressure is excellent.  We will continue with hydrochlorothiazide and recheck labs today  Plan: hydrochlorothiazide (HYDRODIURIL) 25 MG tablet,        losartan (COZAAR) 100 MG tablet, CBC with         platelets            (G43.009) Migraine without aura and without status migrainosus, not intractable  Comment: Continue on sumatriptan as needed for migraine  Plan: SUMAtriptan (IMITREX) 100 MG tablet            (R73.01) IFG (impaired fasting glucose)  Comment: Recheck A1c continue healthy diet  Plan: Comprehensive metabolic panel, Hemoglobin A1c            (E78.5) Hyperlipidemia LDL goal <130  Comment: Recheck cholesterol consider statin pending results  Plan: Lipid panel reflex to direct LDL Fasting            (E03.9) Hypothyroidism, unspecified type  Comment: Continue levothyroxine recheck thyroid function  Plan: TSH WITH FREE T4 REFLEX    Smoking  Comment; Recommend smoking cessation           Shingrix vaccine is now available.  I would call your insurance to see if a shingles vaccine is covered and get this at your pharmacy      For routine exam, we will draw labs as ordered, cholesterol, diabetes mellitus check, liver function, renal function,   We will also update vaccination history.

## 2022-02-23 NOTE — Clinical Note
Please abstract the following data from this visit with this patient into the appropriate field in Epic:  Tests that can be patient reported without a hard copy:  Mammogram done on this date: 5/10/22 (approximately), by this group: [facility], results were negative.   Other Tests found in the patient's chart through Chart Review/Care Everywhere:  {Abstract Quality List (Optional):241320}  Note to Abstraction: If this section is blank, no results were found via Chart Review/Care Everywhere.

## 2022-02-23 NOTE — RESULT ENCOUNTER NOTE
Onesimo Duong,    I had the opportunity to review your recent labs and a summary of your labs reads as follows:    Your complete blood counts show no sign of anemia, normal white blood cell count and platelets.  Your comprehensive metabolic panel showed normal renal function, normal liver function, and stable elevated fasting blood glucose indicating no evidence of diabetes mellitus.  Your calcium is a bit high and we should recheck this and vitamin D when you are able  Your fasting lipid panel show  - low  HDL (good) cholesterol -as your goal is greater than 40  - low LDL (bad) cholesterol as your goal is less than 130  - elevated triglyceride levels  Your thyroid function is stable on your current dose of levothyroxine     Sincerely,  Fili Vasquez MD

## 2022-02-27 ENCOUNTER — LAB REQUISITION (OUTPATIENT)
Dept: LAB | Facility: CLINIC | Age: 62
End: 2022-02-27

## 2022-02-27 PROCEDURE — U0005 INFEC AGEN DETEC AMPLI PROBE: HCPCS | Performed by: INTERNAL MEDICINE

## 2022-02-28 LAB — SARS-COV-2 RNA RESP QL NAA+PROBE: NEGATIVE

## 2022-04-20 DIAGNOSIS — I10 BENIGN ESSENTIAL HYPERTENSION: ICD-10-CM

## 2022-04-21 RX ORDER — AMLODIPINE BESYLATE 5 MG/1
TABLET ORAL
Qty: 90 TABLET | Refills: 2 | Status: SHIPPED | OUTPATIENT
Start: 2022-04-21 | End: 2023-10-10

## 2022-04-21 NOTE — TELEPHONE ENCOUNTER
Prescription approved per [facility] Refill Protocol.    Irma NORMAN  [facility]

## 2022-05-10 ENCOUNTER — TRANSFERRED RECORDS (OUTPATIENT)
Dept: HEALTH INFORMATION MANAGEMENT | Facility: CLINIC | Age: 62
End: 2022-05-10
Payer: COMMERCIAL

## 2022-05-17 ENCOUNTER — TRANSFERRED RECORDS (OUTPATIENT)
Dept: HEALTH INFORMATION MANAGEMENT | Facility: CLINIC | Age: 62
End: 2022-05-17

## 2022-05-17 PROCEDURE — 88112 CYTOPATH CELL ENHANCE TECH: CPT | Mod: 26 | Performed by: PATHOLOGY

## 2022-05-17 PROCEDURE — 88112 CYTOPATH CELL ENHANCE TECH: CPT | Mod: TC,ORL | Performed by: PHYSICIAN ASSISTANT

## 2022-05-18 ENCOUNTER — LAB REQUISITION (OUTPATIENT)
Dept: LAB | Facility: CLINIC | Age: 62
End: 2022-05-18
Payer: COMMERCIAL

## 2022-05-18 DIAGNOSIS — Z87.42 PERSONAL HISTORY OF OTHER DISEASES OF THE FEMALE GENITAL TRACT: ICD-10-CM

## 2022-05-20 LAB
PATH REPORT.COMMENTS IMP SPEC: ABNORMAL
PATH REPORT.COMMENTS IMP SPEC: YES
PATH REPORT.FINAL DX SPEC: ABNORMAL
PATH REPORT.GROSS SPEC: ABNORMAL
PATH REPORT.RELEVANT HX SPEC: ABNORMAL

## 2022-08-16 ENCOUNTER — TRANSFERRED RECORDS (OUTPATIENT)
Dept: HEALTH INFORMATION MANAGEMENT | Facility: CLINIC | Age: 62
End: 2022-08-16

## 2022-08-17 ENCOUNTER — TELEPHONE (OUTPATIENT)
Dept: FAMILY MEDICINE | Facility: CLINIC | Age: 62
End: 2022-08-17

## 2022-08-17 NOTE — TELEPHONE ENCOUNTER
Patient Quality Outreach    Patient is due for the following:   Asthma  -  ACT needed  Cervical Cancer Screening - PAP Needed    Next Steps:   No follow up needed at this time.    Type of outreach:    Sent [employer] message.      Questions for provider review:         Ignacia Fry, CMA

## 2022-09-21 ENCOUNTER — OFFICE VISIT (OUTPATIENT)
Dept: FAMILY MEDICINE | Facility: CLINIC | Age: 62
End: 2022-09-21
Payer: COMMERCIAL

## 2022-09-21 VITALS
DIASTOLIC BLOOD PRESSURE: 77 MMHG | HEART RATE: 67 BPM | TEMPERATURE: 98 F | HEIGHT: 67 IN | WEIGHT: 202 LBS | RESPIRATION RATE: 16 BRPM | SYSTOLIC BLOOD PRESSURE: 117 MMHG | BODY MASS INDEX: 31.71 KG/M2 | OXYGEN SATURATION: 96 %

## 2022-09-21 DIAGNOSIS — R39.89 URINARY PROBLEM: Primary | ICD-10-CM

## 2022-09-21 DIAGNOSIS — M79.641 PAIN OF RIGHT HAND: ICD-10-CM

## 2022-09-21 LAB
ALBUMIN UR-MCNC: NEGATIVE MG/DL
APPEARANCE UR: CLEAR
BACTERIA #/AREA URNS HPF: ABNORMAL /HPF
BILIRUB UR QL STRIP: NEGATIVE
COLOR UR AUTO: YELLOW
GLUCOSE UR STRIP-MCNC: NEGATIVE MG/DL
HGB UR QL STRIP: NEGATIVE
KETONES UR STRIP-MCNC: NEGATIVE MG/DL
LEUKOCYTE ESTERASE UR QL STRIP: ABNORMAL
NITRATE UR QL: NEGATIVE
PH UR STRIP: 6 [PH] (ref 5–7)
RBC #/AREA URNS AUTO: ABNORMAL /HPF
SP GR UR STRIP: 1.01 (ref 1–1.03)
UROBILINOGEN UR STRIP-ACNC: 0.2 E.U./DL
WBC #/AREA URNS AUTO: ABNORMAL /HPF

## 2022-09-21 PROCEDURE — 87086 URINE CULTURE/COLONY COUNT: CPT | Performed by: NURSE PRACTITIONER

## 2022-09-21 PROCEDURE — 81001 URINALYSIS AUTO W/SCOPE: CPT | Performed by: NURSE PRACTITIONER

## 2022-09-21 PROCEDURE — 99213 OFFICE O/P EST LOW 20 MIN: CPT | Performed by: NURSE PRACTITIONER

## 2022-09-21 ASSESSMENT — ASTHMA QUESTIONNAIRES
QUESTION_2 LAST FOUR WEEKS HOW OFTEN HAVE YOU HAD SHORTNESS OF BREATH: NOT AT ALL
QUESTION_1 LAST FOUR WEEKS HOW MUCH OF THE TIME DID YOUR ASTHMA KEEP YOU FROM GETTING AS MUCH DONE AT WORK, SCHOOL OR AT HOME: NONE OF THE TIME
ACT_TOTALSCORE: 23
QUESTION_3 LAST FOUR WEEKS HOW OFTEN DID YOUR ASTHMA SYMPTOMS (WHEEZING, COUGHING, SHORTNESS OF BREATH, CHEST TIGHTNESS OR PAIN) WAKE YOU UP AT NIGHT OR EARLIER THAN USUAL IN THE MORNING: NOT AT ALL
ACT_TOTALSCORE: 23
QUESTION_5 LAST FOUR WEEKS HOW WOULD YOU RATE YOUR ASTHMA CONTROL: WELL CONTROLLED
QUESTION_4 LAST FOUR WEEKS HOW OFTEN HAVE YOU USED YOUR RESCUE INHALER OR NEBULIZER MEDICATION (SUCH AS ALBUTEROL): ONCE A WEEK OR LESS

## 2022-09-21 ASSESSMENT — PAIN SCALES - GENERAL: PAINLEVEL: MODERATE PAIN (4)

## 2022-09-21 NOTE — PROGRESS NOTES
Assessment & Plan   Problem List Items Addressed This Visit    None     Visit Diagnoses     Urinary problem    -  Primary    Relevant Orders    UA macro with reflex to Microscopic and Culture - Clinc Collect (Completed)    Urine Microscopic (Completed)    Urine Culture    Pain of right hand        Relevant Orders    Orthopedic  Referral         UA border line and UC pending- will treat with antibiotics if cultures comes back positive, currently has no burning sensation. S/s could be r/t atrophic vaginitis so could consider vaginal estrogen cream in the future   Swelling of the hand most likely due to injury as there is a tiny bruise. Instructed patient to follow up with Ortho for further evaluation and treatment if pain persist.       MAURICIO Erickson CNP  [facility] BROWN Duong is a 61 year old, presenting for the following health issues:  No chief complaint on file.      History of Present Illness       Reason for visit:  Possible infection right hand and possible UTI  Symptom onset:  1-3 days ago  Symptoms include:  Swelling and pain right hand  Symptom intensity:  Moderate  Symptom progression:  Staying the same  Had these symptoms before:  Yes  Has tried/received treatment for these symptoms:  No  What makes it better:  Triple  antibiotic ointment applied externally to top of righg hand    She eats 4 or more servings of fruits and vegetables daily.She consumes 0 sweetened beverage(s) daily.She exercises with enough effort to increase her heart rate 30 to 60 minutes per day.  She exercises with enough effort to increase her heart rate 3 or less days per week.   She is taking medications regularly.    R hand swelling starting Friday of CMC region. Does not recall any recent trauma   Pain radiating up to the shoulder and worsens with activity  Takes Advil every 6 hrs for pain  Applied bacitracin ointment which helped to reduce the swelling  Hx of hand cellulitis  "15 yrs ago in the [facility] area   Was able to mow lawn, trim hedges and throw grass seed on Monday and noticed it was more sore after       Burning sensation with urination starting 2 days ago  Woke up last night and felt the burning sensation again when emptying bladder  No hematuria, increased frequency or urgency noted  Drinks a lot of water 60-70 ounce of water  No history of UTI       Review of Systems   Detailed as above         Objective    /77 (BP Location: Right arm, Cuff Size: Adult Large)   Pulse 67   Temp 98  F (36.7  C) (Tympanic)   Resp 16   Ht 1.702 m (5' 7\")   Wt 91.6 kg (202 lb)   SpO2 96%   BMI 31.64 kg/m    There is no height or weight on file to calculate BMI.  Physical Exam  Constitutional:       Appearance: Normal appearance.   Musculoskeletal:         General: Swelling and tenderness (minimal) present. Normal range of motion.      Comments: Swelling of R CMC region and thenar eminence    Skin:     Findings: Bruising present.      Comments: R anterior radial    Neurological:      Mental Status: She is alert.            Results for orders placed or performed in visit on 09/21/22 (from the past 24 hour(s))   UA macro with reflex to Microscopic and Culture - Clinc Collect    Specimen: Urine, NOS   Result Value Ref Range    Color Urine Yellow Colorless, Straw, Light Yellow, Yellow    Appearance Urine Clear Clear    Glucose Urine Negative Negative mg/dL    Bilirubin Urine Negative Negative    Ketones Urine Negative Negative mg/dL    Specific Gravity Urine 1.015 1.003 - 1.035    Blood Urine Negative Negative    pH Urine 6.0 5.0 - 7.0    Protein Albumin Urine Negative Negative mg/dL    Urobilinogen Urine 0.2 0.2, 1.0 E.U./dL    Nitrite Urine Negative Negative    Leukocyte Esterase Urine Small (A) Negative   Urine Microscopic   Result Value Ref Range    Bacteria Urine Few (A) None Seen /HPF    RBC Urine 0-2 0-2 /HPF /HPF    WBC Urine 10-25 (A) 0-5 /HPF /HPF                 I saw this " patient in collaboration with Justin Simmons NP Student      I was present with the APRN/PA student who participated in the service and in the documentation of the services provided. I have verified the history and personally performed the physical exam and medical decision making, as documented by the student and edited by me.     Elizabeth Ford, MAURICIO, CNP    Justin Simmons NP Student

## 2022-09-23 LAB — BACTERIA UR CULT: NORMAL

## 2022-09-23 NOTE — RESULT ENCOUNTER NOTE
Onesimo Duong, your culture is negative so you do not have an infection. Let me know if you want to do the vaginal estrogen cream. Otherwise you can discuss it at your next visit too   Elizabeth

## 2022-10-17 ENCOUNTER — TRANSFERRED RECORDS (OUTPATIENT)
Dept: HEALTH INFORMATION MANAGEMENT | Facility: CLINIC | Age: 62
End: 2022-10-17

## 2022-10-29 ENCOUNTER — HEALTH MAINTENANCE LETTER (OUTPATIENT)
Age: 62
End: 2022-10-29

## 2022-11-19 DIAGNOSIS — I10 BENIGN ESSENTIAL HYPERTENSION: ICD-10-CM

## 2022-11-22 RX ORDER — LOSARTAN POTASSIUM 100 MG/1
TABLET ORAL
Qty: 90 TABLET | Refills: 0 | Status: SHIPPED | OUTPATIENT
Start: 2022-11-22 | End: 2023-10-10

## 2022-11-22 NOTE — TELEPHONE ENCOUNTER
Medication is being filled for 1 time refill only due to:  Patient needs labs February 2023. Lakisha Payne RN

## 2023-01-16 DIAGNOSIS — I10 BENIGN ESSENTIAL HYPERTENSION: ICD-10-CM

## 2023-01-17 RX ORDER — HYDROCHLOROTHIAZIDE 25 MG/1
TABLET ORAL
Qty: 90 TABLET | Refills: 2 | Status: SHIPPED | OUTPATIENT
Start: 2023-01-17 | End: 2023-03-20

## 2023-01-21 DIAGNOSIS — M54.9 UPPER BACK PAIN ON RIGHT SIDE: ICD-10-CM

## 2023-01-21 DIAGNOSIS — M79.602 ARM PAIN, LEFT: ICD-10-CM

## 2023-01-23 RX ORDER — CYCLOBENZAPRINE HCL 5 MG
TABLET ORAL
Qty: 42 TABLET | Refills: 11 | Status: SHIPPED | OUTPATIENT
Start: 2023-01-23 | End: 2023-12-26

## 2023-03-20 DIAGNOSIS — G43.009 MIGRAINE WITHOUT AURA AND WITHOUT STATUS MIGRAINOSUS, NOT INTRACTABLE: ICD-10-CM

## 2023-03-20 DIAGNOSIS — I10 BENIGN ESSENTIAL HYPERTENSION: ICD-10-CM

## 2023-03-20 RX ORDER — HYDROCHLOROTHIAZIDE 25 MG/1
TABLET ORAL
Qty: 90 TABLET | Refills: 2 | Status: ON HOLD | OUTPATIENT
Start: 2023-03-20 | End: 2023-10-12

## 2023-03-20 RX ORDER — SUMATRIPTAN 100 MG/1
TABLET, FILM COATED ORAL
Qty: 9 TABLET | Refills: 11 | Status: SHIPPED | OUTPATIENT
Start: 2023-03-20 | End: 2023-12-26

## 2023-04-02 ENCOUNTER — HEALTH MAINTENANCE LETTER (OUTPATIENT)
Age: 63
End: 2023-04-02

## 2023-05-16 ENCOUNTER — TRANSFERRED RECORDS (OUTPATIENT)
Dept: HEALTH INFORMATION MANAGEMENT | Facility: CLINIC | Age: 63
End: 2023-05-16
Payer: COMMERCIAL

## 2023-10-10 ENCOUNTER — APPOINTMENT (OUTPATIENT)
Dept: CT IMAGING | Facility: CLINIC | Age: 63
DRG: 280 | End: 2023-10-10
Attending: EMERGENCY MEDICINE
Payer: COMMERCIAL

## 2023-10-10 ENCOUNTER — APPOINTMENT (OUTPATIENT)
Dept: CARDIOLOGY | Facility: CLINIC | Age: 63
DRG: 280 | End: 2023-10-10
Attending: PHYSICIAN ASSISTANT
Payer: COMMERCIAL

## 2023-10-10 ENCOUNTER — HOSPITAL ENCOUNTER (INPATIENT)
Facility: CLINIC | Age: 63
LOS: 2 days | Discharge: HOME | DRG: 280 | End: 2023-10-12
Attending: EMERGENCY MEDICINE | Admitting: INTERNAL MEDICINE
Payer: COMMERCIAL

## 2023-10-10 DIAGNOSIS — I50.9 ACUTE CONGESTIVE HEART FAILURE, UNSPECIFIED HEART FAILURE TYPE (H): ICD-10-CM

## 2023-10-10 DIAGNOSIS — I20.89 ANGINA OF EFFORT (H): Primary | ICD-10-CM

## 2023-10-10 DIAGNOSIS — R07.9 CHEST PAIN, UNSPECIFIED TYPE: ICD-10-CM

## 2023-10-10 DIAGNOSIS — I47.19 AVNRT (AV NODAL RE-ENTRY TACHYCARDIA) (H): ICD-10-CM

## 2023-10-10 DIAGNOSIS — I42.9 CARDIOMYOPATHY, UNSPECIFIED TYPE (H): ICD-10-CM

## 2023-10-10 DIAGNOSIS — I50.21 ACUTE SYSTOLIC CONGESTIVE HEART FAILURE (H): ICD-10-CM

## 2023-10-10 LAB
ALBUMIN SERPL BCG-MCNC: 4.3 G/DL (ref 3.5–5.2)
ALP SERPL-CCNC: 82 U/L (ref 35–104)
ALT SERPL W P-5'-P-CCNC: 30 U/L (ref 0–50)
ANION GAP SERPL CALCULATED.3IONS-SCNC: 12 MMOL/L (ref 7–15)
AST SERPL W P-5'-P-CCNC: 15 U/L (ref 0–45)
ATRIAL RATE - MUSE: 82 BPM
BASO+EOS+MONOS # BLD AUTO: ABNORMAL 10*3/UL
BASO+EOS+MONOS NFR BLD AUTO: ABNORMAL %
BASOPHILS # BLD AUTO: 0.2 10E3/UL (ref 0–0.2)
BASOPHILS NFR BLD AUTO: 1 %
BILIRUB SERPL-MCNC: 0.3 MG/DL
BUN SERPL-MCNC: 14.4 MG/DL (ref 8–23)
CALCIUM SERPL-MCNC: 9.5 MG/DL (ref 8.8–10.2)
CHLORIDE SERPL-SCNC: 104 MMOL/L (ref 98–107)
CHOLEST SERPL-MCNC: 189 MG/DL
CREAT SERPL-MCNC: 0.88 MG/DL (ref 0.51–0.95)
D DIMER PPP FEU-MCNC: 0.59 UG/ML FEU (ref 0–0.5)
DEPRECATED HCO3 PLAS-SCNC: 25 MMOL/L (ref 22–29)
DIASTOLIC BLOOD PRESSURE - MUSE: NORMAL MMHG
EGFRCR SERPLBLD CKD-EPI 2021: 73 ML/MIN/1.73M2
EOSINOPHIL # BLD AUTO: 0.9 10E3/UL (ref 0–0.7)
EOSINOPHIL NFR BLD AUTO: 8 %
ERYTHROCYTE [DISTWIDTH] IN BLOOD BY AUTOMATED COUNT: 12.7 % (ref 10–15)
GLUCOSE SERPL-MCNC: 109 MG/DL (ref 70–99)
HBA1C MFR BLD: 5.6 %
HCT VFR BLD AUTO: 39.2 % (ref 35–47)
HDLC SERPL-MCNC: 42 MG/DL
HGB BLD-MCNC: 13.2 G/DL (ref 11.7–15.7)
IMM GRANULOCYTES # BLD: 0.1 10E3/UL
IMM GRANULOCYTES NFR BLD: 1 %
INTERPRETATION ECG - MUSE: NORMAL
LDLC SERPL CALC-MCNC: 104 MG/DL
LVEF ECHO: NORMAL
LYMPHOCYTES # BLD AUTO: 2.3 10E3/UL (ref 0.8–5.3)
LYMPHOCYTES NFR BLD AUTO: 20 %
MAGNESIUM SERPL-MCNC: 1.6 MG/DL (ref 1.7–2.3)
MCH RBC QN AUTO: 30.3 PG (ref 26.5–33)
MCHC RBC AUTO-ENTMCNC: 33.7 G/DL (ref 31.5–36.5)
MCV RBC AUTO: 90 FL (ref 78–100)
MONOCYTES # BLD AUTO: 0.7 10E3/UL (ref 0–1.3)
MONOCYTES NFR BLD AUTO: 6 %
NEUTROPHILS # BLD AUTO: 7.4 10E3/UL (ref 1.6–8.3)
NEUTROPHILS NFR BLD AUTO: 64 %
NONHDLC SERPL-MCNC: 147 MG/DL
NRBC # BLD AUTO: 0 10E3/UL
NRBC BLD AUTO-RTO: 0 /100
NT-PROBNP SERPL-MCNC: 3228 PG/ML (ref 0–900)
P AXIS - MUSE: 71 DEGREES
PLATELET # BLD AUTO: 289 10E3/UL (ref 150–450)
POTASSIUM SERPL-SCNC: 3.7 MMOL/L (ref 3.4–5.3)
PR INTERVAL - MUSE: 156 MS
PROT SERPL-MCNC: 6.3 G/DL (ref 6.4–8.3)
QRS DURATION - MUSE: 98 MS
QT - MUSE: 384 MS
QTC - MUSE: 448 MS
R AXIS - MUSE: -5 DEGREES
RBC # BLD AUTO: 4.35 10E6/UL (ref 3.8–5.2)
SODIUM SERPL-SCNC: 141 MMOL/L (ref 135–145)
SYSTOLIC BLOOD PRESSURE - MUSE: NORMAL MMHG
T AXIS - MUSE: 101 DEGREES
TRIGL SERPL-MCNC: 216 MG/DL
TROPONIN T SERPL HS-MCNC: 25 NG/L
TROPONIN T SERPL HS-MCNC: 29 NG/L
TSH SERPL DL<=0.005 MIU/L-ACNC: 1.5 UIU/ML (ref 0.3–4.2)
UFH PPP CHRO-ACNC: 0.27 IU/ML
VENTRICULAR RATE- MUSE: 82 BPM
WBC # BLD AUTO: 11.6 10E3/UL (ref 4–11)

## 2023-10-10 PROCEDURE — 120N000001 HC R&B MED SURG/OB

## 2023-10-10 PROCEDURE — 250N000013 HC RX MED GY IP 250 OP 250 PS 637: Performed by: INTERNAL MEDICINE

## 2023-10-10 PROCEDURE — G0378 HOSPITAL OBSERVATION PER HR: HCPCS

## 2023-10-10 PROCEDURE — 99223 1ST HOSP IP/OBS HIGH 75: CPT | Performed by: PHYSICIAN ASSISTANT

## 2023-10-10 PROCEDURE — 250N000009 HC RX 250: Performed by: EMERGENCY MEDICINE

## 2023-10-10 PROCEDURE — 84484 ASSAY OF TROPONIN QUANT: CPT | Performed by: EMERGENCY MEDICINE

## 2023-10-10 PROCEDURE — 96375 TX/PRO/DX INJ NEW DRUG ADDON: CPT | Mod: 59

## 2023-10-10 PROCEDURE — 36415 COLL VENOUS BLD VENIPUNCTURE: CPT | Performed by: PHYSICIAN ASSISTANT

## 2023-10-10 PROCEDURE — 93005 ELECTROCARDIOGRAM TRACING: CPT

## 2023-10-10 PROCEDURE — 80053 COMPREHEN METABOLIC PANEL: CPT | Performed by: EMERGENCY MEDICINE

## 2023-10-10 PROCEDURE — 255N000002 HC RX 255 OP 636: Performed by: INTERNAL MEDICINE

## 2023-10-10 PROCEDURE — 999N000208 ECHOCARDIOGRAM COMPLETE

## 2023-10-10 PROCEDURE — 96374 THER/PROPH/DIAG INJ IV PUSH: CPT | Mod: 59

## 2023-10-10 PROCEDURE — 999N000157 HC STATISTIC RCP TIME EA 10 MIN

## 2023-10-10 PROCEDURE — 94640 AIRWAY INHALATION TREATMENT: CPT | Mod: 76

## 2023-10-10 PROCEDURE — 99223 1ST HOSP IP/OBS HIGH 75: CPT | Mod: 25 | Performed by: INTERNAL MEDICINE

## 2023-10-10 PROCEDURE — 96365 THER/PROPH/DIAG IV INF INIT: CPT | Mod: 59

## 2023-10-10 PROCEDURE — 999N000156 HC STATISTIC RCP CONSULT EA 30 MIN

## 2023-10-10 PROCEDURE — 250N000011 HC RX IP 250 OP 636: Performed by: EMERGENCY MEDICINE

## 2023-10-10 PROCEDURE — 96366 THER/PROPH/DIAG IV INF ADDON: CPT | Mod: 59

## 2023-10-10 PROCEDURE — 85520 HEPARIN ASSAY: CPT | Performed by: EMERGENCY MEDICINE

## 2023-10-10 PROCEDURE — 83735 ASSAY OF MAGNESIUM: CPT | Performed by: PHYSICIAN ASSISTANT

## 2023-10-10 PROCEDURE — 83036 HEMOGLOBIN GLYCOSYLATED A1C: CPT | Performed by: PHYSICIAN ASSISTANT

## 2023-10-10 PROCEDURE — 250N000009 HC RX 250: Performed by: INTERNAL MEDICINE

## 2023-10-10 PROCEDURE — 250N000009 HC RX 250: Performed by: PHYSICIAN ASSISTANT

## 2023-10-10 PROCEDURE — 36415 COLL VENOUS BLD VENIPUNCTURE: CPT | Performed by: EMERGENCY MEDICINE

## 2023-10-10 PROCEDURE — 99285 EMERGENCY DEPT VISIT HI MDM: CPT | Mod: 25

## 2023-10-10 PROCEDURE — 250N000011 HC RX IP 250 OP 636: Mod: JZ | Performed by: EMERGENCY MEDICINE

## 2023-10-10 PROCEDURE — 80061 LIPID PANEL: CPT | Performed by: PHYSICIAN ASSISTANT

## 2023-10-10 PROCEDURE — 93306 TTE W/DOPPLER COMPLETE: CPT | Mod: 26 | Performed by: INTERNAL MEDICINE

## 2023-10-10 PROCEDURE — 84443 ASSAY THYROID STIM HORMONE: CPT | Performed by: PHYSICIAN ASSISTANT

## 2023-10-10 PROCEDURE — 250N000013 HC RX MED GY IP 250 OP 250 PS 637: Performed by: PHYSICIAN ASSISTANT

## 2023-10-10 PROCEDURE — 84484 ASSAY OF TROPONIN QUANT: CPT | Performed by: PHYSICIAN ASSISTANT

## 2023-10-10 PROCEDURE — 83880 ASSAY OF NATRIURETIC PEPTIDE: CPT | Performed by: EMERGENCY MEDICINE

## 2023-10-10 PROCEDURE — 85379 FIBRIN DEGRADATION QUANT: CPT | Performed by: EMERGENCY MEDICINE

## 2023-10-10 PROCEDURE — 250N000011 HC RX IP 250 OP 636: Mod: JZ | Performed by: PHYSICIAN ASSISTANT

## 2023-10-10 PROCEDURE — 85025 COMPLETE CBC W/AUTO DIFF WBC: CPT | Performed by: EMERGENCY MEDICINE

## 2023-10-10 PROCEDURE — 94640 AIRWAY INHALATION TREATMENT: CPT

## 2023-10-10 PROCEDURE — 71275 CT ANGIOGRAPHY CHEST: CPT

## 2023-10-10 PROCEDURE — 96376 TX/PRO/DX INJ SAME DRUG ADON: CPT | Mod: 59

## 2023-10-10 RX ORDER — SPIRONOLACTONE 25 MG
12.5 TABLET ORAL DAILY
Status: DISCONTINUED | OUTPATIENT
Start: 2023-10-10 | End: 2023-10-12 | Stop reason: HOSPADM

## 2023-10-10 RX ORDER — POTASSIUM CHLORIDE 1500 MG/1
20 TABLET, EXTENDED RELEASE ORAL ONCE
Status: COMPLETED | OUTPATIENT
Start: 2023-10-10 | End: 2023-10-10

## 2023-10-10 RX ORDER — ROSUVASTATIN CALCIUM 5 MG/1
5 TABLET, COATED ORAL DAILY
Status: DISCONTINUED | OUTPATIENT
Start: 2023-10-10 | End: 2023-10-12 | Stop reason: HOSPADM

## 2023-10-10 RX ORDER — MAGNESIUM OXIDE 400 MG/1
400 TABLET ORAL EVERY 4 HOURS
Status: COMPLETED | OUTPATIENT
Start: 2023-10-10 | End: 2023-10-10

## 2023-10-10 RX ORDER — ACETAMINOPHEN 325 MG/1
650 TABLET ORAL EVERY 6 HOURS PRN
Status: DISCONTINUED | OUTPATIENT
Start: 2023-10-10 | End: 2023-10-11

## 2023-10-10 RX ORDER — CARVEDILOL 3.12 MG/1
3.12 TABLET ORAL 2 TIMES DAILY WITH MEALS
Status: DISCONTINUED | OUTPATIENT
Start: 2023-10-10 | End: 2023-10-10

## 2023-10-10 RX ORDER — IPRATROPIUM BROMIDE AND ALBUTEROL SULFATE 2.5; .5 MG/3ML; MG/3ML
3 SOLUTION RESPIRATORY (INHALATION) 4 TIMES DAILY
Status: DISCONTINUED | OUTPATIENT
Start: 2023-10-10 | End: 2023-10-12

## 2023-10-10 RX ORDER — ONDANSETRON 2 MG/ML
4 INJECTION INTRAMUSCULAR; INTRAVENOUS EVERY 6 HOURS PRN
Status: DISCONTINUED | OUTPATIENT
Start: 2023-10-10 | End: 2023-10-12 | Stop reason: HOSPADM

## 2023-10-10 RX ORDER — IOPAMIDOL 755 MG/ML
500 INJECTION, SOLUTION INTRAVASCULAR ONCE
Status: COMPLETED | OUTPATIENT
Start: 2023-10-10 | End: 2023-10-10

## 2023-10-10 RX ORDER — GUAIFENESIN 200 MG/10ML
200 LIQUID ORAL EVERY 4 HOURS PRN
Status: DISCONTINUED | OUTPATIENT
Start: 2023-10-10 | End: 2023-10-12 | Stop reason: HOSPADM

## 2023-10-10 RX ORDER — BENZONATATE 100 MG/1
100 CAPSULE ORAL 3 TIMES DAILY PRN
Status: DISCONTINUED | OUTPATIENT
Start: 2023-10-10 | End: 2023-10-12 | Stop reason: HOSPADM

## 2023-10-10 RX ORDER — AMLODIPINE BESYLATE 5 MG/1
2.5 TABLET ORAL
Status: ON HOLD | COMMUNITY
End: 2023-10-12

## 2023-10-10 RX ORDER — FUROSEMIDE 10 MG/ML
40 INJECTION INTRAMUSCULAR; INTRAVENOUS EVERY 12 HOURS
Status: DISCONTINUED | OUTPATIENT
Start: 2023-10-10 | End: 2023-10-12

## 2023-10-10 RX ORDER — CARVEDILOL 6.25 MG/1
6.25 TABLET ORAL 2 TIMES DAILY WITH MEALS
Status: DISCONTINUED | OUTPATIENT
Start: 2023-10-11 | End: 2023-10-12 | Stop reason: HOSPADM

## 2023-10-10 RX ORDER — ONDANSETRON 4 MG/1
4 TABLET, ORALLY DISINTEGRATING ORAL EVERY 6 HOURS PRN
Status: DISCONTINUED | OUTPATIENT
Start: 2023-10-10 | End: 2023-10-12 | Stop reason: HOSPADM

## 2023-10-10 RX ORDER — IPRATROPIUM BROMIDE AND ALBUTEROL SULFATE 2.5; .5 MG/3ML; MG/3ML
3 SOLUTION RESPIRATORY (INHALATION)
Status: DISCONTINUED | OUTPATIENT
Start: 2023-10-10 | End: 2023-10-10

## 2023-10-10 RX ORDER — ALBUTEROL SULFATE 0.83 MG/ML
2.5 SOLUTION RESPIRATORY (INHALATION)
Status: DISCONTINUED | OUTPATIENT
Start: 2023-10-10 | End: 2023-10-12

## 2023-10-10 RX ORDER — AMOXICILLIN 250 MG
2 CAPSULE ORAL 2 TIMES DAILY PRN
Status: DISCONTINUED | OUTPATIENT
Start: 2023-10-10 | End: 2023-10-12 | Stop reason: HOSPADM

## 2023-10-10 RX ORDER — CETIRIZINE HYDROCHLORIDE 10 MG/1
10 TABLET ORAL DAILY
Status: DISCONTINUED | OUTPATIENT
Start: 2023-10-10 | End: 2023-10-12 | Stop reason: HOSPADM

## 2023-10-10 RX ORDER — AMOXICILLIN 250 MG
1 CAPSULE ORAL 2 TIMES DAILY PRN
Status: DISCONTINUED | OUTPATIENT
Start: 2023-10-10 | End: 2023-10-12 | Stop reason: HOSPADM

## 2023-10-10 RX ORDER — CARVEDILOL 3.12 MG/1
3.12 TABLET ORAL ONCE
Status: COMPLETED | OUTPATIENT
Start: 2023-10-10 | End: 2023-10-10

## 2023-10-10 RX ORDER — ASPIRIN 81 MG/1
81 TABLET ORAL DAILY
Status: DISCONTINUED | OUTPATIENT
Start: 2023-10-10 | End: 2023-10-12 | Stop reason: HOSPADM

## 2023-10-10 RX ORDER — LISINOPRIL 2.5 MG/1
2.5 TABLET ORAL DAILY
Status: DISCONTINUED | OUTPATIENT
Start: 2023-10-10 | End: 2023-10-12 | Stop reason: HOSPADM

## 2023-10-10 RX ORDER — FUROSEMIDE 10 MG/ML
40 INJECTION INTRAMUSCULAR; INTRAVENOUS ONCE
Status: COMPLETED | OUTPATIENT
Start: 2023-10-10 | End: 2023-10-10

## 2023-10-10 RX ORDER — HEPARIN SODIUM 10000 [USP'U]/100ML
0-5000 INJECTION, SOLUTION INTRAVENOUS CONTINUOUS
Status: DISCONTINUED | OUTPATIENT
Start: 2023-10-10 | End: 2023-10-11

## 2023-10-10 RX ADMIN — CARVEDILOL 3.12 MG: 3.12 TABLET, FILM COATED ORAL at 17:29

## 2023-10-10 RX ADMIN — MAGNESIUM OXIDE TAB 400 MG (241.3 MG ELEMENTAL MG) 400 MG: 400 (241.3 MG) TAB at 21:10

## 2023-10-10 RX ADMIN — SODIUM CHLORIDE 100 ML: 9 INJECTION, SOLUTION INTRAVENOUS at 11:15

## 2023-10-10 RX ADMIN — HEPARIN SODIUM 1100 UNITS/HR: 10000 INJECTION, SOLUTION INTRAVENOUS at 13:12

## 2023-10-10 RX ADMIN — FUROSEMIDE 40 MG: 10 INJECTION, SOLUTION INTRAMUSCULAR; INTRAVENOUS at 22:12

## 2023-10-10 RX ADMIN — POTASSIUM CHLORIDE 20 MEQ: 1500 TABLET, EXTENDED RELEASE ORAL at 17:29

## 2023-10-10 RX ADMIN — FUROSEMIDE 40 MG: 10 INJECTION, SOLUTION INTRAMUSCULAR; INTRAVENOUS at 11:55

## 2023-10-10 RX ADMIN — ACETAMINOPHEN 650 MG: 325 TABLET, FILM COATED ORAL at 17:29

## 2023-10-10 RX ADMIN — IPRATROPIUM BROMIDE AND ALBUTEROL SULFATE 3 ML: .5; 3 SOLUTION RESPIRATORY (INHALATION) at 19:29

## 2023-10-10 RX ADMIN — ROSUVASTATIN CALCIUM 5 MG: 5 TABLET, FILM COATED ORAL at 17:22

## 2023-10-10 RX ADMIN — IPRATROPIUM BROMIDE AND ALBUTEROL SULFATE 3 ML: .5; 3 SOLUTION RESPIRATORY (INHALATION) at 15:30

## 2023-10-10 RX ADMIN — HUMAN ALBUMIN MICROSPHERES AND PERFLUTREN 3 ML: 10; .22 INJECTION, SOLUTION INTRAVENOUS at 15:07

## 2023-10-10 RX ADMIN — CARVEDILOL 3.12 MG: 3.12 TABLET, FILM COATED ORAL at 22:13

## 2023-10-10 RX ADMIN — ASPIRIN 81 MG: 81 TABLET, COATED ORAL at 17:22

## 2023-10-10 RX ADMIN — IOPAMIDOL 68 ML: 755 INJECTION, SOLUTION INTRAVENOUS at 11:15

## 2023-10-10 RX ADMIN — SPIRONOLACTONE 12.5 MG: 25 TABLET ORAL at 17:21

## 2023-10-10 RX ADMIN — MAGNESIUM OXIDE TAB 400 MG (241.3 MG ELEMENTAL MG) 400 MG: 400 (241.3 MG) TAB at 17:29

## 2023-10-10 RX ADMIN — CETIRIZINE HYDROCHLORIDE 10 MG: 10 TABLET, FILM COATED ORAL at 17:21

## 2023-10-10 RX ADMIN — LISINOPRIL 2.5 MG: 2.5 TABLET ORAL at 17:22

## 2023-10-10 ASSESSMENT — ACTIVITIES OF DAILY LIVING (ADL)
ADLS_ACUITY_SCORE: 35

## 2023-10-10 NOTE — CONSULTS
[facility]    Cardiology Consultation     Date of Admission:  10/10/2023    Assessment & Plan   Rhoda Ayala is a 63 year old female who was admitted on 10/10/2023.    Impression:  1.  Congestive heart failure.  At this time we do not have echo information to let us know whether this is systolic or diastolic congestive heart failure.  CT suggested cardiomegaly but on many occasions CT can overestimate the size of the heart.  2.  Angina pectoris.  Her symptoms are very suggestive of this.  3.  Possible non-Q wave myocardial infarct with small enzyme leak.  4.  Patient does have mild coronary artery disease.  I reviewed the CT scan for the pulmonary embolism and she has clear-cut calcification at the distal left main LAD junction and also in the proximal portion of the first diagonal artery and in the proximal right coronary artery also.    Plan:  1.  Fully agree with intravenous furosemide 40 mg IV twice a day.  2.  Also agree with intravenous heparin which was started when I discussed the case with the emergency room physician.  3.  We will start the patient on aspirin 81 mg/day and she has clear-cut coronary artery disease.  4.  I will start the patient on rosuvastatin 5mg/day.  Patient has had myalgias to pravastatin and atorvastatin so I am starting at a lower dose.  5.  Coronary angiography is clearly indicated in this patient with symptoms suggestive of angina pectoris and evidence of congestive heart failure.  6.  Depending on the findings of the coronary angiogram we may introduce beta-blockers especially if we feel that the patient is compensated with respect to her heart failure.  7.  If she has cardiomegaly and reduced left ventricular systolic function she will be a candidate for ACE inhibitors but again this is dependent upon the findings on the echocardiogram.        Parker Figueredo MD, FACC, FRCPI    Primary Care Physician   Fili Vasquez MD    Reason for Consult    Reason for consult: I was asked by hospitalist service to evaluate this patient for heart failure and raised troponin..    History of Present Illness   Rhoda Ayala is a 63 year old female who presents with 5 days of dyspnea on exertion.  The patient initially went to an urgent care facility for cough and dyspnea on exertion.  She was diagnosed with pneumonia and started on antibiotics.  However her shortness of breath did not improve and became increasingly worse.  She also began to develop orthopnea and PND.  She had a CT scan of her chest performed in the emergency room here to rule out pulmonary embolism.  However, the CT showed that she had evidence of interstitial edema pleural effusions and cardiomegaly.  Echocardiogram has not been performed yet.  The patient had a small troponin rise which is flat.  The EKG showed nonspecific T wave changes in the lateral leads which were new from her EKG from a decade ago.  The patient noticed that if she walked approximately 20 yards she would become short of breath but she would also develop chest discomfort with that.  When she stopped the shortness of breath and chest discomfort would go away.  This is suggestive of angina pectoris.  The patient has significant risk factors for coronary artery disease including ongoing smoking, essential hypertension, family history of coronary artery disease with her father had stents placed.  She is not a diabetic.  She does not have a history of hyperlipidemia.    Past Medical History   Past Medical History:   Diagnosis Date    Asthma     Cellulitis     s/p I&D of wound    Hypertension     Hypothyroidism     Lower extremity edema     Melanoma (H) 2015    S/P appendectomy     S/P arthroscopic knee surgery     S/P  section     S/P lateral meniscectomy of right knee     S/P JOSEP (total abdominal hysterectomy)     Seasonal allergies          Past Surgical History   Past Surgical History:   Procedure Laterality Date     APPENDECTOMY      COLONOSCOPY N/A 1/21/2016    Procedure: COLONOSCOPY;  Surgeon: Axel Rivera MD;  Location: [facility]    HYSTERECTOMY, PAP NO LONGER INDICATED           Prior to Admission Medications   Prior to Admission Medications   Prescriptions Last Dose Informant Patient Reported? Taking?   Levothyroxine Sodium (LEVOTHROID PO) 10/10/2023 at am  Yes Yes   Sig: Take 175 mcg by mouth   SUMAtriptan (IMITREX) 100 MG tablet Past Week  No Yes   Sig: TAKE 1 TABLET BY MOUTH AT ONSET OF HEADACHE AS DIRECTED   albuterol (PROAIR HFA/PROVENTIL HFA/VENTOLIN HFA) 108 (90 Base) MCG/ACT inhaler   No Yes   Sig: INHALE 2 PUFFS INTO THE LUNGS EVERY 6 HOURS AS NEEDED FOR SHORTNESS OF BREATH OR DIFFICULT BREATHING OR WHEEZING   albuterol (PROVENTIL) (2.5 MG/3ML) 0.083% neb solution 10/10/2023 at 0800  No Yes   Sig: Take 1 vial (2.5 mg) by nebulization every 6 hours as needed for shortness of breath / dyspnea or wheezing   amLODIPine (NORVASC) 5 MG tablet Past Week  Yes Yes   Sig: Take 2.5 mg by mouth every 72 hours   budesonide (PULMICORT FLEXHALER) 180 MCG/ACT inhaler Past Week at uses seasonally  No Yes   Sig: INHALE 2 PUFFS INTO THE LUNGS TWICE DAILY Due for appointment. Please schedule: [phone]   cetirizine (ZYRTEC) 10 MG tablet 10/8/2023  Yes Yes   Sig: Take 10 mg by mouth daily   cyclobenzaprine (FLEXERIL) 5 MG tablet Past Week  No Yes   Sig: TAKE 1 TABLET(5 MG) BY MOUTH THREE TIMES DAILY AS NEEDED FOR MUSCLE SPASMS   guaiFENesin (MUCINEX) 600 MG 12 hr tablet Past Month  Yes Yes   Sig: Take 1,200 mg by mouth 2 times daily as needed for congestion   hydrochlorothiazide (HYDRODIURIL) 25 MG tablet 10/9/2023  No Yes   Sig: TAKE 1 TABLET BY MOUTH EVERY 48 HOURS   ibuprofen (ADVIL/MOTRIN) 200 MG tablet 10/9/2023  Yes Yes   Sig: Take 400-600 mg by mouth 4 times daily as needed    melatonin 5 MG tablet 10/9/2023  Yes Yes   Sig: Take 5 mg by mouth nightly as needed for sleep   vitamin D3 (CHOLECALCIFEROL) 50 mcg (2000 units)  tablet Past Month  Yes Yes   Sig: Take 1 tablet by mouth daily      Facility-Administered Medications: None     Current Facility-Administered Medications   Medication Dose Route Frequency     Current Facility-Administered Medications   Medication Last Rate    heparin 1,100 Units/hr (10/10/23 1312)     Allergies   Allergies   Allergen Reactions    Atorvastatin Muscle Pain (Myalgia)    Pravastatin      Edema, cramping    Singulair [Montelukast Sodium]      Back aches       Social History    reports that she has been smoking cigarettes. She has a 0.75 pack-year smoking history. She has never used smokeless tobacco. She reports current alcohol use. She reports that she does not use drugs.      Family History   I have reviewed this patient's family history and updated it with pertinent information if needed.  Family History   Problem Relation Age of Onset    Hypertension Father     Thyroid Disease Mother         graves disease    Arthritis Mother         Rheumatoid    Osteoporosis Mother     Colon Cancer No family hx of           Review of Systems   A comprehensive review of system was performed and is negative other than that noted in the HPI or here.     Physical Exam   Vital Signs with Ranges  Temp:  [96.5  F (35.8  C)] 96.5  F (35.8  C)  Pulse:  [77-86] 84  Resp:  [16] 16  BP: (139-161)/(105-116) 159/116  SpO2:  [96 %-97 %] 97 %  Wt Readings from Last 4 Encounters:   10/10/23 94.8 kg (208 lb 15.9 oz)   09/21/22 91.6 kg (202 lb)   02/23/22 90.3 kg (199 lb)   12/09/20 103.4 kg (228 lb)     No intake/output data recorded.      Vitals: BP (!) 159/116   Pulse 84   Temp (!) 96.5  F (35.8  C) (Temporal)   Resp 16   Wt 94.8 kg (208 lb 15.9 oz)   SpO2 97%   BMI 32.73 kg/m      Physical Exam:   General - Alert and oriented to time place and person in no acute distress  Eyes - No scleral icterus  HEENT - Neck supple, moist mucous membranes  Cardiovascular -heart sounds 1 and 2 are normal.  S3 and S4 noted.  Jugular venous  pulse does not appear to be raised and the carotids are normal with no bruits.  Extremities - There is no peripheral edema  Respiratory -diminished breath sounds at both bases with crackles at both bases.  Skin - No pallor or cyanosis  Gastrointestinal - Non tender and non distended without rebound or guarding  Psych - Appropriate affect   Neurological - No gross motor neurological focal deficits    No lab results found in last 7 days.    Invalid input(s): TROPONINIES    Recent Labs   Lab 10/10/23  1014   WBC 11.6*   HGB 13.2   MCV 90         POTASSIUM 3.7   CHLORIDE 104   CO2 25   BUN 14.4   CR 0.88   GFRESTIMATED 73   ANIONGAP 12   DIANNA 9.5   *   ALBUMIN 4.3   PROTTOTAL 6.3*   BILITOTAL 0.3   ALKPHOS 82   ALT 30   AST 15     Recent Labs   Lab Test 02/23/22  1042 12/09/20  0925   CHOL 204* 154   HDL 37* 34*   * 65   TRIG 232* 276*     Recent Labs   Lab 10/10/23  1014   WBC 11.6*   HGB 13.2   HCT 39.2   MCV 90        No results for input(s): PH, PHV, PO2, PO2V, SAT, PCO2, PCO2V, HCO3, HCO3V in the last 168 hours.  Recent Labs   Lab 10/10/23  1014   NTBNPI 3,228*     Recent Labs   Lab 10/10/23  1014   DD 0.59*     No results for input(s): SED, CRP in the last 168 hours.  Recent Labs   Lab 10/10/23  1014        No results for input(s): TSH in the last 168 hours.  No results for input(s): COLOR, APPEARANCE, URINEGLC, URINEBILI, URINEKETONE, SG, UBLD, URINEPH, PROTEIN, UROBILINOGEN, NITRITE, LEUKEST, RBCU, WBCU in the last 168 hours.    Imaging:  Recent Results (from the past 48 hour(s))   CT Chest Pulmonary Embolism w Contrast    Narrative    CT CHEST PULMONARY EMBOLISM WITH CONTRAST 10/10/2023 11:25 AM    CLINICAL HISTORY: Shortness of breath with exertion, recent cold x2,  not improving.    TECHNIQUE: CT angiogram chest during arterial phase injection IV  contrast. 2D and 3D MIP reconstructions were performed by the CT  technologist. Dose reduction techniques were used.      CONTRAST: 68mL Isovue-370    COMPARISON: Chest radiograph 1/4/2017.    FINDINGS:  ANGIOGRAM CHEST: No acute pulmonary embolus. Thoracic aorta is  nonaneurysmal. No CT evidence of right heart strain.    LOWER NECK: Unremarkable.    LUNGS AND PLEURA: Interstitial and alveolar pulmonary edema. No focal  consolidation. Bilateral small, right greater than left, pleural  effusions with adjacent atelectasis. No suspicious pulmonary nodule.    AIRWAYS: Mild bronchial wall thickening and scattered mucus plugging.    HEART: Mild cardiomegaly. No pericardial effusion. Minimal coronary  artery calcification.    MEDIASTINUM/AXILLAE: No lymphadenopathy. Normal esophagus. No  significant pericardial effusion.    UPPER ABDOMEN: No acute findings.    MUSCULOSKELETAL: Mild degenerative changes of the spine. No aggressive  osseous lesion.      Impression    IMPRESSION:  1.  No convincing pulmonary embolus.  2.  Findings consistent with congestive heart failure including  cardiomegaly, interstitial and alveolar pulmonary edema, and bilateral  (right greater than left) small pleural effusions.    NICOLE VASQUEZ MD         SYSTEM ID:  [id number]       Echo:  No results found for this or any previous visit (from the past 4320 hour(s)).    Clinically Significant Risk Factors Present on Admission                  # Hypertension: Noted on problem list          # Asthma: noted on problem list       Systolic acute

## 2023-10-10 NOTE — PLAN OF CARE
Goal Outcome Evaluation:       ER admit with CP and sob. B/p 151/106. Denies cp upon admission to the floor. Hep gtt infusing at 1100. Echo at bedside. Tele SR. Sats stable on RA. Up SBA-ind.

## 2023-10-10 NOTE — ED NOTES
[facility]  ED Nurse Handoff Report    ED Chief complaint: Shortness of Breath  . ED Diagnosis:   Final diagnoses:   Acute congestive heart failure, unspecified heart failure type (H)   Chest pain, unspecified type       Allergies:   Allergies   Allergen Reactions    Atorvastatin Muscle Pain (Myalgia)    Pravastatin      Edema, cramping    Singulair [Montelukast Sodium]      Back aches       Code Status: Full Code    Activity level - Baseline/Home:  independent.  Activity Level - Current:   independent.   Lift room needed: No.   Bariatric: No   Needed: No   Isolation: No.   Infection: Not Applicable.     Respiratory status: Room air    Vital Signs (within 30 minutes):   Vitals:    10/10/23 0930 10/10/23 1000 10/10/23 1216 10/10/23 1233   BP: (!) 139/105 (!) 150/106 (!) 159/116    Pulse: 78 77 84    Resp:       Temp:       TempSrc:       SpO2: 96% 96% 97%    Weight:    94.8 kg (208 lb 15.9 oz)       Cardiac Rhythm:  ,      Pain level:    Patient confused: No.   Patient Falls Risk: patient and family education.   Elimination Status: Has voided     Patient Report - Initial Complaint: Shortness of breath.   Focused Assessment:  Rhoda Ayala is a 63 year old female with a history of asthma and hypertension who presents with shortness of breath and midsternal chest pain/ epigastric pain with exertion. Patient was seen for shortness of breath, cough, and congestion in urgent care 10/4, diagnosed with pneumonia without imaging and started on 5 days of doxycycline and prednisone. She has finished her course of antibiotics and steroids, and shortness of breath has not improved. She reports she cannot walk more than 20 feet without needing to stop and recover due to shortness of breath, pain, and some lightheadedness. She has been taking about 3 albuterol nebulizer treatments a day for the past week. Denies personal history of MI. She does note history of heart disease in her father. Denies  history of diabetes mellitus, tobacco use. She last had a stress test over 10 years ago. Denies history of DVT/PE or recent prolonged travel. Notes history of asthma without flare-ups for many years. She follows with her PCP for asthma management.     Abnormal Results:   Labs Ordered and Resulted from Time of ED Arrival to Time of ED Departure   D DIMER QUANTITATIVE - Abnormal       Result Value    D-Dimer Quantitative 0.59 (*)    COMPREHENSIVE METABOLIC PANEL - Abnormal    Sodium 141      Potassium 3.7      Carbon Dioxide (CO2) 25      Anion Gap 12      Urea Nitrogen 14.4      Creatinine 0.88      GFR Estimate 73      Calcium 9.5      Chloride 104      Glucose 109 (*)     Alkaline Phosphatase 82      AST 15      ALT 30      Protein Total 6.3 (*)     Albumin 4.3      Bilirubin Total 0.3     TROPONIN T, HIGH SENSITIVITY - Abnormal    Troponin T, High Sensitivity 29 (*)    NT PROBNP INPATIENT - Abnormal    N terminal Pro BNP Inpatient 3,228 (*)    CBC WITH PLATELETS AND DIFFERENTIAL - Abnormal    WBC Count 11.6 (*)     RBC Count 4.35      Hemoglobin 13.2      Hematocrit 39.2      MCV 90      MCH 30.3      MCHC 33.7      RDW 12.7      Platelet Count 289      % Neutrophils 64      % Lymphocytes 20      % Monocytes 6      Mids % (Monos, Eos, Basos)        % Eosinophils 8      % Basophils 1      % Immature Granulocytes 1      NRBCs per 100 WBC 0      Absolute Neutrophils 7.4      Absolute Lymphocytes 2.3      Absolute Monocytes 0.7      Mids Abs (Monos, Eos, Basos)        Absolute Eosinophils 0.9 (*)     Absolute Basophils 0.2      Absolute Immature Granulocytes 0.1      Absolute NRBCs 0.0     TROPONIN T, HIGH SENSITIVITY        CT Chest Pulmonary Embolism w Contrast   Final Result   IMPRESSION:   1.  No convincing pulmonary embolus.   2.  Findings consistent with congestive heart failure including   cardiomegaly, interstitial and alveolar pulmonary edema, and bilateral   (right greater than left) small pleural effusions.       NICOLE VASQUEZ MD            SYSTEM ID:  [id number]      Echocardiogram Complete    (Results Pending)       Treatments provided: See eMAR  Family Comments: N/A  OBS brochure/video discussed/provided to patient:  No  ED Medications:   Medications   sodium chloride 0.9 % bag 500 mL for CT scan flush use (100 mLs As instructed $Given 10/10/23 1115)   heparin loading dose for LOW INTENSITY TREATMENT * Give BEFORE starting heparin infusion (has no administration in time range)   heparin 25,000 units in 0.45% NaCl 250 mL ANTICOAGULANT infusion (has no administration in time range)   iopamidol (ISOVUE-370) solution 500 mL (68 mLs Intravenous $Given 10/10/23 1115)   furosemide (LASIX) injection 40 mg (40 mg Intravenous $Given 10/10/23 1155)       Drips infusing:  Yes  For the majority of the shift this patient was Green.   Interventions performed were See eMAR.    Sepsis treatment initiated: No    Cares/treatment/interventions/medications to be completed following ED care: N/A    ED Nurse Name: Bobbi Grant RN  12:42 PM  RECEIVING UNIT ED HANDOFF REVIEW    Above ED Nurse Handoff Report was reviewed: Yes  Reviewed by: Ugo Pollard RN on October 10, 2023 at 1:52 PM

## 2023-10-10 NOTE — PROGRESS NOTES
"[facility] RCAT     Date:10/10/23  Admission Dx:CHF  Pulmonary History Hx asthma  Home Nebulizer/MDI Use:Pulmicort MDI BID, ALB nebs& MDI  Home Oxygen:NA  Acuity Level (RCAT flow sheet):Level 3  Aerosol Therapy initiated:  Duoneb QID and Alb prn    Pulmonary Hygiene initiated:NA      Volume Expansion initiated:  Incentive spirometry TID    Current Oxygen Requirements:Room air  Current SpO2:94 %    Re-evaluation date:10/13/23    Patient Education:Education was performed with the patient in regards to indications/benefits and possible side effects of bronchodilators. Will continue to do education with patient.         See \"RT Assessments\" flow sheet for patient assessment scoring and Acuity Level Details.             "

## 2023-10-10 NOTE — ED PROVIDER NOTES
"  History     Chief Complaint:  Shortness of Breath       The history is provided by the patient.      Rhoda Ayala is a 63 year old female with a history of asthma and hypertension who presents with shortness of breath and midsternal chest pain/ epigastric pain with exertion. Patient was seen for shortness of breath, cough, and congestion in urgent care 10/4, diagnosed with pneumonia without imaging and started on 5 days of doxycycline and prednisone. She has finished her course of antibiotics and steroids, and shortness of breath has not improved. She reports she cannot walk more than 20 feet without needing to stop and recover due to shortness of breath, pain, and some lightheadedness. She has been taking about 3 albuterol nebulizer treatments a day for the past week. Denies personal history of MI. She does note history of heart disease in her father. Denies history of diabetes mellitus, tobacco use. She last had a stress test over 10 years ago. Denies history of DVT/PE or recent prolonged travel. Notes history of asthma without flare-ups for many years. She follows with her PCP for asthma management.    Independent Historian:   None - Patient Only    Review of External Notes:   Chart review     Medications:    Albuterol inhaler   Proventil  Amlodipine  Pulmicort flexihaler  Levothyroxine   Cozaar  Imitrex    Past Medical History:    Asthma  Hypertension   Hypothyroidism   Melanoma  Migraine  Tobacco abuse  Obesity  Irritable bowel syndrome      Past Surgical History:    Appendectomy   Right knee arthroscopy    section   Total abdominal hysterectomy   Colonoscopy      Physical Exam   Patient Vitals for the past 24 hrs:   BP Temp Temp src Pulse Resp SpO2 Height Weight   10/10/23 1416 (!) 151/106 98.1  F (36.7  C) Oral 92 18 95 % 1.702 m (5' 7\") 93.4 kg (206 lb)   10/10/23 1233 -- -- -- -- -- -- -- 94.8 kg (208 lb 15.9 oz)   10/10/23 1216 (!) 159/116 -- -- 84 -- 97 % -- --   10/10/23 1000 (!) 150/106 -- -- " 77 -- 96 % -- --   10/10/23 0930 (!) 139/105 -- -- 78 -- 96 % -- --   10/10/23 0835 (!) 161/113 (!) 96.5  F (35.8  C) Temporal 86 16 97 % -- --        Physical Exam  GENERAL: well developed, pleasant  HEAD: atraumatic  EYES: pupils reactive, extraocular muscles intact, conjunctivae normal  ENT:  mucus membranes moist  NECK:  trachea midline, normal range of motion  RESPIRATORY: no tachypnea, no wheezing, occasional coarse breath sounds in the right lung base  CVS: normal S1/S2, no murmurs, intact distal pulses  ABDOMEN: soft, nontender, nondistention  MUSCULOSKELETAL: no deformities  SKIN: warm and dry, no acute rashes or ulceration  NEURO: GCS 15, cranial nerves intact, alert and oriented x3  PSYCH:  Mood/affect normal    Emergency Department Course   ECG:  ECG results from 10/10/23   EKG 12 lead     Value    Systolic Blood Pressure     Diastolic Blood Pressure     Ventricular Rate 82    Atrial Rate 82    SC Interval 156    QRS Duration 98        QTc 448    P Axis 71    R AXIS -5    T Axis 101    Interpretation ECG      Sinus rhythm  Possible Left atrial enlargement  Left ventricular hypertrophy  T wave abnormality, consider lateral ischemia  Abnormal ECG  When compared with ECG of 25-NOV-2002 13:04,  T wave inversion now evident in Anterolateral leads  Confirmed by - EMERGENCY ROOM, PHYSICIAN ([id number]),  JAMARI MELENDEZ ([id number]) on 10/10/2023 12:17:05 PM         Imaging:  CT Chest Pulmonary Embolism w Contrast   Final Result   IMPRESSION:   1.  No convincing pulmonary embolus.   2.  Findings consistent with congestive heart failure including   cardiomegaly, interstitial and alveolar pulmonary edema, and bilateral   (right greater than left) small pleural effusions.      NICOLE VASQUEZ MD            SYSTEM ID:  [id number]      Echocardiogram Complete    (Results Pending)      Report per radiology    Laboratory:  Labs Ordered and Resulted from Time of ED Arrival to Time of ED Departure   D DIMER  QUANTITATIVE - Abnormal       Result Value    D-Dimer Quantitative 0.59 (*)    COMPREHENSIVE METABOLIC PANEL - Abnormal    Sodium 141      Potassium 3.7      Carbon Dioxide (CO2) 25      Anion Gap 12      Urea Nitrogen 14.4      Creatinine 0.88      GFR Estimate 73      Calcium 9.5      Chloride 104      Glucose 109 (*)     Alkaline Phosphatase 82      AST 15      ALT 30      Protein Total 6.3 (*)     Albumin 4.3      Bilirubin Total 0.3     TROPONIN T, HIGH SENSITIVITY - Abnormal    Troponin T, High Sensitivity 29 (*)    NT PROBNP INPATIENT - Abnormal    N terminal Pro BNP Inpatient 3,228 (*)    CBC WITH PLATELETS AND DIFFERENTIAL - Abnormal    WBC Count 11.6 (*)     RBC Count 4.35      Hemoglobin 13.2      Hematocrit 39.2      MCV 90      MCH 30.3      MCHC 33.7      RDW 12.7      Platelet Count 289      % Neutrophils 64      % Lymphocytes 20      % Monocytes 6      Mids % (Monos, Eos, Basos)        % Eosinophils 8      % Basophils 1      % Immature Granulocytes 1      NRBCs per 100 WBC 0      Absolute Neutrophils 7.4      Absolute Lymphocytes 2.3      Absolute Monocytes 0.7      Mids Abs (Monos, Eos, Basos)        Absolute Eosinophils 0.9 (*)     Absolute Basophils 0.2      Absolute Immature Granulocytes 0.1      Absolute NRBCs 0.0     TROPONIN T, HIGH SENSITIVITY - Abnormal    Troponin T, High Sensitivity 25 (*)         Emergency Department Course & Assessments:       Interventions:  Medications   sodium chloride 0.9 % bag 500 mL for CT scan flush use (100 mLs As instructed $Given 10/10/23 1115)   heparin 25,000 units in 0.45% NaCl 250 mL ANTICOAGULANT infusion (1,100 Units/hr Intravenous $New Bag 10/10/23 1312)   albuterol (PROVENTIL) neb solution 2.5 mg (has no administration in time range)   cetirizine (zyrTEC) tablet 10 mg (has no administration in time range)   levothyroxine (SYNTHROID/LEVOTHROID) tablet 175 mcg (has no administration in time range)   budesonide (PULMICORT FLEXHALER) inhaler 1 puff (has no  administration in time range)   ipratropium - albuterol 0.5 mg/2.5 mg/3 mL (DUONEB) neb solution 3 mL (has no administration in time range)   guaiFENesin (ROBITUSSIN) 20 mg/mL solution 200 mg (has no administration in time range)   benzonatate (TESSALON) capsule 100 mg (has no administration in time range)   iopamidol (ISOVUE-370) solution 500 mL (68 mLs Intravenous $Given 10/10/23 1115)   furosemide (LASIX) injection 40 mg (40 mg Intravenous $Given 10/10/23 1155)   heparin loading dose for LOW INTENSITY TREATMENT * Give BEFORE starting heparin infusion (5,700 Units Intravenous $Given 10/10/23 1313)        Independent Interpretation (X-rays, CTs, rhythm strip):  na    Assessments/Consultations/Discussion of Management or Tests:  ED Course as of 10/10/23 1431   Tue Oct 10, 2023   0950 I obtained the history and examined the patient as noted above.    1145 I rechecked and updated the patient regarding lab and imaging results.   1208 I consulted with Lizette Vasquez PA-C, of the hospitalist team, regarding the patient. They accepted the patient for admission on behalf of Dr. Castro.   1219 I spoke with Dr. Claros, cardiology, regarding the patient.   1307 I rechecked and updated the patient.        Social Determinants of Health affecting care:   None    Disposition:  The patient was admitted to the hospital under the care of Dr. Castro.     Impression & Plan    Medical Decision Making:    Patient presents with shortness of breath that is progressively getting worse.  She has thought that is been asthma and/or pneumonia or infectious.  Now getting worsening symptoms and developing some epigastric lower chest pain with exertion.  EKG shows some flattening inversion of the T waves in the lateral leads which looks new from an old EKG but its been numerous years since that EKG was obtained.  BNP troponin and CT chest suggestive of new onset CHF with cardiomegaly pleural effusion.  Given the chest pain component  abnormal EKG patient given aspirin and heparin.  Patient given Lasix.  Spoke with the hospitalist as well as cardiology.    Diagnosis:    ICD-10-CM    1. Acute congestive heart failure, unspecified heart failure type (H)  I50.9       2. Chest pain, unspecified type  R07.9            Scribe Disclosure:  Swathi HANNAH, am serving as a scribe at 10:14 AM on 10/10/2023 to document services personally performed by Orlando Hernandez MD based on my observations and the provider's statements to me.     10/10/2023   Orlando Hernandez MD Adams, Shaun L, MD  10/10/23 [id number]

## 2023-10-10 NOTE — PROGRESS NOTES
Patient Transfer Information  Patient connected to monitoring equipment on arrival: yes Vital signs monitor tele      Patient connected to wall oxygen on arrival: N/A    Belongings: Transferred with patient    Safety check completed: Yes

## 2023-10-10 NOTE — PHARMACY-ADMISSION MEDICATION HISTORY
Pharmacist Admission Medication History    Admission medication history is complete. The information provided in this note is only as accurate as the sources available at the time of the update.    Information Source(s): Patient and CareEverywhere/SureScripts via in-person    Pertinent Information: pulmicort uses seasonally started in August per patient report    Just completed Doxycycline and Prednisone prescriptions yesterday    Changes made to PTA medication list:  Added: none  Deleted: losartan  Changed: amlodipine    Medication Affordability:  Not including over the counter (OTC) medications, was there a time in the past 3 months when you did not take your medications as prescribed because of cost?: No    Allergies reviewed with patient and updates made in EHR: yes    Medication History Completed By: Flori Jimenez [facility] 10/10/2023 12:58 PM    PTA Med List   Medication Sig Last Dose    albuterol (PROAIR HFA/PROVENTIL HFA/VENTOLIN HFA) 108 (90 Base) MCG/ACT inhaler INHALE 2 PUFFS INTO THE LUNGS EVERY 6 HOURS AS NEEDED FOR SHORTNESS OF BREATH OR DIFFICULT BREATHING OR WHEEZING     albuterol (PROVENTIL) (2.5 MG/3ML) 0.083% neb solution Take 1 vial (2.5 mg) by nebulization every 6 hours as needed for shortness of breath / dyspnea or wheezing 10/10/2023 at 0800    amLODIPine (NORVASC) 5 MG tablet Take 2.5 mg by mouth every 72 hours Past Week    budesonide (PULMICORT FLEXHALER) 180 MCG/ACT inhaler INHALE 2 PUFFS INTO THE LUNGS TWICE DAILY Due for appointment. Please schedule: [phone] Past Week at uses seasonally    cetirizine (ZYRTEC) 10 MG tablet Take 10 mg by mouth daily 10/8/2023    cyclobenzaprine (FLEXERIL) 5 MG tablet TAKE 1 TABLET(5 MG) BY MOUTH THREE TIMES DAILY AS NEEDED FOR MUSCLE SPASMS Past Week    guaiFENesin (MUCINEX) 600 MG 12 hr tablet Take 1,200 mg by mouth 2 times daily as needed for congestion Past Month    hydrochlorothiazide (HYDRODIURIL) 25 MG tablet TAKE 1 TABLET BY MOUTH EVERY 48 HOURS  10/9/2023    ibuprofen (ADVIL/MOTRIN) 200 MG tablet Take 400-600 mg by mouth 4 times daily as needed  10/9/2023    Levothyroxine Sodium (LEVOTHROID PO) Take 175 mcg by mouth 10/10/2023 at am    melatonin 5 MG tablet Take 5 mg by mouth nightly as needed for sleep 10/9/2023    SUMAtriptan (IMITREX) 100 MG tablet TAKE 1 TABLET BY MOUTH AT ONSET OF HEADACHE AS DIRECTED Past Week    vitamin D3 (CHOLECALCIFEROL) 50 mcg (2000 units) tablet Take 1 tablet by mouth daily Past Month

## 2023-10-10 NOTE — ED TRIAGE NOTES
Pt here with c/o continued SOB, epigastric pain and near syncope since last week. Was dx with PNA and finished doxycycline, but still feeling SOB. ABC intact.

## 2023-10-10 NOTE — PROGRESS NOTES
There is severe global reduction in left ventricular systolic function on preliminary review of the echocardiogram.  The EF is in the 20% range.  I did not see measurements but the left ventricle appears to be dilated relative to the right ventricle.  There also appears to be mild to moderate mitral regurgitation which is probably functional.  Right ventricular systolic function appears to be mildly reduced.  Based upon this we will add in lisinopril 2.5 mg/day and I will also add an spironolactone 12.5 mg/day as well.  With diuresis and compensation of heart failure we will add in beta-blocker later.

## 2023-10-10 NOTE — Clinical Note
[facility] Cine(s)  injected and visualized utilizing power injector system. Ai Luna  CARDIOVASCULAR DISEASE  [address] [place] [address]  Phone: [phone]  Fax: [phone]  Follow Up Time:

## 2023-10-10 NOTE — H&P
[facility]  Internal Medicine  History and Physical      Patient Name: Rhoda Ayala MRN# [id number]   Age: 63 year old YOB: 1960     Date of Admission:10/10/2023    Primary care provider: Fili Vasquez  Date of Service: 10/10/2023         Assessment and Plan:   Rhoda Ayala is a 63 year old female with a history of Asthma, Tobacco Abuse, HTN, Hypothyroidism, Melanoma, Migraine HA who presents to the ED today with shortness of breath and midsternal chest pain/epigastric pain with exertion.     Acute CHF  NSTEMI  Bilateral Pleural Effusions  Pt presents with shortness of breath and midsternal chest pain/epigastric pain with exertion that resolves with rest.  Recently treated for Bronchitis with improvement in sputum production and cough.  No prior hx of CAD or CHF.  Most recent stress test was negative in 2013.   Patient notes several days last week of tachycardia of -130.  Patient is hemodynamically stable, afebrile on room air.  Troponin elevated 29/25, BNP 3,228, Dddimer 0.59.  EKG revealed Twave inversion in the anterolateral leads.  CT chest revealed mild bronchial wall thickening and scattered mucus plugging,  Mild cardiomegaly, Minimal coronary artery calcification with interstitial and alveolar pulmonary edema, and bilateral small pleural effusions.  - trend troponin, check lipid panel, hgb A1C  - monitor on telemetry  - echocardiogram  - monitor I/O and daily weights  - continue heparin gtt  - continue IV Lasix bid  - Cardiology consult    HTN  - hold hydrochlorothiazide for now while receiving Lasix  - pta on Amlodipine 2.5mg q72 hours.  Patient's BP is elevated.  Will have Cardiology review antihypertensive regimen       Resolving Bronchitis  Resolving Asthma/COPD Exacerbation  Pt completed Doxycycline 100mg bid x5 days, Albuterol and Prednisone 20mg daily for 5 days yesterday. Sputum production has decreased, wheezing has improved, cough has improved but she  continues to have shortness of breath related to new CHF as well as CT chest revealed Mild bronchial wall thickening and scattered mucus plugging.  - start scheduled Duonebs, prn Albuterol  - resume pta Pulmicort    Hypothyroidism   - continue Levothyroxine and add on TSH        CODE: full  Diet/IVF: npo until cardiology consult completed  DVT ppx: heparin    Lizette Vasquez MS CARMELA  Physician Assistant   Hospitalist Service           Chief Complaint:   Shortness of breath          HPI:   63 year old female with a history of Asthma, Tobacco Abuse, HTN, Hypothyroidism, Melanoma, Migraine HA who presents to the ED today with shortness of breath and midsternal chest pain/epigastric pain with exertion.     Patient reports she developed URI symptoms at the end of August with cough and congestion.  This took 3 weeks to resolve.  She then developed cough, sputum production, wheezing at the end of September. Patient was seen in the ED on 10/4/23 with a cough and congestion.  COVID was negative.  No CXR was obtained.  She was noted to have acute bronchospasm and atypical pneumonia.  She was discharged on Doxycycline 100mg bid x5 days, Albuterol and Prednisone 20mg daily for 5 days.      Patient reports her sputum production has decreased, wheezing has improved, cough has improved but she continues to have shortness of breath.  Shortness of breath is worse with exertion and improved with rest.  Over the past 3 days, she has had an upper epigastric/lower mid chest pain that is worse with exertion and improved with rest prompting her to present to the ED today.  She denies any hx of CAD.  She smokes 6 cigarettes per day.  Patient also notes that her HR last week was 120-130 for several days.         Past Medical History:     Past Medical History:   Diagnosis Date    Asthma     Cellulitis     s/p I&D of wound    Hypertension     Hypothyroidism     Lower extremity edema     Melanoma (H) 09/2015    S/P appendectomy     S/P  arthroscopic knee surgery     S/P  section     S/P lateral meniscectomy of right knee     S/P JOSEP (total abdominal hysterectomy)     Seasonal allergies           Past Surgical History:     Past Surgical History:   Procedure Laterality Date    APPENDECTOMY      COLONOSCOPY N/A 2016    Procedure: COLONOSCOPY;  Surgeon: Axel Rivera MD;  Location: [facility]    HYSTERECTOMY, PAP NO LONGER INDICATED            Social History:     Social History     Socioeconomic History    Marital status: Single     Spouse name: Not on file    Number of children: Not on file    Years of education: Not on file    Highest education level: Not on file   Occupational History    Not on file   Tobacco Use    Smoking status: Light Smoker     Packs/day: 0.25     Years: 3.00     Pack years: 0.75     Types: Cigarettes    Smokeless tobacco: Never   Substance and Sexual Activity    Alcohol use: Yes     Alcohol/week: 0.0 standard drinks of alcohol     Comment: rare use, 3-4 x per year    Drug use: No    Sexual activity: Yes     Partners: Male   Other Topics Concern    Parent/sibling w/ CABG, MI or angioplasty before 65F 55M? Not Asked   Social History Narrative    Not on file     Social Determinants of Health     Financial Resource Strain: Not on file   Food Insecurity: Not on file   Transportation Needs: Not on file   Physical Activity: Not on file   Stress: Not on file   Social Connections: Not on file   Interpersonal Safety: Not on file   Housing Stability: Not on file          Family History:     Family History   Problem Relation Age of Onset    Hypertension Father     Thyroid Disease Mother         graves disease    Arthritis Mother         Rheumatoid    Osteoporosis Mother     Colon Cancer No family hx of           Allergies:      Allergies   Allergen Reactions    Atorvastatin Muscle Pain (Myalgia)    Pravastatin      Edema, cramping    Singulair [Montelukast Sodium]      Back aches          Medications:     Prior to Admission  medications    Medication Sig Last Dose Taking? Auth Provider Long Term End Date   albuterol (PROAIR HFA/PROVENTIL HFA/VENTOLIN HFA) 108 (90 Base) MCG/ACT inhaler INHALE 2 PUFFS INTO THE LUNGS EVERY 6 HOURS AS NEEDED FOR SHORTNESS OF BREATH OR DIFFICULT BREATHING OR WHEEZING   Fili Vasquez MD Yes    albuterol (PROVENTIL) (2.5 MG/3ML) 0.083% neb solution Take 1 vial (2.5 mg) by nebulization every 6 hours as needed for shortness of breath / dyspnea or wheezing   Fili Vasquez MD Yes    amLODIPine (NORVASC) 5 MG tablet TAKE 1 TABLET(5 MG) BY MOUTH DAILY   Fili Vasquez MD Yes    budesonide (PULMICORT FLEXHALER) 180 MCG/ACT inhaler INHALE 2 PUFFS INTO THE LUNGS TWICE DAILY Due for appointment. Please schedule: [phone]   Fili Vasquez MD Yes    cetirizine (ZYRTEC) 10 MG tablet Take 10 mg by mouth daily   Reported, Patient     cyclobenzaprine (FLEXERIL) 5 MG tablet TAKE 1 TABLET(5 MG) BY MOUTH THREE TIMES DAILY AS NEEDED FOR MUSCLE SPASMS   Fili Vasquez MD     guaiFENesin (MUCINEX) 600 MG 12 hr tablet Take 1,200 mg by mouth 2 times daily as needed for congestion   Reported, Patient     hydrochlorothiazide (HYDRODIURIL) 25 MG tablet TAKE 1 TABLET BY MOUTH EVERY 48 HOURS   Fili Vasquez MD Yes    ibuprofen (ADVIL/MOTRIN) 200 MG tablet Take 400-600 mg by mouth 4 times daily as needed    Reported, Patient     Levothyroxine Sodium (LEVOTHROID PO) Take 175 mcg by mouth   Reported, Patient No    losartan (COZAAR) 100 MG tablet TAKE 1 TABLET(100 MG) BY MOUTH DAILY   Fili Vasquez MD Yes    melatonin 5 MG tablet Take 5 mg by mouth nightly as needed for sleep   Reported, Patient     SUMAtriptan (IMITREX) 100 MG tablet TAKE 1 TABLET BY MOUTH AT ONSET OF HEADACHE AS DIRECTED   Fili Vasquez MD     vitamin D3 (CHOLECALCIFEROL) 50 mcg (2000 units) tablet Take 1 tablet by mouth daily   Reported, Patient            Review of Systems:   A  "complete ROS was performed and is negative other than what is stated in the HPI.       Physical Exam:   Blood pressure (!) 151/106, pulse 92, temperature 98.1  F (36.7  C), temperature source Oral, resp. rate 18, height 1.702 m (5' 7\"), weight 93.4 kg (206 lb), SpO2 95 %, not currently breastfeeding.  General: Alert, interactive, NAD  HEENT: AT/NC  Chest/Resp: no wheezing, few coarse breath sounds bilaterally  Heart/CV: regular rate and rhythm, no murmur  Abdomen/GI: Soft, nontender, nondistended. +BS.  No rebound or guarding.  Extremities/MSK: Trace BLE edema  Skin: Warm and dry  Neuro: Alert & oriented x 3, no focal deficits, moves all extremities equally         Labs:   ROUTINE ICU LABS (Last four results)  CMP  Recent Labs   Lab 10/10/23  1014      POTASSIUM 3.7   CHLORIDE 104   CO2 25   ANIONGAP 12   *   BUN 14.4   CR 0.88   GFRESTIMATED 73   DIANNA 9.5   PROTTOTAL 6.3*   ALBUMIN 4.3   BILITOTAL 0.3   ALKPHOS 82   AST 15   ALT 30     CBC  Recent Labs   Lab 10/10/23  1014   WBC 11.6*   RBC 4.35   HGB 13.2   HCT 39.2   MCV 90   MCH 30.3   MCHC 33.7   RDW 12.7        INRNo lab results found in last 7 days.  Arterial Blood GasNo lab results found in last 7 days.       Imaging/Procedures:     Results for orders placed or performed during the hospital encounter of 10/10/23   CT Chest Pulmonary Embolism w Contrast    Narrative    CT CHEST PULMONARY EMBOLISM WITH CONTRAST 10/10/2023 11:25 AM    CLINICAL HISTORY: Shortness of breath with exertion, recent cold x2,  not improving.    TECHNIQUE: CT angiogram chest during arterial phase injection IV  contrast. 2D and 3D MIP reconstructions were performed by the CT  technologist. Dose reduction techniques were used.     CONTRAST: 68mL Isovue-370    COMPARISON: Chest radiograph 1/4/2017.    FINDINGS:  ANGIOGRAM CHEST: No acute pulmonary embolus. Thoracic aorta is  nonaneurysmal. No CT evidence of right heart strain.    LOWER NECK: Unremarkable.    LUNGS AND " PLEURA: Interstitial and alveolar pulmonary edema. No focal  consolidation. Bilateral small, right greater than left, pleural  effusions with adjacent atelectasis. No suspicious pulmonary nodule.    AIRWAYS: Mild bronchial wall thickening and scattered mucus plugging.    HEART: Mild cardiomegaly. No pericardial effusion. Minimal coronary  artery calcification.    MEDIASTINUM/AXILLAE: No lymphadenopathy. Normal esophagus. No  significant pericardial effusion.    UPPER ABDOMEN: No acute findings.    MUSCULOSKELETAL: Mild degenerative changes of the spine. No aggressive  osseous lesion.      Impression    IMPRESSION:  1.  No convincing pulmonary embolus.  2.  Findings consistent with congestive heart failure including  cardiomegaly, interstitial and alveolar pulmonary edema, and bilateral  (right greater than left) small pleural effusions.    NICOLE VASQUEZ MD         SYSTEM ID:  [id number]

## 2023-10-11 LAB
ACT BLD: 122 SECONDS (ref 74–150)
ANION GAP SERPL CALCULATED.3IONS-SCNC: 11 MMOL/L (ref 7–15)
BUN SERPL-MCNC: 14.9 MG/DL (ref 8–23)
CALCIUM SERPL-MCNC: 9.6 MG/DL (ref 8.8–10.2)
CATH EF ESTIMATED: 25 %
CHLORIDE SERPL-SCNC: 103 MMOL/L (ref 98–107)
CREAT SERPL-MCNC: 0.94 MG/DL (ref 0.51–0.95)
DEPRECATED HCO3 PLAS-SCNC: 28 MMOL/L (ref 22–29)
EGFRCR SERPLBLD CKD-EPI 2021: 67 ML/MIN/1.73M2
ERYTHROCYTE [DISTWIDTH] IN BLOOD BY AUTOMATED COUNT: 12.6 % (ref 10–15)
GLUCOSE SERPL-MCNC: 107 MG/DL (ref 70–99)
HCT VFR BLD AUTO: 40.7 % (ref 35–47)
HGB BLD-MCNC: 13.7 G/DL (ref 11.7–15.7)
MAGNESIUM SERPL-MCNC: 2.3 MG/DL (ref 1.7–2.3)
MCH RBC QN AUTO: 30.2 PG (ref 26.5–33)
MCHC RBC AUTO-ENTMCNC: 33.7 G/DL (ref 31.5–36.5)
MCV RBC AUTO: 90 FL (ref 78–100)
PLATELET # BLD AUTO: 253 10E3/UL (ref 150–450)
POTASSIUM SERPL-SCNC: 3.6 MMOL/L (ref 3.4–5.3)
RBC # BLD AUTO: 4.53 10E6/UL (ref 3.8–5.2)
SODIUM SERPL-SCNC: 142 MMOL/L (ref 135–145)
UFH PPP CHRO-ACNC: 0.17 IU/ML
UFH PPP CHRO-ACNC: 0.27 IU/ML
UFH PPP CHRO-ACNC: <0.1 IU/ML
WBC # BLD AUTO: 10.4 10E3/UL (ref 4–11)

## 2023-10-11 PROCEDURE — 94640 AIRWAY INHALATION TREATMENT: CPT | Mod: 76

## 2023-10-11 PROCEDURE — 250N000011 HC RX IP 250 OP 636: Mod: JZ | Performed by: INTERNAL MEDICINE

## 2023-10-11 PROCEDURE — 85027 COMPLETE CBC AUTOMATED: CPT | Performed by: PHYSICIAN ASSISTANT

## 2023-10-11 PROCEDURE — G0378 HOSPITAL OBSERVATION PER HR: HCPCS

## 2023-10-11 PROCEDURE — 272N000001 HC OR GENERAL SUPPLY STERILE: Performed by: INTERNAL MEDICINE

## 2023-10-11 PROCEDURE — 250N000011 HC RX IP 250 OP 636: Mod: JZ | Performed by: EMERGENCY MEDICINE

## 2023-10-11 PROCEDURE — 250N000009 HC RX 250: Performed by: INTERNAL MEDICINE

## 2023-10-11 PROCEDURE — 36415 COLL VENOUS BLD VENIPUNCTURE: CPT | Performed by: PHYSICIAN ASSISTANT

## 2023-10-11 PROCEDURE — 250N000013 HC RX MED GY IP 250 OP 250 PS 637: Performed by: INTERNAL MEDICINE

## 2023-10-11 PROCEDURE — 85520 HEPARIN ASSAY: CPT | Performed by: INTERNAL MEDICINE

## 2023-10-11 PROCEDURE — B2111ZZ FLUOROSCOPY OF MULTIPLE CORONARY ARTERIES USING LOW OSMOLAR CONTRAST: ICD-10-PCS | Performed by: INTERNAL MEDICINE

## 2023-10-11 PROCEDURE — 93458 L HRT ARTERY/VENTRICLE ANGIO: CPT | Performed by: INTERNAL MEDICINE

## 2023-10-11 PROCEDURE — 99233 SBSQ HOSP IP/OBS HIGH 50: CPT | Performed by: INTERNAL MEDICINE

## 2023-10-11 PROCEDURE — 93458 L HRT ARTERY/VENTRICLE ANGIO: CPT | Mod: 26 | Performed by: INTERNAL MEDICINE

## 2023-10-11 PROCEDURE — 99152 MOD SED SAME PHYS/QHP 5/>YRS: CPT | Performed by: INTERNAL MEDICINE

## 2023-10-11 PROCEDURE — 250N000011 HC RX IP 250 OP 636: Mod: JZ | Performed by: PHYSICIAN ASSISTANT

## 2023-10-11 PROCEDURE — 80048 BASIC METABOLIC PNL TOTAL CA: CPT | Performed by: PHYSICIAN ASSISTANT

## 2023-10-11 PROCEDURE — 4A023N7 MEASUREMENT OF CARDIAC SAMPLING AND PRESSURE, LEFT HEART, PERCUTANEOUS APPROACH: ICD-10-PCS | Performed by: INTERNAL MEDICINE

## 2023-10-11 PROCEDURE — 94640 AIRWAY INHALATION TREATMENT: CPT

## 2023-10-11 PROCEDURE — 250N000013 HC RX MED GY IP 250 OP 250 PS 637: Performed by: PHYSICIAN ASSISTANT

## 2023-10-11 PROCEDURE — 83735 ASSAY OF MAGNESIUM: CPT | Performed by: INTERNAL MEDICINE

## 2023-10-11 PROCEDURE — 96366 THER/PROPH/DIAG IV INF ADDON: CPT

## 2023-10-11 PROCEDURE — 85347 COAGULATION TIME ACTIVATED: CPT

## 2023-10-11 PROCEDURE — 99232 SBSQ HOSP IP/OBS MODERATE 35: CPT | Mod: 25 | Performed by: INTERNAL MEDICINE

## 2023-10-11 PROCEDURE — 96376 TX/PRO/DX INJ SAME DRUG ADON: CPT | Mod: 59

## 2023-10-11 PROCEDURE — B2151ZZ FLUOROSCOPY OF LEFT HEART USING LOW OSMOLAR CONTRAST: ICD-10-PCS | Performed by: INTERNAL MEDICINE

## 2023-10-11 PROCEDURE — 999N000157 HC STATISTIC RCP TIME EA 10 MIN

## 2023-10-11 PROCEDURE — 36415 COLL VENOUS BLD VENIPUNCTURE: CPT | Performed by: INTERNAL MEDICINE

## 2023-10-11 PROCEDURE — 250N000011 HC RX IP 250 OP 636: Performed by: INTERNAL MEDICINE

## 2023-10-11 PROCEDURE — 120N000001 HC R&B MED SURG/OB

## 2023-10-11 PROCEDURE — 258N000003 HC RX IP 258 OP 636: Performed by: INTERNAL MEDICINE

## 2023-10-11 RX ORDER — NALOXONE HYDROCHLORIDE 0.4 MG/ML
0.2 INJECTION, SOLUTION INTRAMUSCULAR; INTRAVENOUS; SUBCUTANEOUS
Status: ACTIVE | OUTPATIENT
Start: 2023-10-11 | End: 2023-10-11

## 2023-10-11 RX ORDER — OXYCODONE HYDROCHLORIDE 5 MG/1
10 TABLET ORAL EVERY 4 HOURS PRN
Status: DISCONTINUED | OUTPATIENT
Start: 2023-10-11 | End: 2023-10-12 | Stop reason: HOSPADM

## 2023-10-11 RX ORDER — POTASSIUM CHLORIDE 1500 MG/1
20 TABLET, EXTENDED RELEASE ORAL
Status: DISCONTINUED | OUTPATIENT
Start: 2023-10-11 | End: 2023-10-11 | Stop reason: HOSPADM

## 2023-10-11 RX ORDER — FLUMAZENIL 0.1 MG/ML
0.2 INJECTION, SOLUTION INTRAVENOUS
Status: ACTIVE | OUTPATIENT
Start: 2023-10-11 | End: 2023-10-11

## 2023-10-11 RX ORDER — LORAZEPAM 0.5 MG/1
0.5 TABLET ORAL
Status: DISCONTINUED | OUTPATIENT
Start: 2023-10-11 | End: 2023-10-11 | Stop reason: HOSPADM

## 2023-10-11 RX ORDER — NALOXONE HYDROCHLORIDE 0.4 MG/ML
0.4 INJECTION, SOLUTION INTRAMUSCULAR; INTRAVENOUS; SUBCUTANEOUS
Status: ACTIVE | OUTPATIENT
Start: 2023-10-11 | End: 2023-10-11

## 2023-10-11 RX ORDER — SODIUM CHLORIDE 9 MG/ML
75 INJECTION, SOLUTION INTRAVENOUS CONTINUOUS
Status: ACTIVE | OUTPATIENT
Start: 2023-10-11 | End: 2023-10-11

## 2023-10-11 RX ORDER — SODIUM CHLORIDE 9 MG/ML
INJECTION, SOLUTION INTRAVENOUS CONTINUOUS
Status: DISCONTINUED | OUTPATIENT
Start: 2023-10-11 | End: 2023-10-11 | Stop reason: HOSPADM

## 2023-10-11 RX ORDER — ATROPINE SULFATE 0.1 MG/ML
0.5 INJECTION INTRAVENOUS
Status: ACTIVE | OUTPATIENT
Start: 2023-10-11 | End: 2023-10-11

## 2023-10-11 RX ORDER — LIDOCAINE HYDROCHLORIDE 10 MG/ML
INJECTION, SOLUTION EPIDURAL; INFILTRATION; INTRACAUDAL; PERINEURAL
Status: DISCONTINUED
Start: 2023-10-11 | End: 2023-10-11 | Stop reason: HOSPADM

## 2023-10-11 RX ORDER — ACETAMINOPHEN 325 MG/1
650 TABLET ORAL EVERY 4 HOURS PRN
Status: DISCONTINUED | OUTPATIENT
Start: 2023-10-11 | End: 2023-10-12 | Stop reason: HOSPADM

## 2023-10-11 RX ORDER — FENTANYL CITRATE 50 UG/ML
INJECTION, SOLUTION INTRAMUSCULAR; INTRAVENOUS
Status: DISCONTINUED | OUTPATIENT
Start: 2023-10-11 | End: 2023-10-11 | Stop reason: HOSPADM

## 2023-10-11 RX ORDER — ASPIRIN 81 MG/1
243 TABLET, CHEWABLE ORAL ONCE
Status: COMPLETED | OUTPATIENT
Start: 2023-10-11 | End: 2023-10-11

## 2023-10-11 RX ORDER — FENTANYL CITRATE 50 UG/ML
INJECTION, SOLUTION INTRAMUSCULAR; INTRAVENOUS
Status: DISCONTINUED
Start: 2023-10-11 | End: 2023-10-11 | Stop reason: HOSPADM

## 2023-10-11 RX ORDER — ASPIRIN 325 MG
325 TABLET ORAL ONCE
Status: COMPLETED | OUTPATIENT
Start: 2023-10-11 | End: 2023-10-11

## 2023-10-11 RX ORDER — LIDOCAINE 40 MG/G
CREAM TOPICAL
Status: DISCONTINUED | OUTPATIENT
Start: 2023-10-11 | End: 2023-10-11 | Stop reason: HOSPADM

## 2023-10-11 RX ORDER — OXYCODONE HYDROCHLORIDE 5 MG/1
5 TABLET ORAL EVERY 4 HOURS PRN
Status: DISCONTINUED | OUTPATIENT
Start: 2023-10-11 | End: 2023-10-12 | Stop reason: HOSPADM

## 2023-10-11 RX ORDER — NITROGLYCERIN 5 MG/ML
VIAL (ML) INTRAVENOUS
Status: DISCONTINUED
Start: 2023-10-11 | End: 2023-10-11 | Stop reason: HOSPADM

## 2023-10-11 RX ORDER — LORAZEPAM 2 MG/ML
0.5 INJECTION INTRAMUSCULAR
Status: DISCONTINUED | OUTPATIENT
Start: 2023-10-11 | End: 2023-10-11 | Stop reason: HOSPADM

## 2023-10-11 RX ORDER — HEPARIN SODIUM 1000 [USP'U]/ML
INJECTION, SOLUTION INTRAVENOUS; SUBCUTANEOUS
Status: DISCONTINUED
Start: 2023-10-11 | End: 2023-10-11 | Stop reason: HOSPADM

## 2023-10-11 RX ORDER — FENTANYL CITRATE 50 UG/ML
25 INJECTION, SOLUTION INTRAMUSCULAR; INTRAVENOUS
Status: DISCONTINUED | OUTPATIENT
Start: 2023-10-11 | End: 2023-10-12 | Stop reason: HOSPADM

## 2023-10-11 RX ORDER — POTASSIUM CHLORIDE 1500 MG/1
20 TABLET, EXTENDED RELEASE ORAL ONCE
Status: COMPLETED | OUTPATIENT
Start: 2023-10-11 | End: 2023-10-11

## 2023-10-11 RX ORDER — IOPAMIDOL 755 MG/ML
INJECTION, SOLUTION INTRAVASCULAR
Status: DISCONTINUED | OUTPATIENT
Start: 2023-10-11 | End: 2023-10-11 | Stop reason: HOSPADM

## 2023-10-11 RX ADMIN — POTASSIUM CHLORIDE 20 MEQ: 1500 TABLET, EXTENDED RELEASE ORAL at 10:47

## 2023-10-11 RX ADMIN — ASPIRIN 325 MG ORAL TABLET 325 MG: 325 PILL ORAL at 08:22

## 2023-10-11 RX ADMIN — CETIRIZINE HYDROCHLORIDE 10 MG: 10 TABLET, FILM COATED ORAL at 08:23

## 2023-10-11 RX ADMIN — SPIRONOLACTONE 12.5 MG: 25 TABLET ORAL at 08:23

## 2023-10-11 RX ADMIN — ROSUVASTATIN CALCIUM 5 MG: 5 TABLET, FILM COATED ORAL at 08:23

## 2023-10-11 RX ADMIN — LORAZEPAM 0.5 MG: 0.5 TABLET ORAL at 12:36

## 2023-10-11 RX ADMIN — IPRATROPIUM BROMIDE AND ALBUTEROL SULFATE 3 ML: .5; 3 SOLUTION RESPIRATORY (INHALATION) at 17:18

## 2023-10-11 RX ADMIN — LEVOTHYROXINE SODIUM 175 MCG: 150 TABLET ORAL at 08:23

## 2023-10-11 RX ADMIN — CARVEDILOL 6.25 MG: 6.25 TABLET, FILM COATED ORAL at 08:23

## 2023-10-11 RX ADMIN — LISINOPRIL 2.5 MG: 2.5 TABLET ORAL at 08:23

## 2023-10-11 RX ADMIN — IPRATROPIUM BROMIDE AND ALBUTEROL SULFATE 3 ML: .5; 3 SOLUTION RESPIRATORY (INHALATION) at 12:15

## 2023-10-11 RX ADMIN — CARVEDILOL 6.25 MG: 6.25 TABLET, FILM COATED ORAL at 17:37

## 2023-10-11 RX ADMIN — FLUTICASONE FUROATE 1 PUFF: 100 POWDER RESPIRATORY (INHALATION) at 09:12

## 2023-10-11 RX ADMIN — HEPARIN SODIUM 1250 UNITS/HR: 10000 INJECTION, SOLUTION INTRAVENOUS at 04:07

## 2023-10-11 RX ADMIN — IPRATROPIUM BROMIDE AND ALBUTEROL SULFATE 3 ML: .5; 3 SOLUTION RESPIRATORY (INHALATION) at 20:00

## 2023-10-11 RX ADMIN — FUROSEMIDE 40 MG: 10 INJECTION, SOLUTION INTRAMUSCULAR; INTRAVENOUS at 10:47

## 2023-10-11 RX ADMIN — IPRATROPIUM BROMIDE AND ALBUTEROL SULFATE 3 ML: .5; 3 SOLUTION RESPIRATORY (INHALATION) at 09:08

## 2023-10-11 RX ADMIN — FUROSEMIDE 40 MG: 10 INJECTION, SOLUTION INTRAMUSCULAR; INTRAVENOUS at 22:48

## 2023-10-11 RX ADMIN — SODIUM CHLORIDE: 9 INJECTION, SOLUTION INTRAVENOUS at 08:26

## 2023-10-11 ASSESSMENT — ACTIVITIES OF DAILY LIVING (ADL)
ADLS_ACUITY_SCORE: 35

## 2023-10-11 NOTE — PLAN OF CARE
Goal Outcome Evaluation:      Plan of Care Reviewed With: patient    Overall Patient Progress: no changeOverall Patient Progress: no change       Pt. A&Ox4, up independently, Tele: SR with inverted T waves, short runs of PSVT vs. A.flutter run at times, coreg dose was increased on evening shift. Lung sounds diminished, productive cough at times, room air sat's at 92-94%. Pt. Remains on 2000ml fluid restriction. Heparin gtt infusing at 1250 units/hr, next Hep 10a due at 0840. Plan for angio in AM. CHF and Angio education packets given. Pt. Denies any needs at this time. Will continue with POC.

## 2023-10-11 NOTE — PROGRESS NOTES
Cross Cover    Called for frequent episodes of PSVT vs AFlutter with 2:1 conduction.  I reviewed the tele strips, typically lasting a few minutes and Asx    Admit with new CHF with EF 20-25 %  Initiated on carvedilol 3.125 mg bid by cardiology as well as lisinopril 2.5 mg and spironolactone 12.5 mg every day.  BPs still on the high side    -increased carvedilol to 6.25 mg bid, cont tele   -may need to consider switch to metop for better rate control  -cards involved

## 2023-10-11 NOTE — PRE-PROCEDURE
GENERAL PRE-PROCEDURE:   Procedure:  Heart catheterization possible intervention  Date/Time:  10/11/2023 1:55 PM    Written consent obtained?: Yes    Risks and benefits: Risks, benefits and alternatives were discussed    Consent given by:  Patient  Patient states understanding of procedure being performed: Yes    Patient's understanding of procedure matches consent: Yes    Procedure consent matches procedure scheduled: Yes    Expected level of sedation:  Moderate  Appropriately NPO:  Yes  Mallampati  :  Grade 2- soft palate, base of uvula, tonsillar pillars, and portion of posterior pharyngeal wall visible  Lungs:  Lungs clear with good breath sounds bilaterally  Heart:  Normal heart sounds and rate  History & Physical reviewed:  History and physical reviewed and no updates needed  Statement of review:  I have reviewed the lab findings, diagnostic data, medications, and the plan for sedation  Risk benefit indication for cath poss intervention discussed with pt (pt works here and is aware of procedure)  Discussed risk cva mi vasc damage, kidney, urgent surgery death  Discussed possible dapt  All questions answered she wishes to proceed

## 2023-10-11 NOTE — PROGRESS NOTES
"[facility] Cardiology Progress Note            Interval History:   Severely reduced left ventricular systolic function.  Congestive heart failure.  Chest discomfort possibly secondary to angina pectoris.  Coronary artery disease based upon the presence of coronary calcification which is mild and seen on CT of the chest.  Hypertensive initially.  Being diuresed.  For coronary angiogram today.  Awaiting labs from today.  Feels well.  Breathing significantly improved.  Runs of SVT most likely PAT.              Medications:      aspirin  325 mg Oral Once    Or    aspirin  243 mg Oral Once    aspirin  81 mg Oral Daily    carvedilol  6.25 mg Oral BID w/meals    cetirizine  10 mg Oral Daily    fluticasone  1 puff Inhalation Daily    furosemide  40 mg Intravenous Q12H    ipratropium - albuterol 0.5 mg/2.5 mg/3 mL  3 mL Nebulization 4x Daily    levothyroxine  175 mcg Oral QAM AC    lisinopril  2.5 mg Oral Daily    rosuvastatin  5 mg Oral Daily    sodium chloride (PF)  3 mL Intracatheter Q8H    spironolactone  12.5 mg Oral Daily               Physical Exam:   Blood pressure 119/69, pulse 77, temperature 98.1  F (36.7  C), temperature source Oral, resp. rate 18, height 1.702 m (5' 7\"), weight 93.4 kg (206 lb), SpO2 92%, not currently breastfeeding.  Rhythm: Sinus rhythm   Constitutional:   awake, alert, cooperative, no apparent distress, and appears stated age     Neck:   no jugular venous distension and no carotid bruits     Lungs:   no increased work of breathing and wheeze right base     Cardiovascular:   regular rate and rhythm, normal S1 and S2, S3 present, and no murmur noted            Data:        Lab Results   Component Value Date     10/10/2023     02/23/2022     12/09/2020     12/18/2019     12/21/2018     01/09/2018     07/07/2017    Lab Results   Component Value Date    CHLORIDE 104 10/10/2023    CHLORIDE 107 02/23/2022    CHLORIDE 106 12/09/2020    CHLORIDE 106 " "12/18/2019    CHLORIDE 104 12/21/2018    CHLORIDE 105 01/09/2018    CHLORIDE 106 07/07/2017    Lab Results   Component Value Date    BUN 14.4 10/10/2023    BUN 13 02/23/2022    BUN 12 12/09/2020    BUN 16 12/18/2019    BUN 14 12/21/2018    BUN 14 01/09/2018    BUN 12 07/07/2017      Lab Results   Component Value Date    POTASSIUM 3.7 10/10/2023    POTASSIUM 4.2 02/23/2022    POTASSIUM 3.9 12/09/2020    POTASSIUM 4.0 12/18/2019    POTASSIUM 3.8 12/21/2018    POTASSIUM 3.8 01/09/2018    POTASSIUM 3.7 07/07/2017    Lab Results   Component Value Date    CO2 25 10/10/2023    CO2 23 02/23/2022    CO2 27 12/09/2020    CO2 28 12/18/2019    CO2 26 12/21/2018    CO2 28 01/09/2018    CO2 27 07/07/2017    Lab Results   Component Value Date    CR 0.88 10/10/2023    CR 0.86 02/23/2022    CR 0.84 12/09/2020    CR 0.76 12/18/2019    CR 0.81 12/21/2018    CR 0.77 01/09/2018    CR 0.73 07/07/2017        Lab Results   Component Value Date    HGB 13.2 10/10/2023    HGB 14.0 02/23/2022    HGB 13.2 12/09/2020    HGB 13.8 12/18/2019    HGB 13.7 12/21/2018     Lab Results   Component Value Date     10/10/2023     02/23/2022     12/09/2020     12/18/2019     12/21/2018     No results found for: \"TROPONIN\", \"TROPI\", \"TROPR\", \"TROPN\"                Assessment and Plan:   Assessment:   Problem List  Acute systolic congestive heart failure.  Symptoms have significantly improved with medical therapy.  Cardiomyopathy with severely reduced left ventricular systolic function.  Chest discomfort associated with shortness of breath.  Possibly represents angina pectoris.  Episodes of SVT last night.  Most likely represents PAT.       Plan:   Coronary angiogram today.  Titrate beta-blockers and ACE inhibitors as blood pressure allows.       Attestation:  I have reviewed today's vital signs, notes, medications, labs and imaging.  Care coordination / counseling time: 25 minutes  Total time: 35 minutes         Parker " Curtis Claros MD, MD 10/11/2023  7:27 AM

## 2023-10-11 NOTE — PLAN OF CARE
"Problem: Plan of Care - These are the overarching goals to be used throughout the patient stay.    Goal: Plan of Care Review  Description: The Plan of Care Review/Shift note should be completed every shift.  The Outcome Evaluation is a brief statement about your assessment that the patient is improving, declining, or no change.  This information will be displayed automatically on your shift note.  Outcome: Progressing  Goal: Patient-Specific Goal (Individualized)  Description: You can add care plan individualizations to a care plan. Examples of Individualization might be:  \"Parent requests to be called daily at 9am for status\", \"I have a hard time hearing out of my right ear\", or \"Do not touch me to wake me up as it startles me\".  Outcome: Progressing  Goal: Absence of Hospital-Acquired Illness or Injury  Outcome: Progressing  Intervention: Identify and Manage Fall Risk  Recent Flowsheet Documentation  Taken 10/11/2023 1522 by Arnaldo Jacobs RN  Safety Promotion/Fall Prevention: safety round/check completed  Taken 10/11/2023 1026 by Arnaldo Jacobs RN  Safety Promotion/Fall Prevention: safety round/check completed  Goal: Optimal Comfort and Wellbeing  Outcome: Progressing  Goal: Readiness for Transition of Care  Outcome: Progressing     Goal Outcome Evaluation:  Pt progress well with Angio. Site:  Dry, Clean and Intact.        Temp: 98.1  F (36.7  C) Temp src: Oral BP: 121/59 Pulse: 76   Resp: 18 SpO2: 96 % O2 Device: None (Room air) Oxygen Delivery: 4 LPM       Orientation:  A&O x4  VS: VSS  Pain:  Pain denies.  Tele:  SR  Activity:  Independent  Resp:  RA with intermittent oxygen use.    GI:  Denies nausea and vomiting. Ate dinner.  : Voiding without difficulty  Skin:  WDL  Lines: PIV infusing  Diet: Regular  Labs:  Potassium and Mag active  Plan: Tomorrow?  Discharge:  TBD         "

## 2023-10-11 NOTE — PROVIDER NOTIFICATION
Pt. has had 4 runs of what appears to be a.flutter on tele that lasts from 1 to 5 minutes, in the last hour, has happened while she was up to the restroom and also happening while she is resting in bed. Pt. denies any palpitations or chest pain. Magnesium was just replaced. MD paged.

## 2023-10-11 NOTE — PROGRESS NOTES
[facility]    Medicine Progress Note - Hospitalist Service    Date of Admission:  10/10/2023    Assessment & Plan     Rhoda Ayala is a 63 year old female with a history of Asthma, Tobacco Abuse, HTN, Hypothyroidism, Melanoma, Migraine HA who presents to the ED today with shortness of breath and midsternal chest pain/epigastric pain with exertion.  Found to have acute CHF and small troponin elevation.    Acute congestive heart failure with reduced ejection fraction.  Non-ST elevation myocardial infarction.  Hypertension.  Hyperlipidemia.  Paroxysmal supraventricular tachycardia.  -Appreciate cardiology input.  -Planning for coronary angiogram today, 10/11.  -Continue furosemide 40 mg IV every 12 hours.  -Weigh daily.  -Strict intake and output monitoring.  -Continue heparin drip.  -Continue aspirin.  -Continue rosuvastatin 5 mg a day.  -Continue carvedilol 6.25 mg twice a day.  -Continue lisinopril 2.5 mg a day.  -Continue spironolactone 12.5 mg a day.  -Monitor on telemetry.    Hypothyroidism.  -Continue levothyroxine 175 mcg a day.    Asthma.  -Continue inhaled fluticasone daily.  -Continue DuoNebs 4 times a day.  -Additional albuterol if needed.    Hypomagnesemia.  -Magnesium replacement protocol.          Diet: Fluid restriction 2000 ML FLUID  NPO for Medical/Clinical Reasons Except for: Meds  NPO per Anesthesia Guidelines for Procedure/Surgery Except for: Meds    DVT Prophylaxis: Heparin   Ortez Catheter: Not present  Lines: None     Cardiac Monitoring: ACTIVE order. Indication: Acute decompensated heart failure (48 hours)  Code Status: Full Code      Clinically Significant Risk Factors            # Hypomagnesemia: Lowest Mg = 1.6 mg/dL in last 2 days, will replace as needed       # Hypertension: Noted on problem list  # Acute heart failure with reduced ejection fraction: last echo with EF <40% and receiving IV diuretics       # Obesity: Estimated body mass index is 32.26 kg/m  as  "calculated from the following:    Height as of this encounter: 1.702 m (5' 7\").    Weight as of this encounter: 93.4 kg (206 lb)., PRESENT ON ADMISSION     # Asthma: noted on problem list        Disposition Plan     Expected Discharge Date: 10/12/2023                    Jong Castro DO  Hospitalist Service  [facility]  Securely message with [employer] (more info)  Text page via [employer] Paging/Directory   ______________________________________________________________________    Interval History   Shortness of breath improved.  No chest pain this morning.  Notices palpitations at times.  Denies fevers, chills, nausea, or vomiting.    Physical Exam   Vital Signs: Temp: 98.1  F (36.7  C) Temp src: Oral BP: (!) 141/91 Pulse: 63   Resp: 18 SpO2: 93 % O2 Device: None (Room air)    Weight: 206 lbs 0 oz    Gen:  NAD, A&Ox3.  Eyes:  PERRL, sclera anicteric.  OP:  MMM, no lesions.  Neck:  Supple.  CV: Currently regular, no murmurs.  Lung:  CTA b/l, normal effort.  Ab:  +BS, soft.  Skin:  Warm, dry to touch.  No rash.  Ext:  No pitting edema LE b/l.        45 MINUTES SPENT BY ME on the date of service doing chart review, history, exam, documentation & further activities per the note.      Data     I have personally reviewed the following data over the past 24 hrs:    10.4  \   13.7   / 253     142 103 14.9 /  107 (H)   3.6 28 0.94 \     TSH: N/A T4: N/A A1C: N/A       Imaging results reviewed over the past 24 hrs:   Recent Results (from the past 24 hour(s))   Echocardiogram Complete   Result Value    LVEF  20-25%    Narrative    [id number]  [id number]  [id number]  [id number]^DIALLO^CATHI^S     [facility]  Echocardiography Laboratory  [address] [place] [address]     Name: RENAE DOMINGUEZ  MRN: [id number]  : 1960  Study Date: 10/10/2023 02:01 PM  Age: 63 yrs  Gender: Female  Patient Location: [facility]  Reason For Study: CHF  Ordering Physician: CATHI LANDRUM  Referring " Physician: Fili Vasquez MD  Performed By: Valentina Herrera [facility]     BSA: 2.1 m2  Height: 67 in  Weight: 208 lb  HR: 86  BP: 159/116 mmHg  ______________________________________________________________________________  Procedure  Complete Portable Echo Adult. Optison (NDC #[id number]) given intravenously.  ______________________________________________________________________________  Interpretation Summary     Severely decreased left ventricular systolic function  The visual ejection fraction is 20-25%.  The left ventricle is moderately dilated.  There is normal left ventricular wall thickness.  There is severe global hypokinesia of the left ventricle.  The right ventricle is normal in structure, function and size.  The left atrium is moderately dilated.  There is moderate (2+) mitral regurgitation.  The ascending aorta is Mildly dilated.     Since the last ( pre stress echocardiogram) study 7/8/2013 there has been  interim devlopment of severe global LV dysfunction.  ______________________________________________________________________________  Left Ventricle  The left ventricle is moderately dilated. There is normal left ventricular  wall thickness. Severely decreased left ventricular systolic function. The  visual ejection fraction is 20-25%. Grade I or early diastolic dysfunction.  There is severe global hypokinesia of the left ventricle. There is no thrombus  seen in the left ventricle.     Right Ventricle  The right ventricle is normal in structure, function and size. There is no  mass or thrombus in the right ventricle.     Atria  The left atrium is moderately dilated. Right atrial size is normal. There is  no atrial shunt seen. The left atrial appendage is not well visualized.     Mitral Valve  The mitral valve leaflets appear normal. There is no evidence of stenosis,  fluttering, or prolapse. There is moderate (2+) mitral regurgitation. There is  no mitral valve stenosis.     Tricuspid  Valve  Normal tricuspid valve. There is mild (1+) tricuspid regurgitation. The right  ventricular systolic pressure is approximated at 20.9 mmHg plus the right  atrial pressure. There is no tricuspid stenosis.     Aortic Valve  There is moderate trileaflet aortic sclerosis. No aortic regurgitation is  present. No aortic stenosis is present.     Pulmonic Valve  The pulmonic valve is not well visualized. There is no pulmonic valvular  regurgitation. Increased pulmonic valve velocity.     Vessels  The aortic root is normal size. The ascending aorta is Mildly dilated.  Dilation of the inferior vena cava is present with normal respiratory  variation in diameter. The pulmonary artery is normal size.     Pericardium  The pericardium appears normal. There is no pleural effusion.     Rhythm  Sinus rhythm was noted.  ______________________________________________________________________________  MMode/2D Measurements & Calculations  IVC diam: 2.3 cm     Ao root diam: 3.4 cm  LA dimension: 4.7 cm  LA/Ao: 1.4  LVOT diam: 2.4 cm  LVOT area: 4.7 cm2  Ao root diam index Ht(cm/m): 2.0  Ao root diam index BSA (cm/m2): 1.6  LA Volume (BP): 111.0 ml  LA Volume Index (BP): 53.9 ml/m2  RV Base: 4.1 cm  TAPSE: 2.0 cm     Doppler Measurements & Calculations  MV E max blair: 77.1 cm/sec  MV A max blair: 60.6 cm/sec  MV E/A: 1.3  MV max P.4 mmHg  MV mean P.2 mmHg  MV V2 VTI: 26.0 cm  MVA(VTI): 2.3 cm2  MV P1/2t max blair: 105.2 cm/sec  MV P1/2t: 100.7 msec  MVA(P1/2t): 2.2 cm2  MV dec slope: 306.0 cm/sec2  MV dec time: 0.17 sec  Ao V2 max: 152.7 cm/sec  Ao max P.3 mmHg  Ao V2 mean: 104.9 cm/sec  Ao mean P.0 mmHg  Ao V2 VTI: 27.3 cm  GABRIELLE(I,D): 2.2 cm2  GABRIELLE(V,D): 2.5 cm2  LV V1 max P.6 mmHg  LV V1 max: 80.5 cm/sec  LV V1 VTI: 13.1 cm  MR PISA: 2.4 cm2  MR ERO: 0.13 cm2  MR volume: 23.8 ml  SV(LVOT): 60.9 ml  SI(LVOT): 29.6 ml/m2  PA acc time: 0.13 sec  TR max blair: 228.5 cm/sec  TR max P.9 mmHg  AV Blair Ratio (DI): 0.53  GABRIELLE  Index (cm2/m2): 1.1  E/E' avg: 15.8  Lateral E/e': 13.9  Medial E/e': 17.7  RV S Blair: 12.9 cm/sec     ______________________________________________________________________________  Report approved by: Dr. Tyler Schumacher 10/10/2023 03:31 PM

## 2023-10-11 NOTE — PROGRESS NOTES
Patient Transfer Information  Patient connected to monitoring equipment on arrival: N/A     Patient connected to wall oxygen on arrival: Yes    Belongings: No belongings present    Safety check completed: Yes

## 2023-10-12 ENCOUNTER — APPOINTMENT (OUTPATIENT)
Dept: CARDIOLOGY | Facility: CLINIC | Age: 63
DRG: 280 | End: 2023-10-12
Attending: INTERNAL MEDICINE
Payer: COMMERCIAL

## 2023-10-12 VITALS
RESPIRATION RATE: 18 BRPM | WEIGHT: 201.2 LBS | TEMPERATURE: 98.3 F | HEIGHT: 67 IN | BODY MASS INDEX: 31.58 KG/M2 | OXYGEN SATURATION: 96 % | DIASTOLIC BLOOD PRESSURE: 57 MMHG | SYSTOLIC BLOOD PRESSURE: 115 MMHG | HEART RATE: 68 BPM

## 2023-10-12 LAB
ANION GAP SERPL CALCULATED.3IONS-SCNC: 12 MMOL/L (ref 7–15)
ATRIAL RATE - MUSE: 66 BPM
BUN SERPL-MCNC: 16.4 MG/DL (ref 8–23)
CALCIUM SERPL-MCNC: 9.7 MG/DL (ref 8.8–10.2)
CHLORIDE SERPL-SCNC: 99 MMOL/L (ref 98–107)
CREAT SERPL-MCNC: 0.99 MG/DL (ref 0.51–0.95)
DEPRECATED HCO3 PLAS-SCNC: 28 MMOL/L (ref 22–29)
DIASTOLIC BLOOD PRESSURE - MUSE: NORMAL MMHG
EGFRCR SERPLBLD CKD-EPI 2021: 63 ML/MIN/1.73M2
GLUCOSE SERPL-MCNC: 96 MG/DL (ref 70–99)
INTERPRETATION ECG - MUSE: NORMAL
MAGNESIUM SERPL-MCNC: 2.1 MG/DL (ref 1.7–2.3)
P AXIS - MUSE: 62 DEGREES
POTASSIUM SERPL-SCNC: 3.5 MMOL/L (ref 3.4–5.3)
PR INTERVAL - MUSE: 150 MS
QRS DURATION - MUSE: 94 MS
QT - MUSE: 440 MS
QTC - MUSE: 461 MS
R AXIS - MUSE: -15 DEGREES
SODIUM SERPL-SCNC: 139 MMOL/L (ref 135–145)
SYSTOLIC BLOOD PRESSURE - MUSE: NORMAL MMHG
T AXIS - MUSE: 129 DEGREES
VENTRICULAR RATE- MUSE: 66 BPM

## 2023-10-12 PROCEDURE — 250N000013 HC RX MED GY IP 250 OP 250 PS 637: Performed by: INTERNAL MEDICINE

## 2023-10-12 PROCEDURE — 250N000009 HC RX 250: Performed by: PHYSICIAN ASSISTANT

## 2023-10-12 PROCEDURE — 94640 AIRWAY INHALATION TREATMENT: CPT

## 2023-10-12 PROCEDURE — 250N000009 HC RX 250: Performed by: INTERNAL MEDICINE

## 2023-10-12 PROCEDURE — 99233 SBSQ HOSP IP/OBS HIGH 50: CPT | Mod: 25 | Performed by: INTERNAL MEDICINE

## 2023-10-12 PROCEDURE — 94799 UNLISTED PULMONARY SVC/PX: CPT

## 2023-10-12 PROCEDURE — 36415 COLL VENOUS BLD VENIPUNCTURE: CPT | Performed by: INTERNAL MEDICINE

## 2023-10-12 PROCEDURE — 94640 AIRWAY INHALATION TREATMENT: CPT | Mod: 76

## 2023-10-12 PROCEDURE — 83735 ASSAY OF MAGNESIUM: CPT | Performed by: INTERNAL MEDICINE

## 2023-10-12 PROCEDURE — 99239 HOSP IP/OBS DSCHRG MGMT >30: CPT | Performed by: INTERNAL MEDICINE

## 2023-10-12 PROCEDURE — 250N000013 HC RX MED GY IP 250 OP 250 PS 637: Performed by: PHYSICIAN ASSISTANT

## 2023-10-12 PROCEDURE — 93272 ECG/REVIEW INTERPRET ONLY: CPT | Performed by: INTERNAL MEDICINE

## 2023-10-12 PROCEDURE — 93270 REMOTE 30 DAY ECG REV/REPORT: CPT

## 2023-10-12 PROCEDURE — G0378 HOSPITAL OBSERVATION PER HR: HCPCS

## 2023-10-12 PROCEDURE — 999N000157 HC STATISTIC RCP TIME EA 10 MIN

## 2023-10-12 PROCEDURE — 93010 ELECTROCARDIOGRAM REPORT: CPT | Performed by: INTERNAL MEDICINE

## 2023-10-12 PROCEDURE — 80048 BASIC METABOLIC PNL TOTAL CA: CPT | Performed by: INTERNAL MEDICINE

## 2023-10-12 RX ORDER — SPIRONOLACTONE 25 MG/1
12.5 TABLET ORAL DAILY
Qty: 15 TABLET | Refills: 0 | Status: SHIPPED | OUTPATIENT
Start: 2023-10-13 | End: 2023-10-24

## 2023-10-12 RX ORDER — FUROSEMIDE 40 MG
40 TABLET ORAL DAILY
Status: DISCONTINUED | OUTPATIENT
Start: 2023-10-12 | End: 2023-10-12 | Stop reason: HOSPADM

## 2023-10-12 RX ORDER — ROSUVASTATIN CALCIUM 5 MG/1
5 TABLET, COATED ORAL DAILY
Qty: 30 TABLET | Refills: 0 | Status: SHIPPED | OUTPATIENT
Start: 2023-10-13 | End: 2023-10-24

## 2023-10-12 RX ORDER — LEVALBUTEROL INHALATION SOLUTION 0.63 MG/3ML
0.63 SOLUTION RESPIRATORY (INHALATION) 4 TIMES DAILY
Status: DISCONTINUED | OUTPATIENT
Start: 2023-10-12 | End: 2023-10-12 | Stop reason: HOSPADM

## 2023-10-12 RX ORDER — LEVALBUTEROL INHALATION SOLUTION 0.63 MG/3ML
1 SOLUTION RESPIRATORY (INHALATION) EVERY 6 HOURS PRN
Qty: 90 ML | Refills: 0 | Status: SHIPPED | OUTPATIENT
Start: 2023-10-12 | End: 2023-12-05

## 2023-10-12 RX ORDER — CARVEDILOL 6.25 MG/1
6.25 TABLET ORAL 2 TIMES DAILY WITH MEALS
Qty: 60 TABLET | Refills: 0 | Status: SHIPPED | OUTPATIENT
Start: 2023-10-12 | End: 2023-10-24

## 2023-10-12 RX ORDER — POTASSIUM CHLORIDE 1500 MG/1
20 TABLET, EXTENDED RELEASE ORAL ONCE
Status: COMPLETED | OUTPATIENT
Start: 2023-10-12 | End: 2023-10-12

## 2023-10-12 RX ORDER — FUROSEMIDE 40 MG
40 TABLET ORAL DAILY
Qty: 30 TABLET | Refills: 0 | Status: SHIPPED | OUTPATIENT
Start: 2023-10-13 | End: 2023-10-24

## 2023-10-12 RX ORDER — LISINOPRIL 2.5 MG/1
2.5 TABLET ORAL DAILY
Qty: 30 TABLET | Refills: 0 | Status: SHIPPED | OUTPATIENT
Start: 2023-10-13 | End: 2023-10-24

## 2023-10-12 RX ORDER — ASPIRIN 81 MG/1
81 TABLET ORAL DAILY
Qty: 30 TABLET | Refills: 0 | Status: SHIPPED | OUTPATIENT
Start: 2023-10-13 | End: 2023-12-05

## 2023-10-12 RX ADMIN — LEVALBUTEROL HYDROCHLORIDE 0.63 MG: 0.63 SOLUTION RESPIRATORY (INHALATION) at 12:52

## 2023-10-12 RX ADMIN — ASPIRIN 81 MG: 81 TABLET, COATED ORAL at 08:23

## 2023-10-12 RX ADMIN — ROSUVASTATIN CALCIUM 5 MG: 5 TABLET, FILM COATED ORAL at 08:24

## 2023-10-12 RX ADMIN — IPRATROPIUM BROMIDE AND ALBUTEROL SULFATE 3 ML: .5; 3 SOLUTION RESPIRATORY (INHALATION) at 08:17

## 2023-10-12 RX ADMIN — FLUTICASONE FUROATE 1 PUFF: 100 POWDER RESPIRATORY (INHALATION) at 08:17

## 2023-10-12 RX ADMIN — SPIRONOLACTONE 12.5 MG: 25 TABLET ORAL at 08:24

## 2023-10-12 RX ADMIN — POTASSIUM CHLORIDE 20 MEQ: 1500 TABLET, EXTENDED RELEASE ORAL at 08:23

## 2023-10-12 RX ADMIN — ALBUTEROL SULFATE 2.5 MG: 2.5 SOLUTION RESPIRATORY (INHALATION) at 06:17

## 2023-10-12 RX ADMIN — LISINOPRIL 2.5 MG: 2.5 TABLET ORAL at 08:24

## 2023-10-12 RX ADMIN — LEVOTHYROXINE SODIUM 175 MCG: 150 TABLET ORAL at 08:23

## 2023-10-12 RX ADMIN — FUROSEMIDE 40 MG: 40 TABLET ORAL at 10:41

## 2023-10-12 RX ADMIN — CETIRIZINE HYDROCHLORIDE 10 MG: 10 TABLET, FILM COATED ORAL at 08:24

## 2023-10-12 RX ADMIN — IPRATROPIUM BROMIDE 0.5 MG: 0.5 SOLUTION RESPIRATORY (INHALATION) at 12:52

## 2023-10-12 RX ADMIN — CARVEDILOL 6.25 MG: 6.25 TABLET, FILM COATED ORAL at 08:24

## 2023-10-12 ASSESSMENT — ACTIVITIES OF DAILY LIVING (ADL)
ADLS_ACUITY_SCORE: 35

## 2023-10-12 NOTE — PLAN OF CARE
Goal Outcome Evaluation:      Plan of Care Reviewed With: patient      Shift: 7pm-7am    Vitals: Temp: 98.6  F (37  C) Temp src: Oral BP: 116/72 Pulse: 69   Resp: 18 SpO2: 93 % O2 Device: Oxymask Oxygen Delivery: 4 LPM    Orientation: alert  Pain: denies  Tele: SR w/ inverted T w/ PVC w/ prolonged QT/Qtc  - 13 runs of PAT  Activity: independent   Resp: diminished lung sounds, oxy mask for comfort, PRN neb given   Diet: regular diet   How to take meds: whole w/ fluids   GI & : continent of bladder, no bm this shift   Protocol: magnesium and potassium   Skin: right femoral site- clean dry and intact   Lines: right IV, saline locked

## 2023-10-12 NOTE — PROVIDER NOTIFICATION
"MD paged:  \"FYI: Pt has had a few spikes of tachycardia. At least 120-140s/ Sustaining at least 30 seconds. \"  "

## 2023-10-12 NOTE — PROGRESS NOTES
"[facility] Cardiology Progress Note            Interval History:   Idiopathic cardiomyopathy.  Severely reduced left ventricular systolic function.  Coronary angiography showed only trivial to mild nonobstructive coronary artery disease.  Appears to be euvolemic now and asymptomatic.  Was having episodes of SVT.  Noted by interventional list that patient was having SVT during the case which was aborted by movement of the catheter against the septum indicating possibly an AVNRT as the rhythm appears to use the AV node.  Doubt that incessant AVNRT is the cause of the cardiomyopathy but it would be low on the differential diagnosis.  Feels much better today.  Tolerating low-dose rosuvastatin.              Medications:      aspirin  81 mg Oral Daily    carvedilol  6.25 mg Oral BID w/meals    cetirizine  10 mg Oral Daily    fluticasone  1 puff Inhalation Daily    furosemide  40 mg Oral Daily    ipratropium  0.5 mg Nebulization 4x daily    levalbuterol  0.63 mg Nebulization 4x Daily    levothyroxine  175 mcg Oral QAM AC    lisinopril  2.5 mg Oral Daily    rosuvastatin  5 mg Oral Daily    spironolactone  12.5 mg Oral Daily               Physical Exam:   Blood pressure 115/57, pulse 77, temperature 98.3  F (36.8  C), temperature source Oral, resp. rate 18, height 1.702 m (5' 7\"), weight 91.3 kg (201 lb 3.2 oz), SpO2 95%, not currently breastfeeding.  Rhythm: Sinus rhythm   Constitutional:   awake, alert, cooperative, no apparent distress, and appears stated age     Neck:   no jugular venous distension and no carotid bruits     Lungs:   No increased work of breathing, good air exchange, clear to auscultation bilaterally, no crackles or wheezing     Cardiovascular:   regular rate and rhythm, normal S1 and S2, S3 present, no murmur noted, and no edema            Data:        Lab Results   Component Value Date     10/12/2023     10/11/2023     10/10/2023     12/09/2020     12/18/2019    NA " "139 12/21/2018    Lab Results   Component Value Date    CHLORIDE 99 10/12/2023    CHLORIDE 103 10/11/2023    CHLORIDE 104 10/10/2023    CHLORIDE 107 02/23/2022    CHLORIDE 106 12/09/2020    CHLORIDE 106 12/18/2019    CHLORIDE 104 12/21/2018    Lab Results   Component Value Date    BUN 16.4 10/12/2023    BUN 14.9 10/11/2023    BUN 14.4 10/10/2023    BUN 13 02/23/2022    BUN 12 12/09/2020    BUN 16 12/18/2019    BUN 14 12/21/2018      Lab Results   Component Value Date    POTASSIUM 3.5 10/12/2023    POTASSIUM 3.6 10/11/2023    POTASSIUM 3.7 10/10/2023    POTASSIUM 4.2 02/23/2022    POTASSIUM 3.9 12/09/2020    POTASSIUM 4.0 12/18/2019    POTASSIUM 3.8 12/21/2018    Lab Results   Component Value Date    CO2 28 10/12/2023    CO2 28 10/11/2023    CO2 25 10/10/2023    CO2 23 02/23/2022    CO2 27 12/09/2020    CO2 28 12/18/2019    CO2 26 12/21/2018    Lab Results   Component Value Date    CR 0.99 10/12/2023    CR 0.94 10/11/2023    CR 0.88 10/10/2023    CR 0.84 12/09/2020    CR 0.76 12/18/2019    CR 0.81 12/21/2018        Lab Results   Component Value Date     10/11/2023     10/10/2023     02/23/2022     No results found for: \"TROPONIN\", \"TROPI\", \"TROPR\", \"TROPN\"                Assessment and Plan:   Assessment:   Problem List  Idiopathic cardiomyopathy.  Trivial coronary artery disease.  SVT.  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  Tobacco abuse.  Acute systolic congestive heart failure.  Patient appears to be euvolemic.       Plan:   1.  Patient appears okay for discharge.  2.  I have arranged follow-up with an DARYL in 2 weeks time for heart failure and titration of medications and review of basic metabolic profile, electrophysiology with respect to the AVNRT.  Echocardiogram in 3 months time and follow-up with me in 3 months time.  3.  Event monitor before discharge.  4.  We will stop intravenous Lasix and start oral furosemide 40 mg/day.    We will sign off at this stage but please call if any cardiac questions.     "       Attestation:  I have reviewed today's vital signs, notes, medications, labs and imaging.  Care coordination / counseling time: 35 minutes  Total time: 45 minutes         Parker Claros MD, MD 10/12/2023  8:54 AM

## 2023-10-12 NOTE — DISCHARGE SUMMARY
"[facility]  Hospitalist Discharge Summary      Date of Admission:  10/10/2023  Date of Discharge:  10/12/2023  Discharging Provider: Jong Castro DO  Discharge Service: Hospitalist Service    Discharge Diagnoses   Idiopathic cardiomyopathy.  Paroxysmal supraventricular tachycardia.  Tobacco use disorder.  Acute exacerbation of heart failure with reduced ejection fraction.  Type II myocardial infarction due to demand ischemia.  Hypertension.  Hyperlipidemia.  Hypothyroidism.  Asthma.  Hypomagnesemia.      Clinically Significant Risk Factors     # Obesity: Estimated body mass index is 31.51 kg/m  as calculated from the following:    Height as of this encounter: 1.702 m (5' 7\").    Weight as of this encounter: 91.3 kg (201 lb 3.2 oz).       Follow-ups Needed After Discharge   Follow-up Appointments     Follow-up and recommended labs and tests       Follow-up with cardiology per their orders.            Discharge Disposition   Discharged to home  Condition at discharge: Stable    Hospital Course     Rhoda Ayala is a 63 year old female with a history of Asthma, Tobacco Abuse, HTN, Hypothyroidism, Melanoma, Migraine HA who presents to the ED today with shortness of breath and midsternal chest pain/epigastric pain with exertion.  Found to have acute CHF and small troponin elevation.  Initially started on IV furosemide.  Volume status is improved during hospital stay with diuresis.  She has now been transitioned to oral furosemide 40 mg a day.  She was seen in consultation by cardiology.  Did have coronary angiogram done on 10/11.  Noted to have trivial coronary atherosclerosis.  No need for intervention.  She has continued to have episodes of paroxysmal supraventricular tachycardia during hospital stay.  These are improving, but still present on day of discharge.  There is question of possible AV elizabeth reentry tachycardia.  Cardiology is planning to monitor with an outpatient cardiac monitor " initially.  Have her follow-up with electrophysiology specialist in addition to general cardiology within the next few weeks.  Previous albuterol has been switched to Xopenex nebs to try to minimize because of PSVT.  Follow-up with both general and EP cardiology within the next 3 weeks.    Consultations This Hospital Stay   CARDIOLOGY IP CONSULT  PHARMACY IP CONSULT  PHARMACY IP CONSULT  PHARMACY IP CONSULT  SMOKING CESSATION PROGRAM IP CONSULT    Code Status   Full Code    Time Spent on this Encounter   I spent 35 minutes with Ms. Ayala and working on discharge on 10/12/2023.       J[facility] Castro[facility] MEDICAL SURGICAL  [address] [facility] [address]  Phone: [phone]  Fax: [phone]  ______________________________________________________________________    Physical Exam   Vital Signs: Temp: 98.3  F (36.8  C) Temp src: Oral BP: 115/57 Pulse: 77   Resp: 18 SpO2: 95 % O2 Device: None (Room air) Oxygen Delivery: 4 LPM  Weight: 201 lbs 3.2 oz  Gen:  NAD, A&Ox3.  Eyes:  PERRL, sclera anicteric.  OP:  MMM, no lesions.  Neck:  Supple.  CV:  Regular, no loud murmurs.  Lung:  CTA b/l, normal effort.  Ab:  +BS, soft.  Skin:  Warm, dry to touch.  No rash.  Ext:  No pitting edema LE b/l.         Primary Care Physician   Fili Vasquez MD    Discharge Orders      Basic metabolic panel     Follow-Up with Cardiology DARYL      Follow-Up with Cardiology      Follow-Up with Cardiology EP      Reason for your hospital stay    Paroxysmal supraventricular tachycardia, new cardiomyopathy.     Follow-up and recommended labs and tests     Follow-up with cardiology per their orders.     Activity    Your activity upon discharge: activity as tolerated     Echocardiogram Complete    Administration of IV contrast will be tailored to this examination per the appropriate written protocol listed in the Echocardiography department Protocol Book, or by the supervising Cardiologist. This may  result in an order change.    Use of contrast is at the discretion of the supervising Cardiologist.     Echocardiogram Complete    Administration of IV contrast will be tailored to this examination per the appropriate written protocol listed in the Echocardiography department Protocol Book, or by the supervising Cardiologist. This may result in an order change.    Use of contrast is at the discretion of the supervising Cardiologist.     Diet    Follow this diet upon discharge: Regular         Discharge Medications   Current Discharge Medication List        START taking these medications    Details   aspirin 81 MG EC tablet Take 1 tablet (81 mg) by mouth daily  Qty: 30 tablet, Refills: 0    Associated Diagnoses: Acute systolic congestive heart failure (H)      carvedilol (COREG) 6.25 MG tablet Take 1 tablet (6.25 mg) by mouth 2 times daily (with meals) for 30 days  Qty: 60 tablet, Refills: 0    Associated Diagnoses: Acute systolic congestive heart failure (H)      furosemide (LASIX) 40 MG tablet Take 1 tablet (40 mg) by mouth daily for 30 days  Qty: 30 tablet, Refills: 0    Associated Diagnoses: Acute systolic congestive heart failure (H)      levalbuterol (XOPENEX) 0.63 MG/3ML neb solution Take 3 mLs (0.63 mg) by nebulization every 6 hours as needed for shortness of breath or wheezing  Qty: 90 mL, Refills: 0    Associated Diagnoses: Acute systolic congestive heart failure (H)      lisinopril (ZESTRIL) 2.5 MG tablet Take 1 tablet (2.5 mg) by mouth daily for 30 days  Qty: 30 tablet, Refills: 0    Associated Diagnoses: Acute systolic congestive heart failure (H)      rosuvastatin (CRESTOR) 5 MG tablet Take 1 tablet (5 mg) by mouth daily for 30 days  Qty: 30 tablet, Refills: 0    Associated Diagnoses: Acute systolic congestive heart failure (H)      spironolactone (ALDACTONE) 25 MG tablet Take 0.5 tablets (12.5 mg) by mouth daily for 30 days  Qty: 15 tablet, Refills: 0    Associated Diagnoses: Acute systolic congestive  heart failure (H)           CONTINUE these medications which have NOT CHANGED    Details   budesonide (PULMICORT FLEXHALER) 180 MCG/ACT inhaler INHALE 2 PUFFS INTO THE LUNGS TWICE DAILY Due for appointment. Please schedule: [phone]  Qty: 3 Inhaler, Refills: 1    Associated Diagnoses: Extrinsic asthma, mild persistent, with acute exacerbation      cetirizine (ZYRTEC) 10 MG tablet Take 10 mg by mouth daily      cyclobenzaprine (FLEXERIL) 5 MG tablet TAKE 1 TABLET(5 MG) BY MOUTH THREE TIMES DAILY AS NEEDED FOR MUSCLE SPASMS  Qty: 42 tablet, Refills: 11    Associated Diagnoses: Arm pain, left; Upper back pain on right side      guaiFENesin (MUCINEX) 600 MG 12 hr tablet Take 1,200 mg by mouth 2 times daily as needed for congestion      ibuprofen (ADVIL/MOTRIN) 200 MG tablet Take 400-600 mg by mouth 4 times daily as needed       Levothyroxine Sodium (LEVOTHROID PO) Take 175 mcg by mouth daily      melatonin 5 MG tablet Take 5 mg by mouth nightly as needed for sleep      SUMAtriptan (IMITREX) 100 MG tablet TAKE 1 TABLET BY MOUTH AT ONSET OF HEADACHE AS DIRECTED  Qty: 9 tablet, Refills: 11    Associated Diagnoses: Migraine without aura and without status migrainosus, not intractable      vitamin D3 (CHOLECALCIFEROL) 50 mcg (2000 units) tablet Take 1 tablet by mouth daily           STOP taking these medications       albuterol (PROAIR HFA/PROVENTIL HFA/VENTOLIN HFA) 108 (90 Base) MCG/ACT inhaler Comments:   Reason for Stopping:         albuterol (PROVENTIL) (2.5 MG/3ML) 0.083% neb solution Comments:   Reason for Stopping:         amLODIPine (NORVASC) 5 MG tablet Comments:   Reason for Stopping:         hydrochlorothiazide (HYDRODIURIL) 25 MG tablet Comments:   Reason for Stopping:             Allergies   Allergies   Allergen Reactions    Atorvastatin Muscle Pain (Myalgia)    Pravastatin      Edema, cramping    Singulair [Montelukast Sodium]      Back aches

## 2023-10-12 NOTE — PROGRESS NOTES
"Cross cover    I was called by the nurse and notified about episodes of apparent tachycardia.  I note that Dr. Leobardo Figueredo indicated these occurred last night and are probably PAT.  In addition, Dr. Auguste also noticed them while the patient was undergoing coronary angiography.  His sense is that this may be an AV elizabeth reentry tachycardia and \"may be the cause of the patient's dilated cardiomyopathy\".    If we run into sustained tachycardia, will try Adenosine.    KP      "

## 2023-10-12 NOTE — PROGRESS NOTES
Pt having runs of HR with max rate 130s.  Pt asymptomatic.  MD aware.   EKG done but not in rhythm when done.  BP stable.  Pt will go home with Holter monitor.   Reviewed AVS  and all new meds with patient.  Pt has no questions.  Will drive self home.

## 2023-10-13 ENCOUNTER — TELEPHONE (OUTPATIENT)
Dept: CARDIOLOGY | Facility: CLINIC | Age: 63
End: 2023-10-13
Payer: COMMERCIAL

## 2023-10-13 ENCOUNTER — PATIENT OUTREACH (OUTPATIENT)
Dept: CARE COORDINATION | Facility: CLINIC | Age: 63
End: 2023-10-13
Payer: COMMERCIAL

## 2023-10-13 NOTE — TELEPHONE ENCOUNTER
Writer returned pt's phone message.    Called patient to discuss any post hospital d/c questions, review discharge medication, and confirm f/u appts. Patient denied any questions regarding new or changes to PTA medications.     Patient denied any SOB, chest pain, edema, or light headedness.    RFA access site is healing without signs of infection, swelling or bleeding.    RN confirmed with patient that she needs to be scheduled for labs, echo, cardiology DARYL, EP and cardiology appts as ordered. Scheduling phone number again provided.    Instructed patient to weigh self every AM, after waking and using the restroom, but before breakfast and medications. Call clinic for a weight gain of 2 lbs overnight or 5 lbs in a week. Low Na+ diet encouraged. Pt instructed to bring daily wt/BP diary and medications with to f/u OV. Dr. Leobardo Figueredo's Team RN phone number provided.    Patient advised to call clinic with any cardiac related questions or concerns prior to his appt. Patient verbalized understanding and agreed with plan. POPEYE Varner RN.

## 2023-10-13 NOTE — PROGRESS NOTES
Clinic Care Coordination Contact  Community Health Worker Initial Outreach    Patient accepts CC: No, Pt declined needs for extra support.  CHW checked PCP schedule to schedule appt for Pt. All available appt's in Oct/Nov. Are virtual appt's. Advised Pt to call clinic.     Clinic Care Coordination Contact  MARTÍN Blancas: Post-Discharge Note  SITUATION                                                      Admission:    Admission Date: 10/10/23   Reason for Admission: Idiopathic cardiomyopathy.  Paroxysmal supraventricular tachycardia.  Tobacco use disorder.  Acute exacerbation of heart failure with reduced ejection fraction.  Type II myocardial infarction due to demand ischemia.  Hypertension.  Hyperlipidemia.  Hypothyroidism.  Asthma.  Hypomagnesemia.  Discharge:   Discharge Date: 10/12/23  Discharge Diagnosis: Idiopathic cardiomyopathy.  Paroxysmal supraventricular tachycardia.  Tobacco use disorder.  Acute exacerbation of heart failure with reduced ejection fraction.  Type II myocardial infarction due to demand ischemia.  Hypertension.  Hyperlipidemia.  Hypothyroidism.  Asthma.  Hypomagnesemia.    BACKGROUND                                                      Per hospital discharge summary and inpatient provider notes:    Rhoda Ayala is a 63 year old female with a history of Asthma, Tobacco Abuse, HTN, Hypothyroidism, Melanoma, Migraine HA who presents to the ED today with shortness of breath and midsternal chest pain/epigastric pain with exertion.  Found to have acute CHF and small troponin elevation.  Initially started on IV furosemide.  Volume status is improved during hospital stay with diuresis.  She has now been transitioned to oral furosemide 40 mg a day.     ASSESSMENT           Discharge Assessment  How are you doing now that you are home?: doing well  How are your symptoms? (Red Flag symptoms escalate to triage hotline per guidelines): Improved  Do you feel your condition is stable enough to be safe  at home until your provider visit?: Yes  Does the patient have their discharge instructions? : Yes  Does the patient have questions regarding their discharge instructions? : No  Were you started on any new medications or were there changes to any of your previous medications? : Yes  Does the patient have all of their medications?: Yes  Do you have questions regarding any of your medications? : No  Do you have all of your needed medical supplies or equipment (DME)?  (i.e. oxygen tank, CPAP, cane, etc.): Yes  Discharge follow-up appointment scheduled within 14 calendar days? :  (planning to call clinic)    Post-op (W CTA Only)  If the patient had a surgery or procedure, do they have any questions for a nurse?: No         PLAN                                                      Outpatient Plan:      Follow-up and recommended labs and tests      Follow-up with cardiology per their orders.     Future Appointments   Date Time Provider Department Center   12/26/2023  8:00 AM Fili Vasquez MD [facility]     For any urgent concerns, please contact our 24 hour nurse triage line: [phone]     JOANA Hopkins  [phone]  [facility]

## 2023-10-13 NOTE — TELEPHONE ENCOUNTER
Patient was admitted to [facility] on 10/10/23 with shortness of breath and midsternal chest pain/epigastric pain with exertion. Found to have acute CHF and small troponin elevation. Started on IV Lasix.    PMH: asthma, tobacco abuse, HTN, hypothyroidism, melanoma, migraine HA.     10/10/23: Echo showed EF of 20-25%. The left ventricle is moderately dilated. There is normal left ventricular wall thickness. There is severe global hypokinesia of the left ventricle. The right ventricle is normal in structure, function and size. The left atrium is moderately dilated. There is moderate (2+) mitral regurgitation. The ascending aorta is mildly dilated.    10/11/23: Coronary angiogram via RFA showed mild CAD. Mild CAD. Mild calcification of aortic valve. Severe decrease in a dilated LV.  EF 20-25% with LVEDP 27-28 mmHg. Pt had recurrent episodes of SVT, did not resolve with Valsalva but both resolved with carotid sinus massage ( left carotid)  NOTE-- pt was not aware of the tachycardia. 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    Pt was started on ASA, Coreg, Zestril, Aldactone, Lasix and Crestor. PTA Norvasc and hydrochlorothiazide were discontinued at time of discharge. Pt was discharged wearing a 30 day heart monitor for SVT.    Pt needs to be scheduled for labs, echo, cardiology DARYL, EP and cardiology appts as ordered.    Writer attempted to call pt for a cardiology post discharge phone call, but no answer. VM left to return my phone call. Reminder left of need to schedule appts as above. Scheduling and writer's phone numbers provided. POPEYE Varner RN.

## 2023-10-18 ASSESSMENT — ASTHMA QUESTIONNAIRES: ACT_TOTALSCORE: 17

## 2023-10-18 NOTE — COMMUNITY RESOURCES LIST (ENGLISH)
10/18/2023   [facility] [employer]  N/A  For questions about this resource list or additional care needs, please contact your primary care clinic or care manager.  Phone: [phone]   Email: N/A   Address: [address] [place] [address]   Hours: N/A        Hotlines and Helplines       Hotline - Housing crisis  1  [facility] (Main Office) - Emergency Services Distance: 8.64 miles      Phone/Virtual   [address] [place] [address]  Language: English  Hours: Mon - Sun Open 24 Hours   Phone: (218) 414-1869 Email: info@[facility].org Website: http://[facility].org     2  [facility] Distance: 15.62 miles      Phone/Virtual   [address] [place] [address]  Language: English  Hours: Mon - Sun Open 24 Hours   Phone: (403) 990-2362 Email: communications@[facility].org Website: https://[facility].org/oursaviourshousing/          Housing       Coordinated Entry access point  3  [facility] - Housing Services Distance: 15.53 miles      In-Person   [address] [place] [address]  Language: English  Hours: Mon - Fri 9:00 AM - 5:00 PM  Fees: Free   Phone: (486) 286-3546 Email: housing@[facility].org Website: http://www.[facility].org/housing     4  [facility] Distance: 15.89 miles      In-Person, Phone/Virtual   [address] [place] [address]  Language: English, Icelandic  Hours: Mon - Fri 8:30 AM - 4:30 PM  Fees: Free   Phone: (636) 826-8525 Email: info@[facility].org Website: https://www.[facility].org/locations/[facility]-clinic/     Drop-in center or day shelter  5  [facility] Distance: 15.95 miles      In-Person   [address] [place] [address]  Language: English  Hours: Mon - Fri 12:00 PM - 3:00 PM  Fees: Free   Phone: (390) 296-4587 Email: thanhOb Hospitalist Groupcomguilherme@[employer].com Website: http://p[facility]ivida.org/     6  [facility]  Opp[facility] Center Distance: 16.05 miles      In-Person   [address] [place] [address]  Language: English, Macanese, Nigerien  Hours: Mon - Sat 7:00 AM - 3:00 PM  Fees: Free, Self Pay   Phone: (459) 464-1894 Email: info@[facility] Website: https://www.[employer]/locations/opportunity-center/     Housing search assistance  7  [facility] [employer] - Rental Homes for Future Homebuyers Program Distance: 7.19 miles      Phone/Virtual   [address] [place] [address]  Language: English  Hours: Mon - Fri 8:00 AM - 4:30 PM  Fees: Free   Phone: (303) 436-3467 Email: hra@[facility] Website: https://www.[facility]/hra/[place]-housing-and-ezocmlyxgyfcu-bzhoapbhh-pnl     8  [facility] Aging and Disability Services Distance: 12.43 miles      In-Person   [address] [place] [address]  Language: English  Hours: Mon - Fri 8:00 AM - 4:00 PM  Fees: Free, Insurance, Sliding Fee   Phone: (579) 568-5981 Email: aracelis@[facility]. Website: https://www.co.[facility]./HealthFamily/Disabilities     Shelter for families  9  [facility] Family Shelter Distance: 8.08 miles      In-Person   [address] [place] [address]  Language: English  Hours: Mon - Sun Open 24 Hours  Fees: Free, Sliding Fee   Phone: (850) 816-9260 Ext.1 Email: info@[facility].[employer] Website: http://www.[facility].org     Shelter for individuals  10  [facility] ([facility] Distance: 5.59 miles      In-Person   [address] [place] [address]  Language: English, Nigerien  Hours: Mon - Fri 8:00 AM - 4:30 PM  Fees: Free   Phone: (338) 137-1634 Email: info@[facility].org Website: http://www.capagency.org     11  [facility] Distance: 15.62 miles      In-Person   [address] [place] [address]  Language: English  Hours: Mon - Sun Open 24 Hours  Fees: Free   Phone: (715)  379-5565 Email: communications@[facility].org Website: https://[facility].org/oursaviourshousing/          Important Numbers & Websites       Emergency Services   911  [facility] Services   311  Poison Control   (983) 420-6752  Suicide Prevention Lifeline   (580) 365-2162 (TALK)  Child Abuse Hotline   (412) 978-6645 (4-A-Child)  Sexual Assault Hotline   (922) 495-3354 (HOPE)  National Runaway Safeline   (555) 237-5922 (RUNAWAY)  All-Options Talkline   (818) 323-4556  Substance Abuse Referral   (484) 377-6981 (HELP)

## 2023-10-19 ENCOUNTER — OFFICE VISIT (OUTPATIENT)
Dept: FAMILY MEDICINE | Facility: CLINIC | Age: 63
End: 2023-10-19
Payer: COMMERCIAL

## 2023-10-19 VITALS
TEMPERATURE: 97.5 F | HEART RATE: 54 BPM | BODY MASS INDEX: 31.86 KG/M2 | SYSTOLIC BLOOD PRESSURE: 101 MMHG | HEIGHT: 67 IN | WEIGHT: 203 LBS | RESPIRATION RATE: 18 BRPM | OXYGEN SATURATION: 97 % | DIASTOLIC BLOOD PRESSURE: 68 MMHG

## 2023-10-19 DIAGNOSIS — F17.200 SMOKER: ICD-10-CM

## 2023-10-19 DIAGNOSIS — Z78.0 POSTMENOPAUSAL STATUS: ICD-10-CM

## 2023-10-19 DIAGNOSIS — I21.A1 TYPE 2 MYOCARDIAL INFARCTION (H): ICD-10-CM

## 2023-10-19 DIAGNOSIS — I50.9 ACUTE CONGESTIVE HEART FAILURE, UNSPECIFIED HEART FAILURE TYPE (H): ICD-10-CM

## 2023-10-19 DIAGNOSIS — I10 BENIGN ESSENTIAL HYPERTENSION: ICD-10-CM

## 2023-10-19 DIAGNOSIS — I47.10 PAROXYSMAL SUPRAVENTRICULAR TACHYCARDIA (H): ICD-10-CM

## 2023-10-19 DIAGNOSIS — I42.9 IDIOPATHIC CARDIOMYOPATHY (H): Primary | ICD-10-CM

## 2023-10-19 LAB
ANION GAP SERPL CALCULATED.3IONS-SCNC: 12 MMOL/L (ref 7–15)
BUN SERPL-MCNC: 22.6 MG/DL (ref 8–23)
CALCIUM SERPL-MCNC: 10.2 MG/DL (ref 8.8–10.2)
CHLORIDE SERPL-SCNC: 101 MMOL/L (ref 98–107)
CREAT SERPL-MCNC: 0.95 MG/DL (ref 0.51–0.95)
DEPRECATED HCO3 PLAS-SCNC: 26 MMOL/L (ref 22–29)
EGFRCR SERPLBLD CKD-EPI 2021: 66 ML/MIN/1.73M2
GLUCOSE SERPL-MCNC: 101 MG/DL (ref 70–99)
POTASSIUM SERPL-SCNC: 4.1 MMOL/L (ref 3.4–5.3)
SODIUM SERPL-SCNC: 139 MMOL/L (ref 135–145)

## 2023-10-19 PROCEDURE — 36415 COLL VENOUS BLD VENIPUNCTURE: CPT | Performed by: PHYSICIAN ASSISTANT

## 2023-10-19 PROCEDURE — 99495 TRANSJ CARE MGMT MOD F2F 14D: CPT | Performed by: PHYSICIAN ASSISTANT

## 2023-10-19 PROCEDURE — 80048 BASIC METABOLIC PNL TOTAL CA: CPT | Performed by: PHYSICIAN ASSISTANT

## 2023-10-19 ASSESSMENT — PAIN SCALES - GENERAL: PAINLEVEL: NO PAIN (0)

## 2023-10-19 NOTE — PROGRESS NOTES
Hospital Course  Rhoda Ayala is a 63 year old female with a history of Asthma, Tobacco Abuse, HTN, Hypothyroidism, Melanoma, Migraine HA who presents to the ED today with shortness of breath and midsternal chest pain/epigastric pain with exertion.  Found to have acute CHF and small troponin elevation.  Initially started on IV furosemide.  Volume status is improved during hospital stay with diuresis.  She has now been transitioned to oral furosemide 40 mg a day.  She was seen in consultation by cardiology.  Did have coronary angiogram done on 10/11.  Noted to have trivial coronary atherosclerosis.  No need for intervention.  She has continued to have episodes of paroxysmal supraventricular tachycardia during hospital stay.  These are improving, but still present on day of discharge.  There is question of possible AV elizabeth reentry tachycardia.  Cardiology is planning to monitor with an outpatient cardiac monitor initially.  Have her follow-up with electrophysiology specialist in addition to general cardiology within the next few weeks.  Previous albuterol has been switched to Xopenex nebs to try to minimize because of PSVT.  Follow-up with both general and EP cardiology within the next 3 weeks.      Subjective   Rhoda is a 63 year old, presenting for the following health issues:  No chief complaint on file.      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  She is monitoring home weight and it is stable over the past 5 days at 202lbs  She is wearing a heart monitor until next month  Denies chest pain, palpit, lightheadedness or sob currently  Denies lower extr edema and notes in HF she had epigastric fullness  She had an angiogram showing only mild nonobstructive dz  Pt is working on quitting smoking; hasn't smoked in a week    Her endocrinologist recd a [facility]  Follow-up Visit:    Hospital/Nursing Home/IP Rehab Facility: [facility]  Date of Admission: 10/10/2023  Date of Discharge: 10/12/2023  Reason(s) for Admission:     Idiopathic cardiomyopathy.  Paroxysmal supraventricular tachycardia.  Tobacco use disorder.  Acute exacerbation of heart failure with reduced ejection fraction.  Type II myocardial infarction due to demand ischemia.  Hypertension.  Hyperlipidemia.  Hypothyroidism.  Asthma.  Hypomagnesemia.    Was your hospitalization related to COVID-19? No   Problems taking medications regularly:  None  Medication changes since discharge: yes, updated in med list   Problems adhering to non-medication therapy:  None    Summary of hospitalization:  [facility] discharge summary reviewed  Diagnostic Tests/Treatments reviewed.  Follow up needed: cardiology  Other Healthcare Providers Involved in Patient s Care:         Specialist appointment - made  Update since discharge: improved.         Plan of care communicated with patient             Past Medical History:   Diagnosis Date    Asthma     Cellulitis     s/p I&D of wound    Hypertension     Hypothyroidism     Lower extremity edema     Melanoma (H) 2015    S/P appendectomy     S/P arthroscopic knee surgery     S/P  section     S/P lateral meniscectomy of right knee     S/P JOSEP (total abdominal hysterectomy)     Seasonal allergies      Past Surgical History:   Procedure Laterality Date    APPENDECTOMY      COLONOSCOPY N/A 2016    Procedure: COLONOSCOPY;  Surgeon: Axel Rivera MD;  Location:  GI    CV CORONARY ANGIOGRAM N/A 10/11/2023    Procedure: Coronary Angiogram;  Surgeon: Reinaldo Woodward MD;  Location:  HEART CARDIAC CATH LAB    CV LEFT HEART CATH N/A 10/11/2023    Procedure: Left Heart Catheterization;  Surgeon: Reinaldo Woodward MD;  Location:  HEART CARDIAC CATH LAB    CV LEFT VENTRICULOGRAM N/A 10/11/2023    Procedure: Left Ventriculogram;   Surgeon: Reinaldo Woodward MD;  Location:  HEART CARDIAC CATH LAB    HYSTERECTOMY, PAP NO LONGER INDICATED       Social History     Tobacco Use    Smoking status: Light Smoker     Packs/day: 0.25     Years: 3.00     Additional pack years: 0.00     Total pack years: 0.75     Types: Cigarettes    Smokeless tobacco: Never   Substance Use Topics    Alcohol use: Yes     Alcohol/week: 0.0 standard drinks of alcohol     Comment: rare use, 3-4 x per year     Current Outpatient Medications   Medication Sig Dispense Refill    aspirin 81 MG EC tablet Take 1 tablet (81 mg) by mouth daily 30 tablet 0    budesonide (PULMICORT FLEXHALER) 180 MCG/ACT inhaler INHALE 2 PUFFS INTO THE LUNGS TWICE DAILY Due for appointment. Please schedule: [phone] 3 Inhaler 1    carvedilol (COREG) 6.25 MG tablet Take 1 tablet (6.25 mg) by mouth 2 times daily (with meals) for 30 days 60 tablet 0    cetirizine (ZYRTEC) 10 MG tablet Take 10 mg by mouth daily      cyclobenzaprine (FLEXERIL) 5 MG tablet TAKE 1 TABLET(5 MG) BY MOUTH THREE TIMES DAILY AS NEEDED FOR MUSCLE SPASMS 42 tablet 11    furosemide (LASIX) 40 MG tablet Take 1 tablet (40 mg) by mouth daily for 30 days 30 tablet 0    guaiFENesin (MUCINEX) 600 MG 12 hr tablet Take 1,200 mg by mouth 2 times daily as needed for congestion      ibuprofen (ADVIL/MOTRIN) 200 MG tablet Take 400-600 mg by mouth 4 times daily as needed       levalbuterol (XOPENEX) 0.63 MG/3ML neb solution Take 3 mLs (0.63 mg) by nebulization every 6 hours as needed for shortness of breath or wheezing 90 mL 0    Levothyroxine Sodium (LEVOTHROID PO) Take 175 mcg by mouth daily      lisinopril (ZESTRIL) 2.5 MG tablet Take 1 tablet (2.5 mg) by mouth daily for 30 days 30 tablet 0    melatonin 5 MG tablet Take 5 mg by mouth nightly as needed for sleep      rosuvastatin (CRESTOR) 5 MG tablet Take 1 tablet (5 mg) by mouth daily for 30 days 30 tablet 0    spironolactone (ALDACTONE) 25 MG tablet Take 0.5 tablets (12.5 mg) by mouth  "daily for 30 days 15 tablet 0    SUMAtriptan (IMITREX) 100 MG tablet TAKE 1 TABLET BY MOUTH AT ONSET OF HEADACHE AS DIRECTED 9 tablet 11    vitamin D3 (CHOLECALCIFEROL) 50 mcg (2000 units) tablet Take 1 tablet by mouth daily       Allergies   Allergen Reactions    Atorvastatin Muscle Pain (Myalgia)    Montelukast Sodium Unknown    Pravastatin      Edema, cramping     FAMILY HISTORY NOTED AND REVIEWED    PHYSICAL EXAM:    /68 (BP Location: Right arm, Patient Position: Sitting, Cuff Size: Adult Large)   Pulse 54   Temp 97.5  F (36.4  C) (Temporal)   Resp 18   Ht 1.702 m (5' 7\")   Wt 92.1 kg (203 lb)   SpO2 97%   BMI 31.79 kg/m      Patient appears non toxic  Lungs: CTA bilat without wheezing or crackles  Heart: RRR without m/r/g  Extr: no edema  Psych: pleasant mood    Assessment and Plan:     (I42.9) Idiopathic cardiomyopathy (H)  (primary encounter diagnosis)  Comment: with reduced EF  Plan: pt tolerating new medications.     (I50.9) Acute congestive heart failure, unspecified heart failure type (H)  Comment:   Plan: wt is being monitored at home and is stable. Denies dyspnea or orthopnea    (I47.10) Paroxysmal supraventricular tachycardia  Comment:   Plan: pt wearing a heart monitor and has a follow up scheduled with cardiology      (I21.A1) Type 2 myocardial infarction (H)  Comment:   Plan: angiogram shows mild nonobstructive dz    (I10) Benign essential hypertension  Comment: well controlled, pt to monitor at home and let me know if SBP<100 or feeling hypotensive.  Plan: Home Blood Pressure Monitor Order for DME -         ONLY FOR DME, Basic metabolic panel  (Ca, Cl,         CO2, Creat, Gluc, K, Na, BUN)            (Z78.0) Postmenopausal status  Comment:   Plan: DX Hip/Pelvis/Spine            (F17.200) Smoker  Comment:   Plan: pt quit one week ago. Feels comfortable doing this \"cold turkey\" but agrees to contact us if needs help with cessation going forward      Rashida Casillas PA-C                "

## 2023-10-24 ENCOUNTER — TRANSFERRED RECORDS (OUTPATIENT)
Dept: HEALTH INFORMATION MANAGEMENT | Facility: CLINIC | Age: 63
End: 2023-10-24
Payer: COMMERCIAL

## 2023-10-24 ENCOUNTER — TELEPHONE (OUTPATIENT)
Dept: CARDIOLOGY | Facility: CLINIC | Age: 63
End: 2023-10-24
Payer: COMMERCIAL

## 2023-10-24 DIAGNOSIS — I50.21 ACUTE SYSTOLIC CONGESTIVE HEART FAILURE (H): ICD-10-CM

## 2023-10-24 RX ORDER — LISINOPRIL 2.5 MG/1
2.5 TABLET ORAL DAILY
Qty: 90 TABLET | Refills: 0 | Status: SHIPPED | OUTPATIENT
Start: 2023-10-24 | End: 2023-11-08

## 2023-10-24 RX ORDER — ROSUVASTATIN CALCIUM 5 MG/1
5 TABLET, COATED ORAL DAILY
Qty: 90 TABLET | Refills: 0 | Status: SHIPPED | OUTPATIENT
Start: 2023-10-24 | End: 2023-11-08

## 2023-10-24 RX ORDER — CARVEDILOL 6.25 MG/1
6.25 TABLET ORAL 2 TIMES DAILY WITH MEALS
Qty: 180 TABLET | Refills: 0 | Status: SHIPPED | OUTPATIENT
Start: 2023-10-24 | End: 2023-11-08

## 2023-10-24 RX ORDER — SPIRONOLACTONE 25 MG/1
12.5 TABLET ORAL DAILY
Qty: 45 TABLET | Refills: 0 | Status: SHIPPED | OUTPATIENT
Start: 2023-10-24 | End: 2023-11-08

## 2023-10-24 RX ORDER — FUROSEMIDE 40 MG
40 TABLET ORAL DAILY
Qty: 90 TABLET | Refills: 0 | Status: SHIPPED | OUTPATIENT
Start: 2023-10-24 | End: 2023-11-08

## 2023-10-24 NOTE — TELEPHONE ENCOUNTER
[facility] Call Center    Phone Message    May a detailed message be left on voicemail: yes     Reason for Call: Medication Refill Request    Has the patient contacted the pharmacy for the refill? Yes   Name of medication being requested: aspirin 81 MG EC tablet  furosemide (LASIX) 40 MG tablet   lisinopril (ZESTRIL) 2.5 MG tablet  rosuvastatin (CRESTOR) 5 MG tablet  spironolactone (ALDACTONE) 25 MG tablet  carvedilol (COREG) 6.25 MG tablet  levalbuterol (XOPENEX) 0.63 MG/3ML neb solution  Provider who prescribed the medication: Jong Castro   Pharmacy: [id number] [id number] AT [place] [address]    Date medication is needed: 10/25/23   Action Taken: Other: cardiology    Travel Screening: Not Applicable    Thank you!  [facility]

## 2023-10-24 NOTE — TELEPHONE ENCOUNTER
Called patient, advised her that I will refill cardiac medications as requested.  Prescriptions for cardiac meds E scripted to [facility] as requested. Viri NORMAN

## 2023-11-01 ENCOUNTER — TELEPHONE (OUTPATIENT)
Dept: CARDIOLOGY | Facility: CLINIC | Age: 63
End: 2023-11-01
Payer: COMMERCIAL

## 2023-11-01 DIAGNOSIS — I50.23 ACUTE ON CHRONIC SYSTOLIC HEART FAILURE (H): ICD-10-CM

## 2023-11-01 DIAGNOSIS — I50.9 ACUTE CONGESTIVE HEART FAILURE, UNSPECIFIED HEART FAILURE TYPE (H): Primary | ICD-10-CM

## 2023-11-01 DIAGNOSIS — R06.02 SOB (SHORTNESS OF BREATH): ICD-10-CM

## 2023-11-01 NOTE — PROGRESS NOTES
Updated orders for CORE Enrollment with KARLI Paul.   Rhoda qualifies for [facility] as echo on 10/10/2023 showed EF of 20-25%.    She is having repeat eco on 11/13/2023. Added BMP to appt per Dr Leobardo Figueredo as she had medications adjusted at discharge on 10/12/2023. Last BMP 10/19/2023.     Updated DARYL order to CORE DARYL for enrollment and added BMP and NT pro BNP prior to visit.     A sooner appt on 11/15/2023 with Jenn Gomez PAC is being held and messaging will reach out to Rhoda to see if new time and day work.         Future Appointments   Date Time Provider Department Center   11/10/2023 10:00 AM SHMADX1 SHDEXA [facility]   11/13/2023  7:30 AM RSCCECHO2 [facility]   12/26/2023  8:00 AM Fili Vasquez MD [facility] CS   12/28/2023  1:00 PM RU LAB [facility]   12/28/2023  1:30 PM Radha Moss PA-C [facility]   2/7/2024  9:15 AM Sheldon Moore MD [facility]   3/18/2024  1:15 PM Parker Claros MD [facility]         SILVINA Hernandez, RN 4:51 PM 11/01/23

## 2023-11-02 NOTE — TELEPHONE ENCOUNTER
Patient has [employer] through an employer.    Jardiance:  $40/mo. Patient is eligible to download a copay savings card from [employer] to reduce this to $10/mo.     Farxiga:  $40/mo. Patient is eligible to download a copay savings card from farxiga.com to reduce this to $10/mo.     Entresto:  $50/mo. Patient is eligible to download a copay savings card from [employer] to reduce this to $10/mo.      Lali Aguilar  Pharmacy Technician/Liaison, Discharge Pharmacy   [phone] (voice or text)  [email or web address]

## 2023-11-02 NOTE — PROGRESS NOTES
CORE Enrollment appt r/s with Yenny on 11/27    Future Appointments   Date Time Provider Department Center   11/10/2023 10:00 AM SHMADX1 SHDEXA [facility]   11/13/2023  7:30 AM RSCCECHO2 [facility] RSCC   11/13/2023  8:30 AM RU LAB [facility]   11/27/2023  2:30 PM Yenny Stringer PA-C [facility] CL[facility]   12/26/2023  8:00 AM Fili Vasquez MD [facility] CS   2/7/2024  9:15 AM Sheldon Moore MD [facility]   3/18/2024  1:15 PM Parker Claros MD [facility]     SILVINA Hernandez, RN 3:54 PM 11/02/23

## 2023-11-06 ENCOUNTER — TELEPHONE (OUTPATIENT)
Dept: CARDIOLOGY | Facility: CLINIC | Age: 63
End: 2023-11-06
Payer: COMMERCIAL

## 2023-11-06 NOTE — TELEPHONE ENCOUNTER
Call to Rhoda to offer her sooner CORE enrollment appt.   She will take 11/15 appt with KARLI Hidalgo.   Did let her know that labs would be drawn at 11/13 appt with her echo.   She is feeling well.   Discussed doing daily weights.    Message to scheduling to r/s CORE enrollment appt 11/27 to 11/15 with Jenn.      Future Appointments   Date Time Provider Department Center   11/10/2023 10:00 AM SHMADX1 SHDEXA [facility]   11/13/2023  7:30 AM RSCCECHO2 [facility]CC   11/13/2023  8:30 AM RU LAB [facility]   11/27/2023  2:30 PM Yenny Stringer PA-C [facility] CLIN   12/26/2023  8:00 AM Fili Vasquez MD [facility] CS   2/7/2024  9:15 AM Sheldon Moore MD [facility] CL[facility]   3/18/2024  1:15 PM Parker Claros MD [facility]         SILVINA Hernandez, RN 11:07 AM 11/06/23

## 2023-11-06 NOTE — TELEPHONE ENCOUNTER
Appt r/s.  Closing this encounter      Future Appointments   Date Time Provider Department Center   11/10/2023 10:00 AM SHMADX1 SHDEXA [facility]   11/13/2023  7:30 AM RSCCECHO2 [facility]CC   11/13/2023  8:30 AM [facility]   11/15/2023  9:00 AM Jenn Gomez PA-C [facility] PSA CLIN   12/26/2023  8:00 AM Fili Vasquez MD [facility] CS   2/7/2024  9:15 AM Sheldon Moore MD [facility] PSA CLIN   3/18/2024  1:15 PM Parker Claros MD [facility] CLIN         DARÍO HernandezN, RN 3:26 PM 11/06/23

## 2023-11-08 ENCOUNTER — MYC REFILL (OUTPATIENT)
Dept: CARDIOLOGY | Facility: CLINIC | Age: 63
End: 2023-11-08
Payer: COMMERCIAL

## 2023-11-08 DIAGNOSIS — I50.21 ACUTE SYSTOLIC CONGESTIVE HEART FAILURE (H): ICD-10-CM

## 2023-11-08 RX ORDER — CARVEDILOL 6.25 MG/1
6.25 TABLET ORAL 2 TIMES DAILY WITH MEALS
Qty: 180 TABLET | Refills: 0 | Status: SHIPPED | OUTPATIENT
Start: 2023-11-08 | End: 2023-11-13

## 2023-11-08 RX ORDER — LISINOPRIL 2.5 MG/1
2.5 TABLET ORAL DAILY
Qty: 90 TABLET | Refills: 0 | Status: SHIPPED | OUTPATIENT
Start: 2023-11-08 | End: 2024-01-26

## 2023-11-08 RX ORDER — ROSUVASTATIN CALCIUM 5 MG/1
5 TABLET, COATED ORAL DAILY
Qty: 90 TABLET | Refills: 0 | Status: SHIPPED | OUTPATIENT
Start: 2023-11-08 | End: 2024-01-26

## 2023-11-08 RX ORDER — FUROSEMIDE 40 MG
40 TABLET ORAL DAILY
Qty: 90 TABLET | Refills: 0 | Status: SHIPPED | OUTPATIENT
Start: 2023-11-08 | End: 2024-01-09

## 2023-11-08 RX ORDER — SPIRONOLACTONE 25 MG/1
12.5 TABLET ORAL DAILY
Qty: 45 TABLET | Refills: 0 | Status: SHIPPED | OUTPATIENT
Start: 2023-11-08 | End: 2024-01-26

## 2023-11-10 ENCOUNTER — HOSPITAL ENCOUNTER (OUTPATIENT)
Dept: BONE DENSITY | Facility: CLINIC | Age: 63
Discharge: HOME | End: 2023-11-10
Attending: PHYSICIAN ASSISTANT | Admitting: PHYSICIAN ASSISTANT
Payer: COMMERCIAL

## 2023-11-10 ENCOUNTER — TELEPHONE (OUTPATIENT)
Dept: CARDIOLOGY | Facility: CLINIC | Age: 63
End: 2023-11-10
Payer: COMMERCIAL

## 2023-11-10 DIAGNOSIS — Z78.0 POSTMENOPAUSAL STATUS: ICD-10-CM

## 2023-11-10 PROCEDURE — 77080 DXA BONE DENSITY AXIAL: CPT

## 2023-11-10 NOTE — TELEPHONE ENCOUNTER
Received Urgent Call from [employer] re: new finding on heart monitor                                                                                  Called patient with results, patient denies feeling lightheaded or dizzy but states she can feel her heart flutter at times but states these go symptoms go away quickly, states they may be last about 10 minutes.  Patient describes it as having a butterfly in her chest wall.  Patient also states when she lies on her left side she feels irregular heart palpitations, states she turns over and the symptoms go away.  Patient states she also gets the same symptoms when she is sitting in a chair and leans back but states the symptoms resolve when she leans forward.  Will message Dr. Leobardo Figueredo to review.  MARGARETH Goodman RN

## 2023-11-10 NOTE — RESULT ENCOUNTER NOTE
Rhoda,    Your bone density is normal meaning you do not have thinning of the bones (osteoporosis).    Rashida Casillas PA-C

## 2023-11-11 ENCOUNTER — HEALTH MAINTENANCE LETTER (OUTPATIENT)
Age: 63
End: 2023-11-11

## 2023-11-13 ENCOUNTER — HOSPITAL ENCOUNTER (OUTPATIENT)
Dept: CARDIOLOGY | Facility: CLINIC | Age: 63
Discharge: HOME | End: 2023-11-13
Attending: INTERNAL MEDICINE | Admitting: INTERNAL MEDICINE
Payer: COMMERCIAL

## 2023-11-13 ENCOUNTER — LAB (OUTPATIENT)
Dept: LAB | Facility: CLINIC | Age: 63
End: 2023-11-13
Payer: COMMERCIAL

## 2023-11-13 DIAGNOSIS — I50.21 ACUTE SYSTOLIC CONGESTIVE HEART FAILURE (H): ICD-10-CM

## 2023-11-13 DIAGNOSIS — R06.02 SOB (SHORTNESS OF BREATH): ICD-10-CM

## 2023-11-13 DIAGNOSIS — I50.23 ACUTE ON CHRONIC SYSTOLIC HEART FAILURE (H): ICD-10-CM

## 2023-11-13 DIAGNOSIS — I42.9 CARDIOMYOPATHY, UNSPECIFIED TYPE (H): ICD-10-CM

## 2023-11-13 DIAGNOSIS — I48.92 ATRIAL FLUTTER (H): Primary | ICD-10-CM

## 2023-11-13 LAB
ANION GAP SERPL CALCULATED.3IONS-SCNC: 11 MMOL/L (ref 7–15)
BUN SERPL-MCNC: 18.3 MG/DL (ref 8–23)
CALCIUM SERPL-MCNC: 9.7 MG/DL (ref 8.8–10.2)
CHLORIDE SERPL-SCNC: 104 MMOL/L (ref 98–107)
CREAT SERPL-MCNC: 0.89 MG/DL (ref 0.51–0.95)
DEPRECATED HCO3 PLAS-SCNC: 26 MMOL/L (ref 22–29)
EGFRCR SERPLBLD CKD-EPI 2021: 72 ML/MIN/1.73M2
GLUCOSE SERPL-MCNC: 107 MG/DL (ref 70–99)
LVEF ECHO: NORMAL
NT-PROBNP SERPL-MCNC: 1192 PG/ML (ref 0–900)
POTASSIUM SERPL-SCNC: 3.9 MMOL/L (ref 3.4–5.3)
SODIUM SERPL-SCNC: 141 MMOL/L (ref 135–145)

## 2023-11-13 PROCEDURE — 93308 TTE F-UP OR LMTD: CPT

## 2023-11-13 PROCEDURE — 93321 DOPPLER ECHO F-UP/LMTD STD: CPT | Mod: 26 | Performed by: INTERNAL MEDICINE

## 2023-11-13 PROCEDURE — 36415 COLL VENOUS BLD VENIPUNCTURE: CPT | Performed by: INTERNAL MEDICINE

## 2023-11-13 PROCEDURE — 80048 BASIC METABOLIC PNL TOTAL CA: CPT | Performed by: INTERNAL MEDICINE

## 2023-11-13 PROCEDURE — 93325 DOPPLER ECHO COLOR FLOW MAPG: CPT

## 2023-11-13 PROCEDURE — 93308 TTE F-UP OR LMTD: CPT | Mod: 26 | Performed by: INTERNAL MEDICINE

## 2023-11-13 PROCEDURE — 83880 ASSAY OF NATRIURETIC PEPTIDE: CPT | Performed by: INTERNAL MEDICINE

## 2023-11-13 PROCEDURE — 93325 DOPPLER ECHO COLOR FLOW MAPG: CPT | Mod: 26 | Performed by: INTERNAL MEDICINE

## 2023-11-13 RX ORDER — CARVEDILOL 6.25 MG/1
9.38 TABLET ORAL 2 TIMES DAILY WITH MEALS
Qty: 270 TABLET | Refills: 3 | Status: SHIPPED | OUTPATIENT
Start: 2023-11-13 | End: 2024-01-09

## 2023-11-13 NOTE — TELEPHONE ENCOUNTER
Called pt with recommendations from Dr. Claros. Prescriptions escripted to [facility] as requested & pt scheduled to see ARNULFO Hidalgo 11/13/23. Pt informed of echo showing EF at 35-40% up from 25% in October. Pt advised to keep appt 11/15/23 as scheduled. Viri NORMAN

## 2023-11-13 NOTE — TELEPHONE ENCOUNTER
Start Eliquis 5 mg twice a day.  Increased dose of carvedilol to 9.375 mg twice a day.  Patient needs to be seen in clinic for up titration of medications and assessment of atrial fibrillation.  Thanks

## 2023-11-30 NOTE — TELEPHONE ENCOUNTER
Cardiac event monitor reviewed and signed by Dr. Leobardo Figueredo.  Summary sent to scanning..  Patient has appointment to see ARNULFO Stanley 12/05/2023, patient seen Dr. Moore in February 2024 and sees Dr. Leobardo Figueredo in March 2024. Viri NORMAN

## 2023-12-05 ENCOUNTER — OFFICE VISIT (OUTPATIENT)
Dept: CARDIOLOGY | Facility: CLINIC | Age: 63
End: 2023-12-05
Attending: INTERNAL MEDICINE
Payer: COMMERCIAL

## 2023-12-05 VITALS
HEIGHT: 67 IN | DIASTOLIC BLOOD PRESSURE: 80 MMHG | OXYGEN SATURATION: 97 % | HEART RATE: 62 BPM | SYSTOLIC BLOOD PRESSURE: 124 MMHG | BODY MASS INDEX: 31.31 KG/M2 | WEIGHT: 199.5 LBS

## 2023-12-05 DIAGNOSIS — I48.0 PAF (PAROXYSMAL ATRIAL FIBRILLATION) (H): Primary | ICD-10-CM

## 2023-12-05 DIAGNOSIS — I50.23 ACUTE ON CHRONIC SYSTOLIC HEART FAILURE (H): ICD-10-CM

## 2023-12-05 PROCEDURE — 99215 OFFICE O/P EST HI 40 MIN: CPT | Performed by: INTERNAL MEDICINE

## 2023-12-05 NOTE — LETTER
12/5/2023    Fili Vasquez MD, MD  [address]  [place] [address]    RE: Rhoda Ayala       Dear Colleague,     I had the pleasure of seeing Rhoda Ayala in the [facility].  Cardiology Clinic Progress Note  Rhoda Ayala MRN# [id number]   YOB: 1960 Age: 63 year old   Primary Cardiologist: Dr. Leobardo Figueredo Reason for visit: CORE enrollment            Assessment and Plan:   Rhoda Ayala is a very pleasant 63 year old female who is here today for CORE enrollment.      1.  HFrEF and nonischemic cardiomyopathy  - LVEF: 20-25% on echo 10/2023, improved to 35-40% on echo 11/2023  - Etiology: nonischemic  - Fluid status: euvolemic  - Diuretic regimen: Lasix 40 mg once daily  - Ischemic evaluation: Cor angio 10/11/2023 revealed mild nonobstructive CAD  - Guideline directed medical therapy:  - Beta blocker: Carvedilol 9.375 mg twice daily  - ACEI/ARB/ARNI: Lisinopril 2.5 mg once daily  - Aldactone antagonist: Spironolactone 12.5 mg once daily  - SGLT2 inhibitor: Start Jardiance 10 mg once daily  -Counseled patient on sodium restriction  2.  Paroxysmal atrial arrhythmias including atrial fibrillation and SVT.  On Eliquis 5 mg twice daily for cardioembolic risk reduction.  Perform 7-day Zio patch monitor to evaluate frequency/duration of AFIB and SVT episodes on current dose of beta-blocker.  Has EP evaluation set up 2/7/2023  3.  Hypertension, BP well-controlled on current regimen  4.  Mild nonobstructive coronary artery disease by coronary angio 10/11/2023.  On statin  5.  Hypothyroidism, on levothyroxine    Changes today:   Start Jardiance 10 mg once daily.  Patient is eligible for co-pay card from [email or web address] to reduce cost of drug to $10 per month.  I helped her obtain this in clinic today.    Ms. Ayala has been feeling well since discharge from the hospital.  Denies any symptoms concerning for angina or heart failure.  She is tolerating all of her cardiac medications  well without apparent side effect.  In order to optimize her GDMT regimen further, I will start her on Jardiance 10 mg once daily.     She completed a cardiac event monitor which revealed evidence of atrial fibrillation and was subsequently started on Eliquis 5 mg twice daily for cardioembolic risk reduction.  Unfortunately, with this type of monitor, we are not able to determine duration of atrial fibrillation episodes as well as average rates during episodes.  As such, I recommend completion of a 7-day Zio patch monitor to evaluate burden of atrial fibrillation as well as ventricular rates while in A-fib on current dose of carvedilol.    EP consultation as scheduled 2/2024.    CORE follow-up with me in 1 month with labs prior.    Echocardiogram in 3 months with subsequent Dr. Leobardo Figueredo follow-up.    Yenny Stringer PA-C  [facility]  Pager: [phone]          History of Presenting Illness:    Rhoda Ayala is a very pleasant 63 year old female with a history of hypothyroidism and hypertension.     Patient presented to [facility] ED 10/10/2023 with exertional shortness of breath and chest discomfort.  ECG on admission showed NSR with nonspecific T wave changes. Labs notable for elevated NT proBNP at 3228, troponin mildly elevated at 29 and flat.  Normal renal function and electrolytes.  Blood counts normal with exception of mildly elevated WBC at 11.6.  Echocardiogram 10/10/2023 revealed severely decreased LV systolic function with LVEF 20 to 25%, moderately dilated LV with severe global hypokinesia of the LV.  RV was normal with respect to size and function.  LA moderately dilated.  2+ MR was also noted.  Coronary angiogram was completed 10/11/2023 that revealed mild nonobstructive coronary artery disease.  Patient was having recurrent episodes of SVT during the case that did not resolve with Valsalva but did resolve with carotid sinus massage.  She was diuresed and medications were  "optimized. At time of discharge, patient was started on Lasix 40 mg once daily.  Referral to electrophysiology was recommended given evidence of AVNRT during her admission as well as cardiac event monitor placement at time of discharge.    Dr. Claros started patient on Eliquis 5 mg twice daily and uptitrated carvedilol to 9.375 mg twice daily after cardiac monitor revealed evidence of atrial fibrillation 11/13/2023. Ms. Ayala had a repeat echocardiogram 11/13/2023 that showed LVEF 35-40% with moderate global hypokinesia of the LV, normal RV size and function, and mild to moderate MR (1-2+).     Most recent lab work completed 11/13/2023 revealed normal renal function and electrolytes.  NT proBNP elevated at 1192, though improved when compared to prior    Patient is here today for CORE enrollment/hospital follow up. She has been feeling well since discharge from the hospital. Denies chest pain, SOB, and palpitations. She did have some \"flutters\" in her chest that occurred while she was wearing her event monitor but reports these resolved after her carvedilol was uptitrated to 9.375 mg BID. No lightheadedness, dizziness, near-syncope or syncope.  No orthopnea or PND.    Her weights have been steadily dropping over the last 1 to 2 months, 206>>198#.  She is watching the sodium in her diet very closely.  Reports she has been bradycardic her entire life with heart rate 60s.    Takes all medications daily as prescribed.  Works out with a  2 times per month.  In addition to this, she walks on the treadmill, uses the elliptical, and perform some light weight training.  Very rare alcohol consumption, maybe once yearly.  No illicit drug use.  No tobacco use.      Social History       Social History     Socioeconomic History    Marital status: Single     Spouse name: Not on file    Number of children: Not on file    Years of education: Not on file    Highest education level: Not on file   Occupational History    Not " "on file   Tobacco Use    Smoking status: Light Smoker     Packs/day: 0.25     Years: 3.00     Additional pack years: 0.00     Total pack years: 0.75     Types: Cigarettes    Smokeless tobacco: Never   Substance and Sexual Activity    Alcohol use: Yes     Alcohol/week: 0.0 standard drinks of alcohol     Comment: rare use, 3-4 x per year    Drug use: No    Sexual activity: Yes     Partners: Male   Other Topics Concern    Parent/sibling w/ CABG, MI or angioplasty before 65F 55M? Not Asked   Social History Narrative    Not on file     Social Determinants of Health     Financial Resource Strain: Low Risk  (10/18/2023)    Financial Resource Strain     Within the past 12 months, have you or your family members you live with been unable to get utilities (heat, electricity) when it was really needed?: No   Food Insecurity: Low Risk  (10/18/2023)    Food Insecurity     Within the past 12 months, did you worry that your food would run out before you got money to buy more?: No     Within the past 12 months, did the food you bought just not last and you didn t have money to get more?: No   Transportation Needs: Low Risk  (10/18/2023)    Transportation Needs     Within the past 12 months, has lack of transportation kept you from medical appointments, getting your medicines, non-medical meetings or appointments, work, or from getting things that you need?: No   Physical Activity: Not on file   Stress: Not on file   Social Connections: Not on file   Interpersonal Safety: Not on file   Housing Stability: High Risk (10/18/2023)    Housing Stability     Do you have housing? : No     Are you worried about losing your housing?: No            Review of Systems:   Please see HPI         Physical Exam:   Vitals: /80 (BP Location: Right arm, Patient Position: Sitting, Cuff Size: Adult Large)   Pulse 62   Ht 1.702 m (5' 7\")   Wt 90.5 kg (199 lb 8 oz)   SpO2 97%   BMI 31.25 kg/m     Wt Readings from Last 4 Encounters:   10/19/23 " 92.1 kg (203 lb)   10/12/23 91.3 kg (201 lb 3.2 oz)   09/21/22 91.6 kg (202 lb)   02/23/22 90.3 kg (199 lb)     GEN: well nourished, in no acute distress.  HEENT:  Pupils equal, round. Sclerae nonicteric.   NECK: Supple, no masses appreciated. No JVD  C/V:  Regular rate and rhythm, no murmur, rub or gallop.    RESP: Respirations are unlabored. Clear to auscultation bilaterally without wheezing, rales, or rhonchi.  GI: Abdomen soft, nontender.  EXTREM: no LE edema.  NEURO: Alert and oriented, cooperative.  SKIN: Warm and dry.        Data:   LIPID RESULTS:  Lab Results   Component Value Date    CHOL 189 10/10/2023    CHOL 154 12/09/2020    HDL 42 (L) 10/10/2023    HDL 34 (L) 12/09/2020     (H) 10/10/2023    LDL 65 12/09/2020    TRIG 216 (H) 10/10/2023    TRIG 276 (H) 12/09/2020    CHOLHDLRATIO 7.0 (H) 06/16/2014     LIVER ENZYME RESULTS:  Lab Results   Component Value Date    AST 15 10/10/2023    AST 21 12/09/2020    ALT 30 10/10/2023    ALT 50 12/09/2020     CBC RESULTS:  Lab Results   Component Value Date    WBC 10.4 10/11/2023    WBC 8.6 12/09/2020    RBC 4.53 10/11/2023    RBC 4.32 12/09/2020    HGB 13.7 10/11/2023    HGB 13.2 12/09/2020    HCT 40.7 10/11/2023    HCT 39.4 12/09/2020    MCV 90 10/11/2023    MCV 91 12/09/2020    MCH 30.2 10/11/2023    MCH 30.6 12/09/2020    MCHC 33.7 10/11/2023    MCHC 33.5 12/09/2020    RDW 12.6 10/11/2023    RDW 12.6 12/09/2020     10/11/2023     12/09/2020     BMP RESULTS:  Lab Results   Component Value Date     11/13/2023     12/09/2020    POTASSIUM 3.9 11/13/2023    POTASSIUM 4.2 02/23/2022    POTASSIUM 3.9 12/09/2020    CHLORIDE 104 11/13/2023    CHLORIDE 107 02/23/2022    CHLORIDE 106 12/09/2020    CO2 26 11/13/2023    CO2 23 02/23/2022    CO2 27 12/09/2020    ANIONGAP 11 11/13/2023    ANIONGAP 9 02/23/2022    ANIONGAP 6 12/09/2020     (H) 11/13/2023     (H) 02/23/2022     (H) 12/09/2020    BUN 18.3 11/13/2023    BUN 13  "02/23/2022    BUN 12 12/09/2020    CR 0.89 11/13/2023    CR 0.84 12/09/2020    GFRESTIMATED 72 11/13/2023    GFRESTIMATED 75 12/09/2020    GFRESTBLACK 87 12/09/2020    DIANNA 9.7 11/13/2023    DIANNA 9.6 12/09/2020      A1C RESULTS:  Lab Results   Component Value Date    A1C 5.6 10/10/2023    A1C 5.7 (H) 12/21/2018     INR RESULTS:  No results found for: \"INR\"         Medications     Current Outpatient Medications   Medication Sig Dispense Refill    apixaban ANTICOAGULANT (ELIQUIS ANTICOAGULANT) 5 MG tablet Take 1 tablet (5 mg) by mouth 2 times daily 60 tablet 2    aspirin 81 MG EC tablet Take 1 tablet (81 mg) by mouth daily 30 tablet 0    budesonide (PULMICORT FLEXHALER) 180 MCG/ACT inhaler INHALE 2 PUFFS INTO THE LUNGS TWICE DAILY Due for appointment. Please schedule: [phone] 3 Inhaler 1    carvedilol (COREG) 6.25 MG tablet Take 1.5 tablets (9.375 mg) by mouth 2 times daily (with meals) 270 tablet 3    cetirizine (ZYRTEC) 10 MG tablet Take 10 mg by mouth daily      cyclobenzaprine (FLEXERIL) 5 MG tablet TAKE 1 TABLET(5 MG) BY MOUTH THREE TIMES DAILY AS NEEDED FOR MUSCLE SPASMS 42 tablet 11    furosemide (LASIX) 40 MG tablet Take 1 tablet (40 mg) by mouth daily 90 tablet 0    guaiFENesin (MUCINEX) 600 MG 12 hr tablet Take 1,200 mg by mouth 2 times daily as needed for congestion      levalbuterol (XOPENEX) 0.63 MG/3ML neb solution Take 3 mLs (0.63 mg) by nebulization every 6 hours as needed for shortness of breath or wheezing 90 mL 0    Levothyroxine Sodium (LEVOTHROID PO) Take 175 mcg by mouth daily      lisinopril (ZESTRIL) 2.5 MG tablet Take 1 tablet (2.5 mg) by mouth daily 90 tablet 0    melatonin 5 MG tablet Take 5 mg by mouth nightly as needed for sleep      rosuvastatin (CRESTOR) 5 MG tablet Take 1 tablet (5 mg) by mouth daily 90 tablet 0    spironolactone (ALDACTONE) 25 MG tablet Take 0.5 tablets (12.5 mg) by mouth daily 45 tablet 0    SUMAtriptan (IMITREX) 100 MG tablet TAKE 1 TABLET BY MOUTH AT ONSET OF " HEADACHE AS DIRECTED 9 tablet 11    vitamin D3 (CHOLECALCIFEROL) 50 mcg (2000 units) tablet Take 1 tablet by mouth daily            Past Medical History     Past Medical History:   Diagnosis Date    Asthma     Cellulitis     s/p I&D of wound    Hypertension     Hypothyroidism     Lower extremity edema     Melanoma (H) 2015    S/P appendectomy     S/P arthroscopic knee surgery     S/P  section     S/P lateral meniscectomy of right knee     S/P JOSEP (total abdominal hysterectomy)     Seasonal allergies      Past Surgical History:   Procedure Laterality Date    APPENDECTOMY      COLONOSCOPY N/A 2016    Procedure: COLONOSCOPY;  Surgeon: Axel Rivera MD;  Location:  GI    CV CORONARY ANGIOGRAM N/A 10/11/2023    Procedure: Coronary Angiogram;  Surgeon: Reinaldo Woodward MD;  Location:  HEART CARDIAC CATH LAB    CV LEFT HEART CATH N/A 10/11/2023    Procedure: Left Heart Catheterization;  Surgeon: Reinaldo Woodward MD;  Location:  HEART CARDIAC CATH LAB    CV LEFT VENTRICULOGRAM N/A 10/11/2023    Procedure: Left Ventriculogram;  Surgeon: Reinaldo Woodward MD;  Location:  HEART CARDIAC CATH LAB    HYSTERECTOMY, PAP NO LONGER INDICATED       Family History   Problem Relation Age of Onset    Hypertension Father     Thyroid Disease Mother         graves disease    Arthritis Mother         Rheumatoid    Osteoporosis Mother     Colon Cancer No family hx of             Allergies   Atorvastatin, Montelukast sodium, and Pravastatin      45 minutes spent on the date of the encounter doing chart review, history and exam, documentation and further activities as noted above    Yenny Stringer PA-C  [facility] [place]  Pager: [phone]      [facility]    Called [facility] pharmacy after assisting pt w/ Jardiance coupon online.  Unable to print coupon in exam room. Gave them below information and confirmed when pt picks up will be $10/month.           Armida  Jackson, RN DARÍON   [facility] [place], [place]  [facility] Care Coordinator  12/05/23, 3:52 PM        Thank you for allowing me to participate in the care of your patient.      Sincerely,     Yenny Stringer PA-C     [facility]  cc:   Jong Castro, DO  [address] [place] [address]

## 2023-12-05 NOTE — PROGRESS NOTES
[facility]    Called [facility] pharmacy after assisting pt w/ Jardiance coupon online.  Unable to print coupon in exam room. Gave them below information and confirmed when pt picks up will be $10/month.           Armida Paz, RN BSN   [facility] [place], [place]  [facility] Care Coordinator  12/05/23, 3:52 PM

## 2023-12-05 NOTE — PROGRESS NOTES
Cardiology Clinic Progress Note  Rhoda Ayala MRN# [id number]   YOB: 1960 Age: 63 year old   Primary Cardiologist: Dr. Leobardo Figueredo Reason for visit: CORE enrollment            Assessment and Plan:   Rhoda Ayala is a very pleasant 63 year old female who is here today for CORE enrollment.      1.  HFrEF and nonischemic cardiomyopathy  - LVEF: 20-25% on echo 10/2023, improved to 35-40% on echo 11/2023  - Etiology: nonischemic  - Fluid status: euvolemic  - Diuretic regimen: Lasix 40 mg once daily  - Ischemic evaluation: Cor angio 10/11/2023 revealed mild nonobstructive CAD  - Guideline directed medical therapy:  - Beta blocker: Carvedilol 9.375 mg twice daily  - ACEI/ARB/ARNI: Lisinopril 2.5 mg once daily  - Aldactone antagonist: Spironolactone 12.5 mg once daily  - SGLT2 inhibitor: Start Jardiance 10 mg once daily  -Counseled patient on sodium restriction  2.  Paroxysmal atrial arrhythmias including atrial fibrillation and SVT.  On Eliquis 5 mg twice daily for cardioembolic risk reduction.  Perform 7-day Zio patch monitor to evaluate frequency/duration of AFIB and SVT episodes on current dose of beta-blocker.  Has EP evaluation set up 2/7/2023  3.  Hypertension, BP well-controlled on current regimen  4.  Mild nonobstructive coronary artery disease by coronary angio 10/11/2023.  On statin  5.  Hypothyroidism, on levothyroxine    Changes today:   Start Jardiance 10 mg once daily.  Patient is eligible for co-pay card from [email or web address] to reduce cost of drug to $10 per month.  I helped her obtain this in clinic today.    Ms. Ayala has been feeling well since discharge from the hospital.  Denies any symptoms concerning for angina or heart failure.  She is tolerating all of her cardiac medications well without apparent side effect.  In order to optimize her GDMT regimen further, I will start her on Jardiance 10 mg once daily.     She completed a cardiac event monitor which revealed evidence of atrial  fibrillation and was subsequently started on Eliquis 5 mg twice daily for cardioembolic risk reduction.  Unfortunately, with this type of monitor, we are not able to determine duration of atrial fibrillation episodes as well as average rates during episodes.  As such, I recommend completion of a 7-day Zio patch monitor to evaluate burden of atrial fibrillation as well as ventricular rates while in A-fib on current dose of carvedilol.    EP consultation as scheduled 2/2024.    CORE follow-up with me in 1 month with labs prior.    Echocardiogram in 3 months with subsequent Dr. Leobardo Figueredo follow-up.    Yenny Stringer PA-C  [facility]  Pager: [phone]          History of Presenting Illness:    Rhoda Ayala is a very pleasant 63 year old female with a history of hypothyroidism and hypertension.     Patient presented to [facility] ED 10/10/2023 with exertional shortness of breath and chest discomfort.  ECG on admission showed NSR with nonspecific T wave changes. Labs notable for elevated NT proBNP at 3228, troponin mildly elevated at 29 and flat.  Normal renal function and electrolytes.  Blood counts normal with exception of mildly elevated WBC at 11.6.  Echocardiogram 10/10/2023 revealed severely decreased LV systolic function with LVEF 20 to 25%, moderately dilated LV with severe global hypokinesia of the LV.  RV was normal with respect to size and function.  LA moderately dilated.  2+ MR was also noted.  Coronary angiogram was completed 10/11/2023 that revealed mild nonobstructive coronary artery disease.  Patient was having recurrent episodes of SVT during the case that did not resolve with Valsalva but did resolve with carotid sinus massage.  She was diuresed and medications were optimized. At time of discharge, patient was started on Lasix 40 mg once daily.  Referral to electrophysiology was recommended given evidence of AVNRT during her admission as well as cardiac event monitor  "placement at time of discharge.    Dr. Claros started patient on Eliquis 5 mg twice daily and uptitrated carvedilol to 9.375 mg twice daily after cardiac monitor revealed evidence of atrial fibrillation 11/13/2023. Ms. Ayala had a repeat echocardiogram 11/13/2023 that showed LVEF 35-40% with moderate global hypokinesia of the LV, normal RV size and function, and mild to moderate MR (1-2+).     Most recent lab work completed 11/13/2023 revealed normal renal function and electrolytes.  NT proBNP elevated at 1192, though improved when compared to prior    Patient is here today for CORE enrollment/hospital follow up. She has been feeling well since discharge from the hospital. Denies chest pain, SOB, and palpitations. She did have some \"flutters\" in her chest that occurred while she was wearing her event monitor but reports these resolved after her carvedilol was uptitrated to 9.375 mg BID. No lightheadedness, dizziness, near-syncope or syncope.  No orthopnea or PND.    Her weights have been steadily dropping over the last 1 to 2 months, 206>>198#.  She is watching the sodium in her diet very closely.  Reports she has been bradycardic her entire life with heart rate 60s.    Takes all medications daily as prescribed.  Works out with a  2 times per month.  In addition to this, she walks on the treadmill, uses the elliptical, and perform some light weight training.  Very rare alcohol consumption, maybe once yearly.  No illicit drug use.  No tobacco use.      Social History       Social History     Socioeconomic History    Marital status: Single     Spouse name: Not on file    Number of children: Not on file    Years of education: Not on file    Highest education level: Not on file   Occupational History    Not on file   Tobacco Use    Smoking status: Light Smoker     Packs/day: 0.25     Years: 3.00     Additional pack years: 0.00     Total pack years: 0.75     Types: Cigarettes    Smokeless tobacco: Never " "  Substance and Sexual Activity    Alcohol use: Yes     Alcohol/week: 0.0 standard drinks of alcohol     Comment: rare use, 3-4 x per year    Drug use: No    Sexual activity: Yes     Partners: Male   Other Topics Concern    Parent/sibling w/ CABG, MI or angioplasty before 65F 55M? Not Asked   Social History Narrative    Not on file     Social Determinants of Health     Financial Resource Strain: Low Risk  (10/18/2023)    Financial Resource Strain     Within the past 12 months, have you or your family members you live with been unable to get utilities (heat, electricity) when it was really needed?: No   Food Insecurity: Low Risk  (10/18/2023)    Food Insecurity     Within the past 12 months, did you worry that your food would run out before you got money to buy more?: No     Within the past 12 months, did the food you bought just not last and you didn t have money to get more?: No   Transportation Needs: Low Risk  (10/18/2023)    Transportation Needs     Within the past 12 months, has lack of transportation kept you from medical appointments, getting your medicines, non-medical meetings or appointments, work, or from getting things that you need?: No   Physical Activity: Not on file   Stress: Not on file   Social Connections: Not on file   Interpersonal Safety: Not on file   Housing Stability: High Risk (10/18/2023)    Housing Stability     Do you have housing? : No     Are you worried about losing your housing?: No            Review of Systems:   Please see HPI         Physical Exam:   Vitals: /80 (BP Location: Right arm, Patient Position: Sitting, Cuff Size: Adult Large)   Pulse 62   Ht 1.702 m (5' 7\")   Wt 90.5 kg (199 lb 8 oz)   SpO2 97%   BMI 31.25 kg/m     Wt Readings from Last 4 Encounters:   10/19/23 92.1 kg (203 lb)   10/12/23 91.3 kg (201 lb 3.2 oz)   09/21/22 91.6 kg (202 lb)   02/23/22 90.3 kg (199 lb)     GEN: well nourished, in no acute distress.  HEENT:  Pupils equal, round. Sclerae " nonicteric.   NECK: Supple, no masses appreciated. No JVD  C/V:  Regular rate and rhythm, no murmur, rub or gallop.    RESP: Respirations are unlabored. Clear to auscultation bilaterally without wheezing, rales, or rhonchi.  GI: Abdomen soft, nontender.  EXTREM: no LE edema.  NEURO: Alert and oriented, cooperative.  SKIN: Warm and dry.        Data:   LIPID RESULTS:  Lab Results   Component Value Date    CHOL 189 10/10/2023    CHOL 154 12/09/2020    HDL 42 (L) 10/10/2023    HDL 34 (L) 12/09/2020     (H) 10/10/2023    LDL 65 12/09/2020    TRIG 216 (H) 10/10/2023    TRIG 276 (H) 12/09/2020    CHOLHDLRATIO 7.0 (H) 06/16/2014     LIVER ENZYME RESULTS:  Lab Results   Component Value Date    AST 15 10/10/2023    AST 21 12/09/2020    ALT 30 10/10/2023    ALT 50 12/09/2020     CBC RESULTS:  Lab Results   Component Value Date    WBC 10.4 10/11/2023    WBC 8.6 12/09/2020    RBC 4.53 10/11/2023    RBC 4.32 12/09/2020    HGB 13.7 10/11/2023    HGB 13.2 12/09/2020    HCT 40.7 10/11/2023    HCT 39.4 12/09/2020    MCV 90 10/11/2023    MCV 91 12/09/2020    MCH 30.2 10/11/2023    MCH 30.6 12/09/2020    MCHC 33.7 10/11/2023    MCHC 33.5 12/09/2020    RDW 12.6 10/11/2023    RDW 12.6 12/09/2020     10/11/2023     12/09/2020     BMP RESULTS:  Lab Results   Component Value Date     11/13/2023     12/09/2020    POTASSIUM 3.9 11/13/2023    POTASSIUM 4.2 02/23/2022    POTASSIUM 3.9 12/09/2020    CHLORIDE 104 11/13/2023    CHLORIDE 107 02/23/2022    CHLORIDE 106 12/09/2020    CO2 26 11/13/2023    CO2 23 02/23/2022    CO2 27 12/09/2020    ANIONGAP 11 11/13/2023    ANIONGAP 9 02/23/2022    ANIONGAP 6 12/09/2020     (H) 11/13/2023     (H) 02/23/2022     (H) 12/09/2020    BUN 18.3 11/13/2023    BUN 13 02/23/2022    BUN 12 12/09/2020    CR 0.89 11/13/2023    CR 0.84 12/09/2020    GFRESTIMATED 72 11/13/2023    GFRESTIMATED 75 12/09/2020    GFRESTBLACK 87 12/09/2020    DIANNA 9.7 11/13/2023    DIANNA  "9.6 12/09/2020      A1C RESULTS:  Lab Results   Component Value Date    A1C 5.6 10/10/2023    A1C 5.7 (H) 12/21/2018     INR RESULTS:  No results found for: \"INR\"         Medications     Current Outpatient Medications   Medication Sig Dispense Refill    apixaban ANTICOAGULANT (ELIQUIS ANTICOAGULANT) 5 MG tablet Take 1 tablet (5 mg) by mouth 2 times daily 60 tablet 2    aspirin 81 MG EC tablet Take 1 tablet (81 mg) by mouth daily 30 tablet 0    budesonide (PULMICORT FLEXHALER) 180 MCG/ACT inhaler INHALE 2 PUFFS INTO THE LUNGS TWICE DAILY Due for appointment. Please schedule: [phone] 3 Inhaler 1    carvedilol (COREG) 6.25 MG tablet Take 1.5 tablets (9.375 mg) by mouth 2 times daily (with meals) 270 tablet 3    cetirizine (ZYRTEC) 10 MG tablet Take 10 mg by mouth daily      cyclobenzaprine (FLEXERIL) 5 MG tablet TAKE 1 TABLET(5 MG) BY MOUTH THREE TIMES DAILY AS NEEDED FOR MUSCLE SPASMS 42 tablet 11    furosemide (LASIX) 40 MG tablet Take 1 tablet (40 mg) by mouth daily 90 tablet 0    guaiFENesin (MUCINEX) 600 MG 12 hr tablet Take 1,200 mg by mouth 2 times daily as needed for congestion      levalbuterol (XOPENEX) 0.63 MG/3ML neb solution Take 3 mLs (0.63 mg) by nebulization every 6 hours as needed for shortness of breath or wheezing 90 mL 0    Levothyroxine Sodium (LEVOTHROID PO) Take 175 mcg by mouth daily      lisinopril (ZESTRIL) 2.5 MG tablet Take 1 tablet (2.5 mg) by mouth daily 90 tablet 0    melatonin 5 MG tablet Take 5 mg by mouth nightly as needed for sleep      rosuvastatin (CRESTOR) 5 MG tablet Take 1 tablet (5 mg) by mouth daily 90 tablet 0    spironolactone (ALDACTONE) 25 MG tablet Take 0.5 tablets (12.5 mg) by mouth daily 45 tablet 0    SUMAtriptan (IMITREX) 100 MG tablet TAKE 1 TABLET BY MOUTH AT ONSET OF HEADACHE AS DIRECTED 9 tablet 11    vitamin D3 (CHOLECALCIFEROL) 50 mcg (2000 units) tablet Take 1 tablet by mouth daily            Past Medical History     Past Medical History:   Diagnosis Date "    Asthma     Cellulitis     s/p I&D of wound    Hypertension     Hypothyroidism     Lower extremity edema     Melanoma (H) 2015    S/P appendectomy     S/P arthroscopic knee surgery     S/P  section     S/P lateral meniscectomy of right knee     S/P JOSEP (total abdominal hysterectomy)     Seasonal allergies      Past Surgical History:   Procedure Laterality Date    APPENDECTOMY      COLONOSCOPY N/A 2016    Procedure: COLONOSCOPY;  Surgeon: Axel Rivera MD;  Location: [facility]    CV CORONARY ANGIOGRAM N/A 10/11/2023    Procedure: Coronary Angiogram;  Surgeon: Reinaldo Woodward MD;  Location:  [facility]ARD[facility] CATH LAB    CV LEFT HEART CATH N/A 10/11/2023    Procedure: Left Heart Catheterization;  Surgeon: Reinaldo Woodward MD;  Location:  HEART [facility]ARD[facility] CATH LAB    CV LEFT VENTRICULOGRAM N/A 10/11/2023    Procedure: Left Ventriculogram;  Surgeon: Reinaldo Woodward MD;  Location: [facility]    HYSTERECTOMY, PAP NO LONGER INDICATED       Family History   Problem Relation Age of Onset    Hypertension Father     Thyroid Disease Mother         graves disease    Arthritis Mother         Rheumatoid    Osteoporosis Mother     Colon Cancer No family hx of             Allergies   Atorvastatin, Montelukast sodium, and Pravastatin      45 minutes spent on the date of the encounter doing chart review, history and exam, documentation and further activities as noted above    Yenny Stringer PA-C  [facility]  Pager: [phone]

## 2023-12-11 DIAGNOSIS — I50.23 ACUTE ON CHRONIC SYSTOLIC HEART FAILURE (H): Primary | ICD-10-CM

## 2023-12-26 ENCOUNTER — OFFICE VISIT (OUTPATIENT)
Dept: FAMILY MEDICINE | Facility: CLINIC | Age: 63
End: 2023-12-26
Payer: COMMERCIAL

## 2023-12-26 VITALS
TEMPERATURE: 96.9 F | DIASTOLIC BLOOD PRESSURE: 70 MMHG | HEART RATE: 65 BPM | BODY MASS INDEX: 31.47 KG/M2 | WEIGHT: 195.8 LBS | SYSTOLIC BLOOD PRESSURE: 126 MMHG | OXYGEN SATURATION: 97 % | RESPIRATION RATE: 15 BRPM | HEIGHT: 66 IN

## 2023-12-26 DIAGNOSIS — I50.21 ACUTE SYSTOLIC CONGESTIVE HEART FAILURE (H): ICD-10-CM

## 2023-12-26 DIAGNOSIS — R73.01 IFG (IMPAIRED FASTING GLUCOSE): ICD-10-CM

## 2023-12-26 DIAGNOSIS — M54.9 UPPER BACK PAIN ON RIGHT SIDE: ICD-10-CM

## 2023-12-26 DIAGNOSIS — I48.92 ATRIAL FLUTTER, UNSPECIFIED TYPE (H): ICD-10-CM

## 2023-12-26 DIAGNOSIS — Z13.6 CARDIOVASCULAR SCREENING; LDL GOAL LESS THAN 130: ICD-10-CM

## 2023-12-26 DIAGNOSIS — J45.31 EXTRINSIC ASTHMA, MILD PERSISTENT, WITH ACUTE EXACERBATION: ICD-10-CM

## 2023-12-26 DIAGNOSIS — M79.602 ARM PAIN, LEFT: ICD-10-CM

## 2023-12-26 DIAGNOSIS — G43.009 MIGRAINE WITHOUT AURA AND WITHOUT STATUS MIGRAINOSUS, NOT INTRACTABLE: ICD-10-CM

## 2023-12-26 DIAGNOSIS — Z00.00 ROUTINE GENERAL MEDICAL EXAMINATION AT A HEALTH CARE FACILITY: Primary | ICD-10-CM

## 2023-12-26 DIAGNOSIS — E03.9 HYPOTHYROIDISM, UNSPECIFIED TYPE: ICD-10-CM

## 2023-12-26 DIAGNOSIS — I50.23 ACUTE ON CHRONIC SYSTOLIC HEART FAILURE (H): ICD-10-CM

## 2023-12-26 DIAGNOSIS — I42.8 NONISCHEMIC CARDIOMYOPATHY (H): ICD-10-CM

## 2023-12-26 LAB
ALBUMIN SERPL BCG-MCNC: 4.6 G/DL (ref 3.5–5.2)
ALP SERPL-CCNC: 101 U/L (ref 40–150)
ALT SERPL W P-5'-P-CCNC: 28 U/L (ref 0–50)
ANION GAP SERPL CALCULATED.3IONS-SCNC: 10 MMOL/L (ref 7–15)
AST SERPL W P-5'-P-CCNC: 24 U/L (ref 0–45)
BILIRUB SERPL-MCNC: 0.4 MG/DL
BUN SERPL-MCNC: 17.5 MG/DL (ref 8–23)
CALCIUM SERPL-MCNC: 10 MG/DL (ref 8.8–10.2)
CHLORIDE SERPL-SCNC: 103 MMOL/L (ref 98–107)
CHOLEST SERPL-MCNC: 153 MG/DL
CREAT SERPL-MCNC: 0.95 MG/DL (ref 0.51–0.95)
DEPRECATED HCO3 PLAS-SCNC: 27 MMOL/L (ref 22–29)
EGFRCR SERPLBLD CKD-EPI 2021: 66 ML/MIN/1.73M2
ERYTHROCYTE [DISTWIDTH] IN BLOOD BY AUTOMATED COUNT: 11.9 % (ref 10–15)
FASTING STATUS PATIENT QL REPORTED: YES
GLUCOSE SERPL-MCNC: 107 MG/DL (ref 70–99)
HBA1C MFR BLD: 5.4 % (ref 0–5.6)
HCT VFR BLD AUTO: 40.9 % (ref 35–47)
HDLC SERPL-MCNC: 42 MG/DL
HGB BLD-MCNC: 13.5 G/DL (ref 11.7–15.7)
LDLC SERPL CALC-MCNC: 81 MG/DL
MCH RBC QN AUTO: 29.9 PG (ref 26.5–33)
MCHC RBC AUTO-ENTMCNC: 33 G/DL (ref 31.5–36.5)
MCV RBC AUTO: 91 FL (ref 78–100)
NONHDLC SERPL-MCNC: 111 MG/DL
PLATELET # BLD AUTO: 282 10E3/UL (ref 150–450)
POTASSIUM SERPL-SCNC: 4 MMOL/L (ref 3.4–5.3)
PROT SERPL-MCNC: 7.4 G/DL (ref 6.4–8.3)
RBC # BLD AUTO: 4.52 10E6/UL (ref 3.8–5.2)
SODIUM SERPL-SCNC: 140 MMOL/L (ref 135–145)
T4 FREE SERPL-MCNC: 2.02 NG/DL (ref 0.9–1.7)
TRIGL SERPL-MCNC: 151 MG/DL
TSH SERPL DL<=0.005 MIU/L-ACNC: 0.13 UIU/ML (ref 0.3–4.2)
VIT D+METAB SERPL-MCNC: 55 NG/ML (ref 20–50)
WBC # BLD AUTO: 8.3 10E3/UL (ref 4–11)

## 2023-12-26 PROCEDURE — 85027 COMPLETE CBC AUTOMATED: CPT | Performed by: INTERNAL MEDICINE

## 2023-12-26 PROCEDURE — 80053 COMPREHEN METABOLIC PANEL: CPT | Performed by: INTERNAL MEDICINE

## 2023-12-26 PROCEDURE — 83036 HEMOGLOBIN GLYCOSYLATED A1C: CPT | Performed by: INTERNAL MEDICINE

## 2023-12-26 PROCEDURE — 80061 LIPID PANEL: CPT | Performed by: INTERNAL MEDICINE

## 2023-12-26 PROCEDURE — 99214 OFFICE O/P EST MOD 30 MIN: CPT | Mod: 25 | Performed by: INTERNAL MEDICINE

## 2023-12-26 PROCEDURE — 91320 SARSCV2 VAC 30MCG TRS-SUC IM: CPT | Performed by: INTERNAL MEDICINE

## 2023-12-26 PROCEDURE — 82306 VITAMIN D 25 HYDROXY: CPT | Performed by: INTERNAL MEDICINE

## 2023-12-26 PROCEDURE — 84439 ASSAY OF FREE THYROXINE: CPT | Performed by: INTERNAL MEDICINE

## 2023-12-26 PROCEDURE — 99397 PER PM REEVAL EST PAT 65+ YR: CPT | Mod: 25 | Performed by: INTERNAL MEDICINE

## 2023-12-26 PROCEDURE — 36415 COLL VENOUS BLD VENIPUNCTURE: CPT | Performed by: INTERNAL MEDICINE

## 2023-12-26 PROCEDURE — 90480 ADMN SARSCOV2 VAC 1/ONLY CMP: CPT | Performed by: INTERNAL MEDICINE

## 2023-12-26 PROCEDURE — 84443 ASSAY THYROID STIM HORMONE: CPT | Performed by: INTERNAL MEDICINE

## 2023-12-26 RX ORDER — CYCLOBENZAPRINE HCL 5 MG
5 TABLET ORAL 3 TIMES DAILY PRN
Qty: 42 TABLET | Refills: 11 | Status: SHIPPED | OUTPATIENT
Start: 2023-12-26

## 2023-12-26 RX ORDER — BUDESONIDE 180 UG/1
AEROSOL, POWDER RESPIRATORY (INHALATION)
Qty: 1 EACH | Refills: 11 | Status: SHIPPED | OUTPATIENT
Start: 2023-12-26

## 2023-12-26 RX ORDER — SUMATRIPTAN 100 MG/1
TABLET, FILM COATED ORAL
Qty: 9 TABLET | Refills: 11 | Status: SHIPPED | OUTPATIENT
Start: 2023-12-26

## 2023-12-26 ASSESSMENT — ENCOUNTER SYMPTOMS
CONSTIPATION: 0
NERVOUS/ANXIOUS: 0
HEARTBURN: 0
SHORTNESS OF BREATH: 0
CHILLS: 0
DIZZINESS: 0
HEMATURIA: 0
JOINT SWELLING: 0
DIARRHEA: 0
DYSURIA: 0
BREAST MASS: 0
NAUSEA: 0
EYE PAIN: 0
HEMATOCHEZIA: 0
ABDOMINAL PAIN: 0
FEVER: 0
FREQUENCY: 0
HEADACHES: 0
PARESTHESIAS: 0
MYALGIAS: 0
WEAKNESS: 0
COUGH: 0
SORE THROAT: 0
PALPITATIONS: 0
ARTHRALGIAS: 0

## 2023-12-26 ASSESSMENT — ACTIVITIES OF DAILY LIVING (ADL): CURRENT_FUNCTION: NO ASSISTANCE NEEDED

## 2023-12-26 ASSESSMENT — ASTHMA QUESTIONNAIRES: ACT_TOTALSCORE: 24

## 2023-12-26 ASSESSMENT — PAIN SCALES - GENERAL: PAINLEVEL: NO PAIN (0)

## 2023-12-26 NOTE — PROGRESS NOTES
"SUBJECTIVE:   Rhoda is a 63 year old, presenting for the following:  Physical      Are you in the first 12 months of your Medicare coverage?  No    Healthy Habits:     In general, how would you rate your overall health?  Excellent    Frequency of exercise:  2-3 days/week    Duration of exercise:  30-45 minutes    Do you usually eat at least 4 servings of fruit and vegetables a day, include whole grains    & fiber and avoid regularly eating high fat or \"junk\" foods?  Yes    Taking medications regularly:  Yes    Medication side effects:  None    Ability to successfully perform activities of daily living:  No assistance needed    Home Safety:  No safety concerns identified    Hearing Impairment:  No hearing concerns    In the past 6 months, have you been bothered by leaking of urine?  No    In general, how would you rate your overall mental or emotional health?  Excellent    Additional concerns today:  No          Have you ever done Advance Care Planning? (For example, a Health Directive, POLST, or a discussion with a medical provider or your loved ones about your wishes): No, advance care planning information given to patient to review.  Patient plans to discuss their wishes with loved ones or provider.         Fall risk  Fallen 2 or more times in the past year?: No  Any fall with injury in the past year?: No  click delete button to remove this line now  Cognitive Screening   1) Repeat 3 items (Leader, Season, Table)    2) Clock draw: NORMAL  3) 3 item recall: Recalls 3 objects  Results: 3 items recalled: COGNITIVE IMPAIRMENT LESS LIKELY    Mini-CogTM Copyright BETH Deluna. Licensed by the author for use in [facility]; reprinted with permission (merle@.[facility]). All rights reserved.      Do you have sleep apnea, excessive snoring or daytime drowsiness? : no    Reviewed and updated as needed this visit by clinical staff                  Reviewed and updated as needed this visit by Provider               "   Social History     Tobacco Use    Smoking status: Former     Packs/day: 0.25     Years: 3.00     Additional pack years: 0.00     Total pack years: 0.75     Types: Cigarettes     Start date: 10/1/2019     Quit date: 10/1/2023     Years since quittin.2    Smokeless tobacco: Never   Substance Use Topics    Alcohol use: Yes     Alcohol/week: 0.0 standard drinks of alcohol     Comment: rare use, 3-4 x per year             2023     8:47 AM   Alcohol Use   Prescreen: >3 drinks/day or >7 drinks/week? No          No data to display              Do you have a current opioid prescription? No  Do you use any other controlled substances or medications that are not prescribed by a provider? None    Current providers sharing in care for this patient include:   Patient Care Team:  Fili Vasquez MD as PCP - General (Internal Medicine)  Fili Vasquez MD as Assigned PCP  Radha Moss PA-C as Physician Assistant (Cardiovascular Disease)  Parker Claros MD as MD (Cardiovascular Disease)  Sakshi Carrasco, RN as Registered Nurse (Cardiology)  Yenny Stringer PA-C as Physician Assistant (Cardiology)  Armida Paz, RN as Registered Nurse (Cardiology)    The following health maintenance items are reviewed in Epic and correct as of today:  Health Maintenance   Topic Date Due    HF ACTION PLAN  Never done    MIGRAINE ACTION PLAN  Never done    ZOSTER IMMUNIZATION (2 of 3) 2013    IPV IMMUNIZATION (3 of 3 - Adult catch-up series) 03/15/2014    RSV VACCINE (Pregnancy & 60+) (1 - 1-dose 60+ series) Never done    Pneumococcal Vaccine: 65+ Years (2 of 2 - PCV) 2021    ASTHMA ACTION PLAN  2021    ANNUAL REVIEW OF HM ORDERS  2023    DTAP/TDAP/TD IMMUNIZATION (2 - Td or Tdap) 2023    COVID-19 Vaccine ( - - season) 2023    MEDICARE ANNUAL WELLNESS VISIT  2023    BMP  2024    ASTHMA CONTROL TEST  2024    ALT  10/10/2024    LIPID   10/10/2024    TSH W/FREE T4 REFLEX  10/10/2024    LUNG CANCER SCREENING  10/10/2024    CBC  10/11/2024    FALL RISK ASSESSMENT  12/26/2024    MAMMO SCREENING  05/16/2025    COLORECTAL CANCER SCREENING  01/21/2026    ADVANCE CARE PLANNING  02/23/2027    DEXA  11/10/2038    HEPATITIS C SCREENING  Completed    HIV SCREENING  Completed    PHQ-2 (once per calendar year)  Completed    INFLUENZA VACCINE  Completed    HPV IMMUNIZATION  Aged Out    MENINGITIS IMMUNIZATION  Aged Out    RSV MONOCLONAL ANTIBODY  Aged Out    PAP  Discontinued     Lab work is in process  Labs reviewed in [facility]  Mammogram Screening: Mammogram Screening - Patient over age 75, has elected to continue with screening.        12/23/2023     8:51 AM   Breast CA Risk Assessment (FHS-7)   Do you have a family history of breast, colon, or ovarian cancer? No / Unknown     Review of Systems   Constitutional:  Negative for chills and fever.   HENT:  Positive for congestion. Negative for ear pain, hearing loss and sore throat.    Eyes:  Negative for pain and visual disturbance.   Respiratory:  Negative for cough and shortness of breath.    Cardiovascular:  Negative for chest pain, palpitations and peripheral edema.   Gastrointestinal:  Negative for abdominal pain, constipation, diarrhea, heartburn, hematochezia and nausea.   Breasts:  Negative for tenderness, breast mass and discharge.   Genitourinary:  Negative for dysuria, frequency, genital sores, hematuria, pelvic pain, urgency, vaginal bleeding and vaginal discharge.   Musculoskeletal:  Negative for arthralgias, joint swelling and myalgias.   Skin:  Negative for rash.   Neurological:  Negative for dizziness, weakness, headaches and paresthesias.   Psychiatric/Behavioral:  Negative for mood changes. The patient is not nervous/anxious.         Nonischemic cardiomyopathy (H)   Recent follow up in cardiology and doing very well with current use of diuretics and sodium restriction  IFG (impaired fasting  "glucose)   Due for recehck of hemoglobin A1c today.  Weight has improved - down 10 pounds over the past 2 months  Hypothyroidism, unspecified type   Has had follow up in endocrinology and continues on levothyroxine  Atrial flutter (H)   Has done well with apixaban for stroke prevention\  Upper back pain on right side   Requests refill of cyclobenzaprine to have available  Extrinsic asthma, mild persistent, with acute exacerbation   Doing well with pulmicort 2 puff twice per day   Migraine without aura and without status migrainosus, not intractable    Has had less frequent migraine headache.  Manageable with Imitrex     OBJECTIVE:   /70 (BP Location: Right arm, Patient Position: Sitting, Cuff Size: Adult Regular)   Pulse 65   Temp 96.9  F (36.1  C) (Tympanic)   Resp 15   Ht 1.688 m (5' 6.46\")   Wt 88.8 kg (195 lb 12.8 oz)   SpO2 97%   BMI 31.17 kg/m   Estimated body mass index is 31.17 kg/m  as calculated from the following:    Height as of this encounter: 1.688 m (5' 6.46\").    Weight as of this encounter: 88.8 kg (195 lb 12.8 oz).  Physical Exam  GENERAL: healthy, alert and no distress  EYES: Eyes grossly normal to inspection, PERRL and conjunctivae and sclerae normal  HENT: ear canals and TM's normal, nose and mouth without ulcers or lesions  NECK: no adenopathy, no asymmetry, masses, or scars and thyroid normal to palpation  RESP: lungs clear to auscultation - no rales, rhonchi or wheezes  CV: regular rate and rhythm, normal S1 S2, no S3 or S4, no murmur, click or rub, no peripheral edema and peripheral pulses strong  ABDOMEN: soft, nontender, no hepatosplenomegaly, no masses and bowel sounds normal  MS: no gross musculoskeletal defects noted, no edema  SKIN: no suspicious lesions or rashes  NEURO: Normal strength and tone, mentation intact and speech normal  PSYCH: mentation appears normal, affect normal/bright    Diagnostic Test Results:  Labs reviewed in Epic    ASSESSMENT / PLAN:     Patient " Instructions   (Z00.00) Routine general medical examination at a health care facility  (primary encounter diagnosis)  Comment: For routine exam, we will draw labs as ordered, cholesterol, diabetes mellitus check, liver function, renal function, and plan for colonoscopy in 2026  We will also update vaccination history.  TDAP, PCV 20, RSV, Shingrix recommened through Pharmacy   Plan: Lipid panel reflex to direct LDL Non-fasting,         Comprehensive metabolic panel, CBC with         platelets, Hemoglobin A1c, TSH with free T4         reflex, Vitamin D Deficiency            (I42.8) Nonischemic cardiomyopathy (H)  Comment: Continue on current medications with follow up in cardiology for echocardiogram in March   Plan: Comprehensive metabolic panel, TSH with free T4        reflex, Vitamin D Deficiency            (R73.01) IFG (impaired fasting glucose)  Comment: Check hemoglobin A1c today  Plan: Hemoglobin A1c            (E03.9) Hypothyroidism, unspecified type  Comment: Recheck TSH and follow up in endocrinology   Plan: TSH with free T4 reflex            (I50.21) Acute systolic congestive heart failure (H)  Comment: Doing great.  Continue current medications   Plan:     (I50.23) Acute on chronic systolic heart failure (H)  Comment: as above   Plan:     (I48.92) Atrial flutter, unspecified type (H)  Comment: COntinue apixaban for stroke prevention   Plan: Vitamin D Deficiency            (M79.602) Arm pain, left  Comment: OK to refill cyclobenzaprin  Plan: cyclobenzaprine (FLEXERIL) 5 MG tablet            (M54.9) Upper back pain on right side  Comment: as above   Plan: cyclobenzaprine (FLEXERIL) 5 MG tablet            (J45.31) Extrinsic asthma, mild persistent, with acute exacerbation  Comment:   Plan: budesonide (PULMICORT FLEXHALER) 180 MCG/ACT         inhaler            (G43.009) Migraine without aura and without status migrainosus, not intractable  Comment: OK to refill imitrex to have available   Plan: SUMAtriptan  "(IMITREX) 100 MG tablet            (Z13.6) CARDIOVASCULAR SCREENING; LDL GOAL LESS THAN 130  Comment:   Plan: Lipid panel reflex to direct LDL Non-fasting                Patient has been advised of split billing requirements and indicates understanding: Yes      COUNSELING:  Reviewed preventive health counseling, as reflected in patient instructions      BMI:   Estimated body mass index is 31.25 kg/m  as calculated from the following:    Height as of 12/5/23: 1.702 m (5' 7\").    Weight as of 12/5/23: 90.5 kg (199 lb 8 oz).         She reports that she quit smoking about 2 months ago. Her smoking use included cigarettes. She started smoking about 4 years ago. She has a 0.8 pack-year smoking history. She has never used smokeless tobacco.      Appropriate preventive services were discussed with this patient, including applicable screening as appropriate for fall prevention, nutrition, physical activity, Tobacco-use cessation, weight loss and cognition.  Checklist reviewing preventive services available has been given to the patient.    Reviewed patients plan of care and provided an AVS. The Basic Care Plan (routine screening as documented in Health Maintenance) for Rhoda meets the Care Plan requirement. This Care Plan has been established and reviewed with the Patient.          Fili Vasquez MD, MD  [facility]    Identified Health Risks:  I have reviewed Opioid Use Disorder and Substance Use Disorder risk factors and made any needed referrals.   "

## 2023-12-26 NOTE — PATIENT INSTRUCTIONS
(Z00.00) Routine general medical examination at a health care facility  (primary encounter diagnosis)  Comment: For routine exam, we will draw labs as ordered, cholesterol, diabetes mellitus check, liver function, renal function, and plan for colonoscopy in 2026  We will also update vaccination history.  TDAP, PCV 20, RSV, Shingrix recommened through Pharmacy   Plan: Lipid panel reflex to direct LDL Non-fasting,         Comprehensive metabolic panel, CBC with         platelets, Hemoglobin A1c, TSH with free T4         reflex, Vitamin D Deficiency            (I42.8) Nonischemic cardiomyopathy (H)  Comment: Continue on current medications with follow up in cardiology for echocardiogram in March   Plan: Comprehensive metabolic panel, TSH with free T4        reflex, Vitamin D Deficiency            (R73.01) IFG (impaired fasting glucose)  Comment: Check hemoglobin A1c today  Plan: Hemoglobin A1c            (E03.9) Hypothyroidism, unspecified type  Comment: Recheck TSH and follow up in endocrinology   Plan: TSH with free T4 reflex            (I50.21) Acute systolic congestive heart failure (H)  Comment: Doing great.  Continue current medications   Plan:     (I50.23) Acute on chronic systolic heart failure (H)  Comment: as above   Plan:     (I48.92) Atrial flutter, unspecified type (H)  Comment: COntinue apixaban for stroke prevention   Plan: Vitamin D Deficiency            (M79.602) Arm pain, left  Comment: OK to refill cyclobenzaprin  Plan: cyclobenzaprine (FLEXERIL) 5 MG tablet            (M54.9) Upper back pain on right side  Comment: as above   Plan: cyclobenzaprine (FLEXERIL) 5 MG tablet            (J45.31) Extrinsic asthma, mild persistent, with acute exacerbation  Comment:   Plan: budesonide (PULMICORT FLEXHALER) 180 MCG/ACT         inhaler            (G43.009) Migraine without aura and without status migrainosus, not intractable  Comment: OK to refill imitrex to have available   Plan: SUMAtriptan (IMITREX) 100 MG  tablet            (Z13.6) CARDIOVASCULAR SCREENING; LDL GOAL LESS THAN 130  Comment:   Plan: Lipid panel reflex to direct LDL Non-fasting

## 2023-12-27 NOTE — RESULT ENCOUNTER NOTE
Onesimo Duong,    I had the opportunity to review your recent labs and a summary of your labs reads as follows:    Your complete blood counts show no sign of anemia, normal white blood cell count and platelets.  Your comprehensive metabolic panel showed normal renal function, normal liver function, and stable slightly elevated fasting blood glucose indicating no evidence of diabetes mellitus.  Your fasting lipid panel show  - normal HDL (good) cholesterol -as your goal is greater than 40  - low LDL (bad) cholesterol as your goal is less than 130  - stable triglyceride levels  Your vitamin D is stable  Your TSH is notably suppressed, and I recommend we recheck this again in about 6 weeks Before making any changes      Sincerely,  Fili Vasquez MD

## 2024-01-07 ENCOUNTER — LAB (OUTPATIENT)
Dept: LAB | Facility: CLINIC | Age: 64
End: 2024-01-07
Payer: COMMERCIAL

## 2024-01-07 DIAGNOSIS — I50.23 ACUTE ON CHRONIC SYSTOLIC HEART FAILURE (H): ICD-10-CM

## 2024-01-07 LAB
ANION GAP SERPL CALCULATED.3IONS-SCNC: 11 MMOL/L (ref 7–15)
BUN SERPL-MCNC: 13.9 MG/DL (ref 8–23)
CALCIUM SERPL-MCNC: 9.9 MG/DL (ref 8.8–10.2)
CHLORIDE SERPL-SCNC: 102 MMOL/L (ref 98–107)
CREAT SERPL-MCNC: 0.94 MG/DL (ref 0.51–0.95)
DEPRECATED HCO3 PLAS-SCNC: 26 MMOL/L (ref 22–29)
EGFRCR SERPLBLD CKD-EPI 2021: 67 ML/MIN/1.73M2
GLUCOSE SERPL-MCNC: 111 MG/DL (ref 70–99)
POTASSIUM SERPL-SCNC: 4.1 MMOL/L (ref 3.4–5.3)
SODIUM SERPL-SCNC: 139 MMOL/L (ref 135–145)

## 2024-01-07 PROCEDURE — 80048 BASIC METABOLIC PNL TOTAL CA: CPT

## 2024-01-07 PROCEDURE — 36415 COLL VENOUS BLD VENIPUNCTURE: CPT

## 2024-01-09 ENCOUNTER — OFFICE VISIT (OUTPATIENT)
Dept: CARDIOLOGY | Facility: CLINIC | Age: 64
End: 2024-01-09
Attending: INTERNAL MEDICINE
Payer: COMMERCIAL

## 2024-01-09 VITALS
HEART RATE: 56 BPM | DIASTOLIC BLOOD PRESSURE: 68 MMHG | HEIGHT: 67 IN | WEIGHT: 197.6 LBS | SYSTOLIC BLOOD PRESSURE: 102 MMHG | OXYGEN SATURATION: 96 % | BODY MASS INDEX: 31.01 KG/M2

## 2024-01-09 DIAGNOSIS — I50.21 ACUTE SYSTOLIC CONGESTIVE HEART FAILURE (H): ICD-10-CM

## 2024-01-09 DIAGNOSIS — I50.23 ACUTE ON CHRONIC SYSTOLIC HEART FAILURE (H): ICD-10-CM

## 2024-01-09 DIAGNOSIS — J45.31 EXTRINSIC ASTHMA, MILD PERSISTENT, WITH ACUTE EXACERBATION: ICD-10-CM

## 2024-01-09 PROCEDURE — 99214 OFFICE O/P EST MOD 30 MIN: CPT | Performed by: INTERNAL MEDICINE

## 2024-01-09 RX ORDER — FUROSEMIDE 40 MG
20 TABLET ORAL DAILY
COMMUNITY
Start: 2024-01-09 | End: 2024-01-26

## 2024-01-09 RX ORDER — CARVEDILOL 6.25 MG/1
12.5 TABLET ORAL 2 TIMES DAILY WITH MEALS
COMMUNITY
Start: 2024-01-09 | End: 2024-01-26

## 2024-01-09 NOTE — PROGRESS NOTES
Cardiology Clinic Progress Note  Rhoda Ayala MRN# [id number]   YOB: 1960 Age: 63 year old   Primary Cardiologist: Dr. Claros Reason for visit: CORE follow up            Assessment and Plan:   Rhoda Ayala is a very pleasant 63 year old female who is here today for CORE follow up.      1.  HFrEF and nonischemic cardiomyopathy  - LVEF: 20-25% on echo 10/2023, improved to 35-40% on echo 11/2023  - Etiology: nonischemic  - Fluid status: euvolemic  - Diuretic regimen: Decrease Lasix to 20 mg once daily  - Ischemic evaluation: Cor angio 10/11/2023 revealed mild nonobstructive CAD  - Guideline directed medical therapy:  - Beta blocker: Increase carvedilol to 12.5 mg twice daily  - ACEI/ARB/ARNI: Lisinopril 2.5 mg once daily  - Aldactone antagonist: Spironolactone 12.5 mg once daily  - SGLT2 inhibitor: Jardiance 10 mg once daily  -Counseled patient on sodium restriction  2.  Paroxysmal atrial arrhythmias including atrial fibrillation and SVT.  On Eliquis 5 mg twice daily for cardioembolic risk reduction.  Recent 7-day Zio patch monitor did not reveal any recurrent atrial fibrillation but did reveal frequent SVT.  Will uptitrate beta-blocker. Has EP evaluation set up 2/7/2023  3.  Hypertension, BP well-controlled on current regimen  4.  Mild nonobstructive coronary artery disease by coronary angio 10/11/2023.  On statin  5.  Hypothyroidism, on levothyroxine      Changes today:   Decrease Lasix to 20 mg once daily  Increase carvedilol to 12.5 mg twice daily    Ms. Ayala has been feeling remarkably well since the time of her last appointment.  Tolerating all cardiac medications well without apparent side effects.  Denies any symptoms concerning for angina or heart failure.  Recent 7-day Zio patch monitor revealed no evidence of recurrent atrial fibrillation but did show frequent SVT, with longest episode lasting 7 minutes and 30 seconds at an average rate of 122 bpm.  Average heart rate in NSR 74 bpm.  In  an attempt to lessen SVT burden, recommend up titration of beta-blocker to 12.5 mg twice daily. Her blood pressure is borderline low in clinic today (asymptomatic - no episodes of lightheadedness or dizziness).  Given stable weights, will trial decreasing Lasix to 20 mg once daily to allow for more blood pressure room.  Ms. Ayala will continue daily weights and contact the clinic for weight gain of 3 pounds in 1 day or 5 pounds 1 week.  I have also asked her to start monitoring her blood pressure intermittently at home.    Has EP evaluation set up 2/7/2023.    Follow-up with Dr. Leobardo Figueredo 3/18/2024 with an echocardiogram prior (3/11/2024)    [facility]  Pager: [phone]          History of Presenting Illness:    Rhoda Ayala is a very pleasant 63 year old female with a history of hypothyroidism and hypertension.      Patient presented to [facility] ED 10/10/2023 with exertional shortness of breath and chest discomfort.  ECG on admission showed NSR with nonspecific T wave changes. Labs notable for elevated NT proBNP at 3228, troponin mildly elevated at 29 and flat.  Normal renal function and electrolytes.  Blood counts normal with exception of mildly elevated WBC at 11.6.  Echocardiogram 10/10/2023 revealed severely decreased LV systolic function with LVEF 20 to 25%, moderately dilated LV with severe global hypokinesia of the LV.  RV was normal with respect to size and function.  LA moderately dilated.  2+ MR was also noted.  Coronary angiogram was completed 10/11/2023 that revealed mild nonobstructive coronary artery disease.  Patient was having recurrent episodes of SVT during the case that did not resolve with Valsalva but did resolve with carotid sinus massage.  She was diuresed and medications were optimized. At time of discharge, patient was started on Lasix 40 mg once daily.  Referral to electrophysiology was recommended given evidence of AVNRT during her  admission as well as cardiac event monitor placement at time of discharge.    Dr. Claros started patient on Eliquis 5 mg twice daily and uptitrated carvedilol to 9.375 mg twice daily after cardiac monitor revealed evidence of atrial fibrillation 11/13/2023. Ms. Ayala had a repeat echocardiogram 11/13/2023 that showed LVEF 35-40% with moderate global hypokinesia of the LV, normal RV size and function, and mild to moderate MR (1-2+).     At the time of her CORE enrollment/hospital follow up appointment 12/2023, she was started on Jardiance. Additionally, Ziopatch monitor was recommended to evaluate burden of atrial fibrillation as well as ventricular rates while in AFIB given ongoing episodic palpitations.    Zio patch monitor was worn 12/10/2023 to 12/17/2023 and revealed normal sinus rhythm with an average heart rate of 74 bpm.  There was no evidence of atrial fibrillation, but patient was noted to have frequent SVT with the longest episode lasting 7 minutes and 30 seconds at an average rate of 122 bpm.  Patient was also noted to have occasional PVCs (1.5%) and occasional PACs (1.6%).    BMP 1/7/2024 revealed normal renal function and electrolytes.    Patient is here today for CORE follow up.  Ms. Ayala has been feeling remarkably well since the time of her last appointment.  Denies shortness of breath, orthopnea and PND.  No chest pain, palpitations, lightheadedness, dizziness, near-syncope or syncope.    Taking all medications daily as prescribed.  Home weight has remained stable ~196-197#.  Continues to monitor the sodium in her diet very closely.    Works out with a  2 times per month.  In addition to this, she walks on the treadmill, uses the elliptical, and perform some light weight training.  Very rare alcohol consumption, maybe once yearly.  No illicit drug use.  No tobacco use.        Social History       Social History     Socioeconomic History    Marital status: Single     Spouse name: Not on  file    Number of children: Not on file    Years of education: Not on file    Highest education level: Not on file   Occupational History    Not on file   Tobacco Use    Smoking status: Former     Packs/day: 0.25     Years: 3.00     Additional pack years: 0.00     Total pack years: 0.75     Types: Cigarettes     Start date: 10/1/2019     Quit date: 10/1/2023     Years since quittin.2    Smokeless tobacco: Never   Vaping Use    Vaping Use: Never used   Substance and Sexual Activity    Alcohol use: Yes     Alcohol/week: 0.0 standard drinks of alcohol     Comment: rare use, 3-4 x per year    Drug use: No    Sexual activity: Yes     Partners: Male   Other Topics Concern    Parent/sibling w/ CABG, MI or angioplasty before 65F 55M? Not Asked   Social History Narrative    Not on file     Social Determinants of Health     Financial Resource Strain: Low Risk  (2023)    Financial Resource Strain     Within the past 12 months, have you or your family members you live with been unable to get utilities (heat, electricity) when it was really needed?: No   Food Insecurity: Low Risk  (2023)    Food Insecurity     Within the past 12 months, did you worry that your food would run out before you got money to buy more?: No     Within the past 12 months, did the food you bought just not last and you didn t have money to get more?: No   Transportation Needs: Low Risk  (2023)    Transportation Needs     Within the past 12 months, has lack of transportation kept you from medical appointments, getting your medicines, non-medical meetings or appointments, work, or from getting things that you need?: No   Physical Activity: Not on file   Stress: Not on file   Social Connections: Not on file   Interpersonal Safety: Low Risk  (2023)    Interpersonal Safety     Do you feel physically and emotionally safe where you currently live?: Yes     Within the past 12 months, have you been hit, slapped, kicked or otherwise  "physically hurt by someone?: No     Within the past 12 months, have you been humiliated or emotionally abused in other ways by your partner or ex-partner?: No   Housing Stability: Low Risk  (12/23/2023)    Housing Stability     Do you have housing? : Yes     Are you worried about losing your housing?: No   Recent Concern: Housing Stability - High Risk (10/18/2023)    Housing Stability     Do you have housing? : No     Are you worried about losing your housing?: No            Review of Systems:   Please see HPI         Physical Exam:   Vitals: /68 (BP Location: Right arm, Patient Position: Sitting, Cuff Size: Adult Large)   Pulse 56   Ht 1.702 m (5' 7\")   Wt 89.6 kg (197 lb 9.6 oz)   SpO2 96%   BMI 30.95 kg/m     Wt Readings from Last 4 Encounters:   12/26/23 88.8 kg (195 lb 12.8 oz)   12/05/23 90.5 kg (199 lb 8 oz)   10/19/23 92.1 kg (203 lb)   10/12/23 91.3 kg (201 lb 3.2 oz)     GEN: well nourished, in no acute distress.  HEENT:  Pupils equal, round. Sclerae nonicteric.   NECK: Supple, no masses appreciated. No JVD  C/V:  Regular rate and rhythm, no murmur, rub or gallop.    RESP: Respirations are unlabored. Clear to auscultation bilaterally without wheezing, rales, or rhonchi.  GI: Abdomen soft, nontender.  EXTREM: no LE edema.  NEURO: Alert and oriented, cooperative.  SKIN: Warm and dry.        Data:   LIPID RESULTS:  Lab Results   Component Value Date    CHOL 153 12/26/2023    CHOL 154 12/09/2020    HDL 42 (L) 12/26/2023    HDL 34 (L) 12/09/2020    LDL 81 12/26/2023    LDL 65 12/09/2020    TRIG 151 (H) 12/26/2023    TRIG 276 (H) 12/09/2020    CHOLHDLRATIO 7.0 (H) 06/16/2014     LIVER ENZYME RESULTS:  Lab Results   Component Value Date    AST 24 12/26/2023    AST 21 12/09/2020    ALT 28 12/26/2023    ALT 50 12/09/2020     CBC RESULTS:  Lab Results   Component Value Date    WBC 8.3 12/26/2023    WBC 8.6 12/09/2020    RBC 4.52 12/26/2023    RBC 4.32 12/09/2020    HGB 13.5 12/26/2023    HGB 13.2 " "12/09/2020    HCT 40.9 12/26/2023    HCT 39.4 12/09/2020    MCV 91 12/26/2023    MCV 91 12/09/2020    MCH 29.9 12/26/2023    MCH 30.6 12/09/2020    MCHC 33.0 12/26/2023    MCHC 33.5 12/09/2020    RDW 11.9 12/26/2023    RDW 12.6 12/09/2020     12/26/2023     12/09/2020     BMP RESULTS:  Lab Results   Component Value Date     01/07/2024     12/09/2020    POTASSIUM 4.1 01/07/2024    POTASSIUM 4.2 02/23/2022    POTASSIUM 3.9 12/09/2020    CHLORIDE 102 01/07/2024    CHLORIDE 107 02/23/2022    CHLORIDE 106 12/09/2020    CO2 26 01/07/2024    CO2 23 02/23/2022    CO2 27 12/09/2020    ANIONGAP 11 01/07/2024    ANIONGAP 9 02/23/2022    ANIONGAP 6 12/09/2020     (H) 01/07/2024     (H) 02/23/2022     (H) 12/09/2020    BUN 13.9 01/07/2024    BUN 13 02/23/2022    BUN 12 12/09/2020    CR 0.94 01/07/2024    CR 0.84 12/09/2020    GFRESTIMATED 67 01/07/2024    GFRESTIMATED 75 12/09/2020    GFRESTBLACK 87 12/09/2020    DIANNA 9.9 01/07/2024    DIANNA 9.6 12/09/2020      A1C RESULTS:  Lab Results   Component Value Date    A1C 5.4 12/26/2023    A1C 5.7 (H) 12/21/2018     INR RESULTS:  No results found for: \"INR\"         Medications     Current Outpatient Medications   Medication Sig Dispense Refill    apixaban ANTICOAGULANT (ELIQUIS ANTICOAGULANT) 5 MG tablet Take 1 tablet (5 mg) by mouth 2 times daily 60 tablet 2    budesonide (PULMICORT FLEXHALER) 180 MCG/ACT inhaler INHALE 2 PUFFS INTO THE LUNGS TWICE DAILY Due for appointment. Please schedule: [phone] 1 each 11    carvedilol (COREG) 6.25 MG tablet Take 1.5 tablets (9.375 mg) by mouth 2 times daily (with meals) 270 tablet 3    cetirizine (ZYRTEC) 10 MG tablet Take 10 mg by mouth every morning      cyclobenzaprine (FLEXERIL) 5 MG tablet Take 1 tablet (5 mg) by mouth 3 times daily as needed for muscle spasms 42 tablet 11    empagliflozin (JARDIANCE) 10 MG TABS tablet Take 1 tablet (10 mg) by mouth daily 90 tablet 3    furosemide (LASIX) 40 " MG tablet Take 1 tablet (40 mg) by mouth daily 90 tablet 0    guaiFENesin (MUCINEX) 600 MG 12 hr tablet Take 1,200 mg by mouth 2 times daily as needed for congestion      Levothyroxine Sodium (LEVOTHROID PO) Take 175 mcg by mouth every evening 1 tab Tues - Sun. 0.5 tabs on Monday's.  (Follow[facility]- Annual basis)      lisinopril (ZESTRIL) 2.5 MG tablet Take 1 tablet (2.5 mg) by mouth daily (Patient taking differently: Take 2.5 mg by mouth every morning) 90 tablet 0    melatonin 5 MG tablet Take 5 mg by mouth nightly as needed for sleep      rosuvastatin (CRESTOR) 5 MG tablet Take 1 tablet (5 mg) by mouth daily 90 tablet 0    spironolactone (ALDACTONE) 25 MG tablet Take 0.5 tablets (12.5 mg) by mouth daily (Patient taking differently: Take 12.5 mg by mouth every morning) 45 tablet 0    SUMAtriptan (IMITREX) 100 MG tablet TAKE 1 TABLET BY MOUTH AT ONSET OF HEADACHE AS DIRECTED 9 tablet 11    UNABLE TO FIND Nebulizer at home PRN      vitamin D3 (CHOLECALCIFEROL) 50 mcg (2000 units) tablet Take 1 tablet by mouth daily            Past Medical History     Past Medical History:   Diagnosis Date    Asthma     Cellulitis     s/p I&D of wound    Hypertension     Hypothyroidism     Lower extremity edema     Melanoma (H) 2015    S/P appendectomy     S/P arthroscopic knee surgery     S/P  section     S/P lateral meniscectomy of right knee     S/P JOSEP (total abdominal hysterectomy)     Seasonal allergies      Past Surgical History:   Procedure Laterality Date    APPENDECTOMY      COLONOSCOPY N/A 2016    Procedure: COLONOSCOPY;  Surgeon: Axel Rivera MD;  Location:  GI    CV CORONARY ANGIOGRAM N/A 10/11/2023    Procedure: Coronary Angiogram;  Surgeon: Reinaldo Woodward MD;  Location:  HEART CARDIAC CATH LAB    CV LEFT HEART CATH N/A 10/11/2023    Procedure: Left Heart Catheterization;  Surgeon: Reinaldo Woodward MD;  Location:  HEART CARDIAC CATH LAB    CV LEFT VENTRICULOGRAM N/A  10/11/2023    Procedure: Left Ventriculogram;  Surgeon: Reinaldo Woodward MD;  Location:  HEART CARDIAC CATH LAB    HYSTERECTOMY, PAP NO LONGER INDICATED       Family History   Problem Relation Age of Onset    Heart Failure Mother         later in life    Thyroid Disease Mother         graves disease    Arthritis Mother         Rheumatoid    Osteoporosis Mother     Rheumatoid Arthritis Mother     Hypertension Father     Heart Failure Father         later onset in life    Heart Surgery Father         stents palced x2    Mitral valve prolapse Father     Cerebrovascular Disease Maternal Grandmother          approx 72    Family History Negative Maternal Grandfather     Cerebrovascular Disease Paternal Grandmother          from    Alzheimer Disease Paternal Grandfather          from complications of    Hypertension Brother     Prostate Cancer Brother     Hypertension Sister     Hypothyroidism Sister     Family History Negative Son     Family History Negative Son     Obesity Son         Bariatric surgery 2023    Family History Negative Daughter     Colon Cancer No family hx of             Allergies   Atorvastatin, Montelukast sodium, and Pravastatin      30 minutes spent on the date of the encounter doing chart review, history and exam, documentation and further activities as noted above    Yenny Stringer PA-C  [facility]  Pager: [phone]

## 2024-01-09 NOTE — LETTER
1/9/2024    Fili Vasquez MD, MD  [address]  [place] [address]    RE: Rhoda Ayala       Dear Colleague,     I had the pleasure of seeing Rhoda Ayala in the [facility].  Cardiology Clinic Progress Note  Rhoda Ayala MRN# [id number]   YOB: 1960 Age: 63 year old   Primary Cardiologist: Dr. Claros Reason for visit: CORE follow up            Assessment and Plan:   Rhoda Ayala is a very pleasant 63 year old female who is here today for CORE follow up.      1.  HFrEF and nonischemic cardiomyopathy  - LVEF: 20-25% on echo 10/2023, improved to 35-40% on echo 11/2023  - Etiology: nonischemic  - Fluid status: euvolemic  - Diuretic regimen: Decrease Lasix to 20 mg once daily  - Ischemic evaluation: Cor angio 10/11/2023 revealed mild nonobstructive CAD  - Guideline directed medical therapy:  - Beta blocker: Increase carvedilol to 12.5 mg twice daily  - ACEI/ARB/ARNI: Lisinopril 2.5 mg once daily  - Aldactone antagonist: Spironolactone 12.5 mg once daily  - SGLT2 inhibitor: Jardiance 10 mg once daily  -Counseled patient on sodium restriction  2.  Paroxysmal atrial arrhythmias including atrial fibrillation and SVT.  On Eliquis 5 mg twice daily for cardioembolic risk reduction.  Recent 7-day Zio patch monitor did not reveal any recurrent atrial fibrillation but did reveal frequent SVT.  Will uptitrate beta-blocker. Has EP evaluation set up 2/7/2023  3.  Hypertension, BP well-controlled on current regimen  4.  Mild nonobstructive coronary artery disease by coronary angio 10/11/2023.  On statin  5.  Hypothyroidism, on levothyroxine      Changes today:   Decrease Lasix to 20 mg once daily  Increase carvedilol to 12.5 mg twice daily    Ms. Ayala has been feeling remarkably well since the time of her last appointment.  Tolerating all cardiac medications well without apparent side effects.  Denies any symptoms concerning for angina or heart failure.  Recent 7-day Zio patch  monitor revealed no evidence of recurrent atrial fibrillation but did show frequent SVT, with longest episode lasting 7 minutes and 30 seconds at an average rate of 122 bpm.  Average heart rate in NSR 74 bpm.  In an attempt to lessen SVT burden, recommend up titration of beta-blocker to 12.5 mg twice daily. Her blood pressure is borderline low in clinic today (asymptomatic - no episodes of lightheadedness or dizziness).  Given stable weights, will trial decreasing Lasix to 20 mg once daily to allow for more blood pressure room.  Ms. Ayala will continue daily weights and contact the clinic for weight gain of 3 pounds in 1 day or 5 pounds 1 week.  I have also asked her to start monitoring her blood pressure intermittently at home.    Has EP evaluation set up 2/7/2023.    Follow-up with Dr. Leobardo Figueredo 3/18/2024 with an echocardiogram prior (3/11/2024)    Yenny Stringer PA-C  [facility]  Pager: [phone]          History of Presenting Illness:    Rhoda Ayala is a very pleasant 63 year old female with a history of hypothyroidism and hypertension.      Patient presented to [facility] ED 10/10/2023 with exertional shortness of breath and chest discomfort.  ECG on admission showed NSR with nonspecific T wave changes. Labs notable for elevated NT proBNP at 3228, troponin mildly elevated at 29 and flat.  Normal renal function and electrolytes.  Blood counts normal with exception of mildly elevated WBC at 11.6.  Echocardiogram 10/10/2023 revealed severely decreased LV systolic function with LVEF 20 to 25%, moderately dilated LV with severe global hypokinesia of the LV.  RV was normal with respect to size and function.  LA moderately dilated.  2+ MR was also noted.  Coronary angiogram was completed 10/11/2023 that revealed mild nonobstructive coronary artery disease.  Patient was having recurrent episodes of SVT during the case that did not resolve with Valsalva but did resolve with carotid sinus  massage.  She was diuresed and medications were optimized. At time of discharge, patient was started on Lasix 40 mg once daily.  Referral to electrophysiology was recommended given evidence of AVNRT during her admission as well as cardiac event monitor placement at time of discharge.    Dr. Claros started patient on Eliquis 5 mg twice daily and uptitrated carvedilol to 9.375 mg twice daily after cardiac monitor revealed evidence of atrial fibrillation 11/13/2023. Ms. Ayala had a repeat echocardiogram 11/13/2023 that showed LVEF 35-40% with moderate global hypokinesia of the LV, normal RV size and function, and mild to moderate MR (1-2+).     At the time of her CORE enrollment/hospital follow up appointment 12/2023, she was started on Jardiance. Additionally, Ziopatch monitor was recommended to evaluate burden of atrial fibrillation as well as ventricular rates while in AFIB given ongoing episodic palpitations.    Zio patch monitor was worn 12/10/2023 to 12/17/2023 and revealed normal sinus rhythm with an average heart rate of 74 bpm.  There was no evidence of atrial fibrillation, but patient was noted to have frequent SVT with the longest episode lasting 7 minutes and 30 seconds at an average rate of 122 bpm.  Patient was also noted to have occasional PVCs (1.5%) and occasional PACs (1.6%).    BMP 1/7/2024 revealed normal renal function and electrolytes.    Patient is here today for CORE follow up.  Ms. Ayala has been feeling remarkably well since the time of her last appointment.  Denies shortness of breath, orthopnea and PND.  No chest pain, palpitations, lightheadedness, dizziness, near-syncope or syncope.    Taking all medications daily as prescribed.  Home weight has remained stable ~196-197#.  Continues to monitor the sodium in her diet very closely.    Works out with a  2 times per month.  In addition to this, she walks on the treadmill, uses the elliptical, and perform some light weight training.   Very rare alcohol consumption, maybe once yearly.  No illicit drug use.  No tobacco use.        Social History       Social History     Socioeconomic History    Marital status: Single     Spouse name: Not on file    Number of children: Not on file    Years of education: Not on file    Highest education level: Not on file   Occupational History    Not on file   Tobacco Use    Smoking status: Former     Packs/day: 0.25     Years: 3.00     Additional pack years: 0.00     Total pack years: 0.75     Types: Cigarettes     Start date: 10/1/2019     Quit date: 10/1/2023     Years since quittin.2    Smokeless tobacco: Never   Vaping Use    Vaping Use: Never used   Substance and Sexual Activity    Alcohol use: Yes     Alcohol/week: 0.0 standard drinks of alcohol     Comment: rare use, 3-4 x per year    Drug use: No    Sexual activity: Yes     Partners: Male   Other Topics Concern    Parent/sibling w/ CABG, MI or angioplasty before 65F 55M? Not Asked   Social History Narrative    Not on file     Social Determinants of Health     Financial Resource Strain: Low Risk  (2023)    Financial Resource Strain     Within the past 12 months, have you or your family members you live with been unable to get utilities (heat, electricity) when it was really needed?: No   Food Insecurity: Low Risk  (2023)    Food Insecurity     Within the past 12 months, did you worry that your food would run out before you got money to buy more?: No     Within the past 12 months, did the food you bought just not last and you didn t have money to get more?: No   Transportation Needs: Low Risk  (2023)    Transportation Needs     Within the past 12 months, has lack of transportation kept you from medical appointments, getting your medicines, non-medical meetings or appointments, work, or from getting things that you need?: No   Physical Activity: Not on file   Stress: Not on file   Social Connections: Not on file   Interpersonal Safety:  "Low Risk  (12/26/2023)    Interpersonal Safety     Do you feel physically and emotionally safe where you currently live?: Yes     Within the past 12 months, have you been hit, slapped, kicked or otherwise physically hurt by someone?: No     Within the past 12 months, have you been humiliated or emotionally abused in other ways by your partner or ex-partner?: No   Housing Stability: Low Risk  (12/23/2023)    Housing Stability     Do you have housing? : Yes     Are you worried about losing your housing?: No   Recent Concern: Housing Stability - High Risk (10/18/2023)    Housing Stability     Do you have housing? : No     Are you worried about losing your housing?: No            Review of Systems:   Please see HPI         Physical Exam:   Vitals: /68 (BP Location: Right arm, Patient Position: Sitting, Cuff Size: Adult Large)   Pulse 56   Ht 1.702 m (5' 7\")   Wt 89.6 kg (197 lb 9.6 oz)   SpO2 96%   BMI 30.95 kg/m     Wt Readings from Last 4 Encounters:   12/26/23 88.8 kg (195 lb 12.8 oz)   12/05/23 90.5 kg (199 lb 8 oz)   10/19/23 92.1 kg (203 lb)   10/12/23 91.3 kg (201 lb 3.2 oz)     GEN: well nourished, in no acute distress.  HEENT:  Pupils equal, round. Sclerae nonicteric.   NECK: Supple, no masses appreciated. No JVD  C/V:  Regular rate and rhythm, no murmur, rub or gallop.    RESP: Respirations are unlabored. Clear to auscultation bilaterally without wheezing, rales, or rhonchi.  GI: Abdomen soft, nontender.  EXTREM: no LE edema.  NEURO: Alert and oriented, cooperative.  SKIN: Warm and dry.        Data:   LIPID RESULTS:  Lab Results   Component Value Date    CHOL 153 12/26/2023    CHOL 154 12/09/2020    HDL 42 (L) 12/26/2023    HDL 34 (L) 12/09/2020    LDL 81 12/26/2023    LDL 65 12/09/2020    TRIG 151 (H) 12/26/2023    TRIG 276 (H) 12/09/2020    CHOLHDLRATIO 7.0 (H) 06/16/2014     LIVER ENZYME RESULTS:  Lab Results   Component Value Date    AST 24 12/26/2023    AST 21 12/09/2020    ALT 28 12/26/2023    " "ALT 50 12/09/2020     CBC RESULTS:  Lab Results   Component Value Date    WBC 8.3 12/26/2023    WBC 8.6 12/09/2020    RBC 4.52 12/26/2023    RBC 4.32 12/09/2020    HGB 13.5 12/26/2023    HGB 13.2 12/09/2020    HCT 40.9 12/26/2023    HCT 39.4 12/09/2020    MCV 91 12/26/2023    MCV 91 12/09/2020    MCH 29.9 12/26/2023    MCH 30.6 12/09/2020    MCHC 33.0 12/26/2023    MCHC 33.5 12/09/2020    RDW 11.9 12/26/2023    RDW 12.6 12/09/2020     12/26/2023     12/09/2020     BMP RESULTS:  Lab Results   Component Value Date     01/07/2024     12/09/2020    POTASSIUM 4.1 01/07/2024    POTASSIUM 4.2 02/23/2022    POTASSIUM 3.9 12/09/2020    CHLORIDE 102 01/07/2024    CHLORIDE 107 02/23/2022    CHLORIDE 106 12/09/2020    CO2 26 01/07/2024    CO2 23 02/23/2022    CO2 27 12/09/2020    ANIONGAP 11 01/07/2024    ANIONGAP 9 02/23/2022    ANIONGAP 6 12/09/2020     (H) 01/07/2024     (H) 02/23/2022     (H) 12/09/2020    BUN 13.9 01/07/2024    BUN 13 02/23/2022    BUN 12 12/09/2020    CR 0.94 01/07/2024    CR 0.84 12/09/2020    GFRESTIMATED 67 01/07/2024    GFRESTIMATED 75 12/09/2020    GFRESTBLACK 87 12/09/2020    DIANNA 9.9 01/07/2024    DIANNA 9.6 12/09/2020      A1C RESULTS:  Lab Results   Component Value Date    A1C 5.4 12/26/2023    A1C 5.7 (H) 12/21/2018     INR RESULTS:  No results found for: \"INR\"         Medications     Current Outpatient Medications   Medication Sig Dispense Refill    apixaban ANTICOAGULANT (ELIQUIS ANTICOAGULANT) 5 MG tablet Take 1 tablet (5 mg) by mouth 2 times daily 60 tablet 2    budesonide (PULMICORT FLEXHALER) 180 MCG/ACT inhaler INHALE 2 PUFFS INTO THE LUNGS TWICE DAILY Due for appointment. Please schedule: [phone] 1 each 11    carvedilol (COREG) 6.25 MG tablet Take 1.5 tablets (9.375 mg) by mouth 2 times daily (with meals) 270 tablet 3    cetirizine (ZYRTEC) 10 MG tablet Take 10 mg by mouth every morning      cyclobenzaprine (FLEXERIL) 5 MG tablet Take 1 " tablet (5 mg) by mouth 3 times daily as needed for muscle spasms 42 tablet 11    empagliflozin (JARDIANCE) 10 MG TABS tablet Take 1 tablet (10 mg) by mouth daily 90 tablet 3    furosemide (LASIX) 40 MG tablet Take 1 tablet (40 mg) by mouth daily 90 tablet 0    guaiFENesin (MUCINEX) 600 MG 12 hr tablet Take 1,200 mg by mouth 2 times daily as needed for congestion      Levothyroxine Sodium (LEVOTHROID PO) Take 175 mcg by mouth every evening 1 tab Tu - Sun. 0.5 tabs on Monday's.  (Follows with [facility]- Annual basis)      lisinopril (ZESTRIL) 2.5 MG tablet Take 1 tablet (2.5 mg) by mouth daily (Patient taking differently: Take 2.5 mg by mouth every morning) 90 tablet 0    melatonin 5 MG tablet Take 5 mg by mouth nightly as needed for sleep      rosuvastatin (CRESTOR) 5 MG tablet Take 1 tablet (5 mg) by mouth daily 90 tablet 0    spironolactone (ALDACTONE) 25 MG tablet Take 0.5 tablets (12.5 mg) by mouth daily (Patient taking differently: Take 12.5 mg by mouth every morning) 45 tablet 0    SUMAtriptan (IMITREX) 100 MG tablet TAKE 1 TABLET BY MOUTH AT ONSET OF HEADACHE AS DIRECTED 9 tablet 11    UNABLE TO FIND Nebulizer at home PRN      vitamin D3 (CHOLECALCIFEROL) 50 mcg (2000 units) tablet Take 1 tablet by mouth daily            Past Medical History     Past Medical History:   Diagnosis Date    Asthma     Cellulitis     s/p I&D of wound    Hypertension     Hypothyroidism     Lower extremity edema     Melanoma (H) 2015    S/P appendectomy     S/P arthroscopic knee surgery     S/P  section     S/P lateral meniscectomy of right knee     S/P JOSEP (total abdominal hysterectomy)     Seasonal allergies      Past Surgical History:   Procedure Laterality Date    APPENDECTOMY      COLONOSCOPY N/A 2016    Procedure: COLONOSCOPY;  Surgeon: Axel Rivera MD;  Location:  GI    CV CORONARY ANGIOGRAM N/A 10/11/2023    Procedure: Coronary Angiogram;  Surgeon: Reinaldo Woodward MD;  Location:  HEART  CARDIAC CATH LAB    CV LEFT HEART CATH N/A 10/11/2023    Procedure: Left Heart Catheterization;  Surgeon: Reinaldo Woodward MD;  Location: [facility] CARDIAC CATH LAB    CV LEFT VENTRICULOGRAM N/A 10/11/2023    Procedure: Left Ventriculogram;  Surgeon: Reinaldo Woodward MD;  Location: [facility] HEART CARDIAC CATH LAB    HYSTERECTOMY, PAP NO LONGER INDICATED       Family History   Problem Relation Age of Onset    Heart Failure Mother         later in life    Thyroid Disease Mother         graves disease    Arthritis Mother         Rheumatoid    Osteoporosis Mother     Rheumatoid Arthritis Mother     Hypertension Father     Heart Failure Father         later onset in life    Heart Surgery Father         stents palced x2    Mitral valve prolapse Father     Cerebrovascular Disease Maternal Grandmother          approx 72    Family History Negative Maternal Grandfather     Cerebrovascular Disease Paternal Grandmother          from    Alzheimer Disease Paternal Grandfather          from complications of    Hypertension Brother     Prostate Cancer Brother     Hypertension Sister     Hypothyroidism Sister     Family History Negative Son     Family History Negative Son     Obesity Son         Bariatric surgery 2023    Family History Negative Daughter     Colon Cancer No family hx of             Allergies   Atorvastatin, Montelukast sodium, and Pravastatin      30 minutes spent on the date of the encounter doing chart review, history and exam, documentation and further activities as noted above    CARMELA Stanley [facility]  Pager: [phone]      Thank you for allowing me to participate in the care of your patient.      Sincerely,     CARMELA Stanley [facility]  cc:   Yenny Stringer PA-C  [address]  [place] [address]

## 2024-01-09 NOTE — PATIENT INSTRUCTIONS
Call CORE nurse for any questions or concerns Mon-Fri 8am-4pm:                                                 #[phone]                                       For concerns after hours:                                               #[phone]      Medication changes:     Decrease Lasix to 20 mg once daily.     Increase carvedilol to 12.5 mg twice daily.     Plan from today:    Continue daily weights. If you notice a weight gain of 3 pounds in one day or 5 pounds in 1 week, please call me!    Follow up with Dr. Moore as scheduled 2/7/2024.    Follow up with Dr. Leobardo Figueredo 3/18/2024 with an echocardiogram prior (3/11/2024)

## 2024-01-17 DIAGNOSIS — I50.21 ACUTE SYSTOLIC CONGESTIVE HEART FAILURE (H): ICD-10-CM

## 2024-01-17 DIAGNOSIS — I48.92 ATRIAL FLUTTER (H): ICD-10-CM

## 2024-01-25 ENCOUNTER — TELEPHONE (OUTPATIENT)
Dept: CARDIOLOGY | Facility: CLINIC | Age: 64
End: 2024-01-25
Payer: COMMERCIAL

## 2024-01-25 NOTE — TELEPHONE ENCOUNTER
[facility]- [facility] Clinic    Received VM from patient stating she has developed a productive cough since OV with Yenny Stringer PA-C on 1/9/24. She noted medication changes were made at that OV and pt wonders if this is correlated.    Chart Review:   1/9/24: Decrease Lasix to 20 mg once daily. Increase carvedilol to 12.5 mg twice daily. Weight stable at 196-197 at home. Of note, pt is on Lisinopril 2.5mg/ daily.    Notes: Called and spoke to Rhoda. She notes that she developed what she though was a could last Friday that started as a runny nose. It has since progressed to a cough/ congestion in her lungs. She denies any other sx of illness such as body aches or fever. She denies SOB excluding when she is having a coughing spell. She denies any new or worsening swelling or abdominal bloating. She states weight is down further from OV, running at about 194-195 lbs at home.    Plan: Update sent to Yenny Stringer PA-C.    Eryn Jade, RN, BSN  [facility]/ [place]  C.O.R.E. Clinic Care Coordinator  January 25, 2024 9:04 AM    Future Appointments   Date Time Provider Department Center   2/7/2024  9:15 AM Sheldon Moore MD [facility] PSA CLIN   3/11/2024  7:30 AM [id number] [facility] RS   3/18/2024  1:15 PM Parker Claros MD [facility] PSA CLIN   12/31/2024  8:00 AM Fili Vasquez MD [facility] CS

## 2024-01-25 NOTE — TELEPHONE ENCOUNTER
[facility]- C.[facility] Clinic    Called Rhoda and reviewed Yenny Stringer PA-C message with pt.    Sounds like URI. If ongoing sx, see PCP for evaluation and management.    I do not think her symptoms are related to the decrease in Lasix given stable/decreasing weight.    Thanks,  Yenny Stringer PA-C on 1/25/2024 at 10:05 AM     Rhoda expresses understanding with no further questions at this time. Encouraged to call CORE line back for any new sx of CHF exacerbation such as SOB, swelling, or weight gain.    Eryn Jade, RN, BSN  [facility]/ [facility]  C.O.R.E. Clinic Care Coordinator  January 25, 2024 10:16 AM    Future Appointments   Date Time Provider Department Center   2/7/2024  9:15 AM Shledon Moore MD [facility] PSA CLIN   3/11/2024  7:30 AM [id number] [facility] RSCC   3/18/2024  1:15 PM Parker Claros MD [facility] PSA CLIN   12/31/2024  8:00 AM [facility]

## 2024-01-26 ENCOUNTER — TELEPHONE (OUTPATIENT)
Dept: CARDIOLOGY | Facility: CLINIC | Age: 64
End: 2024-01-26
Payer: COMMERCIAL

## 2024-01-26 DIAGNOSIS — I50.21 ACUTE SYSTOLIC CONGESTIVE HEART FAILURE (H): ICD-10-CM

## 2024-01-26 RX ORDER — SPIRONOLACTONE 25 MG/1
12.5 TABLET ORAL DAILY
Qty: 45 TABLET | Refills: 4 | Status: SHIPPED | OUTPATIENT
Start: 2024-01-26

## 2024-01-26 RX ORDER — CARVEDILOL 6.25 MG/1
12.5 TABLET ORAL 2 TIMES DAILY WITH MEALS
Qty: 360 TABLET | Refills: 4 | Status: SHIPPED | OUTPATIENT
Start: 2024-01-26

## 2024-01-26 RX ORDER — FUROSEMIDE 40 MG
20 TABLET ORAL DAILY
Qty: 45 TABLET | Refills: 0 | Status: SHIPPED | OUTPATIENT
Start: 2024-01-26 | End: 2024-04-16

## 2024-01-26 RX ORDER — LISINOPRIL 2.5 MG/1
2.5 TABLET ORAL DAILY
Qty: 90 TABLET | Refills: 4 | Status: SHIPPED | OUTPATIENT
Start: 2024-01-26 | End: 2024-03-18

## 2024-01-26 RX ORDER — ROSUVASTATIN CALCIUM 5 MG/1
5 TABLET, COATED ORAL DAILY
Qty: 90 TABLET | Refills: 4 | Status: SHIPPED | OUTPATIENT
Start: 2024-01-26 | End: 2024-03-18

## 2024-01-26 NOTE — TELEPHONE ENCOUNTER
Furosemide refill was sent earlier today to requested pharmacy.  Eliquis refills sent 1/17/24 to pharmacy requested, so should have refills on file and available.  Jardiance refills sent 12/5/23 to pharmacy requested, so should have refills on file and available.    Refills sent as requested for carvedilol, lisinopril, spironolactone, rosuvastatin.

## 2024-01-26 NOTE — TELEPHONE ENCOUNTER
[facility] Call Center    Phone Message    May a detailed message be left on voicemail: yes     Reason for Call: Medication Refill Request    Has the patient contacted the pharmacy for the refill? Yes   Name of medication being requested:   carvedilol (COREG) 6.25 MG tablet  furosemide (LASIX) 40 MG tablet  lisinopril (ZESTRIL) 2.5 MG tablet  spironolactone (ALDACTONE) 25 MG tablet  rosuvastatin (CRESTOR) 5 MG tablet  apixaban ANTICOAGULANT (ELIQUIS ANTICOAGULANT) 5 MG tablet  empagliflozin (JARDIANCE) 10 MG TABS tablet  Provider who prescribed the medication:   Siomara Claros  Pharmacy: [id number] [id number] AT [place] [address]   Date medication is needed: 1/26/24    Action Taken: Message routed to:  Other: Cardiology    Travel Screening: Not Applicable    Thank you!  [facility]

## 2024-01-29 ENCOUNTER — TELEPHONE (OUTPATIENT)
Dept: CARDIOLOGY | Facility: CLINIC | Age: 64
End: 2024-01-29
Payer: COMMERCIAL

## 2024-01-29 NOTE — TELEPHONE ENCOUNTER
----- Message from Yenny Stringer PA-C sent at 1/29/2024  8:40 AM CST -----  Regarding: patient is requesting carvedilol refill  Hi,    I got an email from this patient requesting a refill of her carvedilol. Can we please provide her with this?    Thanks,  Yenny Stringer PA-C on 1/29/2024 at 8:40 AM

## 2024-01-29 NOTE — TELEPHONE ENCOUNTER
[facility]    Reviewed chart and refill already sent for Carvedilol on 1/26/24.  Closing encounter    Armida Paz RN BSN  [facility] Care Coordinator  [facility] [place], [place]  [phone]  01/29/24, 9:23 AM

## 2024-02-06 NOTE — PROGRESS NOTES
"PHYSICIAN NOTE:  This visit was completed in person at the [facility].      I had the pleasure of seeing Ms. Rhoda Ayala for evaluation of atrial arrhythmias. She has been referred by Yenny Stringer PA-C and Dr. Parker Claros.     The patient is a very pleasant 63-year-old female with recently diagnosed nonischemic cardiomyopathy, EF 20-25%, HFrEF, asthma, melanoma, tobacco dependence, hypertension, hypothyroidism and paroxysmal AF and SVT.     Ms. Ayala presented with shortness of breath and was hospitalized in October 2023. Diagnosed with acute CHF and new cardiomyopathy. Cardiac catheterization showed mild CAD. She exhibited SVT runs, including during cardiac catheterization. There was concern regarding tachycardia -induced cardiomyopathy.  She was discharged on aspirin, carvedilol, furosemide, lisinopril, spironolactone and rosuvastatin.     The patient was discharged with a 30-day cardiac event monitor which showed a single (at least) 11 minute AF episode, exact duration unknown. She was placed on apixaban.    She later completed a 7-day leadless cardiac monitor showing 6118 SVT runs, longest 7.5 minutes, average rate 122 bpm.    Today she said that she feels like a \"new person\". Her stamina and energy level are almost back to normal. She has no chest pain, orthopnea or PND, unusual AMES. She states that during hospital admission she would feel \"light heart fluttering\" but has not had the sensation in the past month.    Still smokes on occasion. Minimal use of alcohol. She lives alone. 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      PHYSICAL EXAMINATION:  Vital signs: 138/80, 53, 80.9 kg, BMI 30.7  General: she is an extremely pleasant woman, in no distress  ENT/Mouth:  no nasal discharge.  Eyes:  normal conjunctivae.   Neck:  no thyromegaly or lymphadenopathy.  Chest/Lungs:  patient is not dyspneic.  Lungs CTA, without rales or wheezing  Cardiovascular: normal " JVP, rhythm is regular.  No gallop, murmur or rub.    Abdomen:  no abdominal distention.  Soft, negative HJR.    Extremities:  no edema  Skin:  no xanthelasma.    Neurologic:  alert & oriented x 3.  No tremor.    Vascular:  2+ carotids without bruits.  2+ radials.        DIAGNOSTIC STUDIES:  Laboratory studies: sodium 139, potassium 4.1, creatinine 0.94, calcium 9.9, free T4 elevated at 2.02, TSH suppressed at 0.13  ECG: sinus rhythm, ST-T abnormality, no ventricular preexcitation.  Echocardiogram: Limited study in 11/2023 showed EF 35 - 40% with moderate global hypokinesis, 1-2+ MR  30-day cardiac event monitor (October - November 2023): see HPI  7-day cardiac monitor (12/2023): over 6000 SVT runs, longest of 7.5 minutes at rate of 122, no clear AF      IMPRESSION:  Nonischemic cardiomyopathy. Admission with CHF in 10/2023. She is feeling better on medical therapy. EF improved 35-40% within a month after admission. Very likely it has improved further.  Atrial arrhythmias. An episode of AF was detected on her initial 30-day cardiac monitor. Duration unclear, it lasted at least 11 minutes. On a subsequent 7-day leadless monitor she had numerous brief episodes of SVT, longest 7 minutes. Given this low burden of arrhythmia it is unlikely she has tachycardia-induced cardiomyopathy. Symptomatically, she feels much improved. Given the low arrhythmia burden and asymptomatic status, further intervention with AA drug or catheter ablation is not warranted at the moment.    RECOMMENDATIONS:  Repeat a 14-day cardiac monitor in 6 months.  Continue current medications.  Follow-up with EP DARYL after completion of the cardiac monitor.    It was my pleasure seeing this delightful patient.  Please feel free to call with any questions.     Sheldon Moore MD, [facility]      (Chart documentation was completed, in part, using Dragon voice-recognition software. The note was reviewed, however grammatical and spelling errors may be  present.)      CURRENT MEDICATIONS:  Current Outpatient Medications   Medication Sig Dispense Refill    apixaban ANTICOAGULANT (ELIQUIS ANTICOAGULANT) 5 MG tablet Take 1 tablet (5 mg) by mouth 2 times daily 180 tablet 4    budesonide (PULMICORT FLEXHALER) 180 MCG/ACT inhaler INHALE 2 PUFFS INTO THE LUNGS TWICE DAILY Due for appointment. Please schedule: [phone] 1 each 11    carvedilol (COREG) 6.25 MG tablet Take 2 tablets (12.5 mg) by mouth 2 times daily (with meals) 360 tablet 4    cetirizine (ZYRTEC) 10 MG tablet Take 10 mg by mouth every morning      cyclobenzaprine (FLEXERIL) 5 MG tablet Take 1 tablet (5 mg) by mouth 3 times daily as needed for muscle spasms 42 tablet 11    empagliflozin (JARDIANCE) 10 MG TABS tablet Take 1 tablet (10 mg) by mouth daily 90 tablet 3    furosemide (LASIX) 40 MG tablet Take 0.5 tablets (20 mg) by mouth daily 45 tablet 0    guaiFENesin (MUCINEX) 600 MG 12 hr tablet Take 1,200 mg by mouth 2 times daily as needed for congestion      Levothyroxine Sodium (LEVOTHROID PO) Take 175 mcg by mouth every evening 1 tab Tues - Sun. 0.5 tabs on Monday's.  (Follows with [facility] of [facility]- Annual basis)      lisinopril (ZESTRIL) 2.5 MG tablet Take 1 tablet (2.5 mg) by mouth daily 90 tablet 4    melatonin 5 MG tablet Take 5 mg by mouth nightly as needed for sleep      rosuvastatin (CRESTOR) 5 MG tablet Take 1 tablet (5 mg) by mouth daily 90 tablet 4    spironolactone (ALDACTONE) 25 MG tablet Take 0.5 tablets (12.5 mg) by mouth daily 45 tablet 4    SUMAtriptan (IMITREX) 100 MG tablet TAKE 1 TABLET BY MOUTH AT ONSET OF HEADACHE AS DIRECTED 9 tablet 11    UNABLE TO FIND Nebulizer at home PRN      vitamin D3 (CHOLECALCIFEROL) 50 mcg (2000 units) tablet Take 1 tablet by mouth daily         ALLERGIES     Allergies   Allergen Reactions    Atorvastatin Muscle Pain (Myalgia)    Montelukast Sodium Unknown    Pravastatin      Edema, cramping       PAST MEDICAL HISTORY:  Past Medical History:   Diagnosis  Date    Asthma     Cellulitis     s/p I&D of wound    Hypertension     Hypothyroidism     Lower extremity edema     Melanoma (H) 2015    S/P appendectomy     S/P arthroscopic knee surgery     S/P  section     S/P lateral meniscectomy of right knee     S/P JOSEP (total abdominal hysterectomy)     Seasonal allergies        PAST SURGICAL HISTORY:  Past Surgical History:   Procedure Laterality Date    APPENDECTOMY      COLONOSCOPY N/A 2016    Procedure: COLONOSCOPY;  Surgeon: Axel Rivera MD;  Location: [facility]    CV CORONARY ANGIOGRAM N/A 10/11/2023    Procedure: Coronary Angiogram;  Surgeon: Reinaldo Woodward MD;  Location: [facility]    CV LEFT HEART CATH N/A 10/11/2023    Procedure: Left Heart Catheterization;  Surgeon: Reinaldo Woodward MD;  Location: [facility]    CV LEFT VENTRICULOGRAM N/A 10/11/2023    Procedure: Left Ventriculogram;  Surgeon: Reinaldo Woodward MD;  Location: [facility]    HYSTERECTOMY, PAP NO LONGER INDICATED         FAMILY HISTORY:  Family History   Problem Relation Age of Onset    Heart Failure Mother         later in life    Thyroid Disease Mother         graves disease    Arthritis Mother         Rheumatoid    Osteoporosis Mother     Rheumatoid Arthritis Mother     Hypertension Father     Heart Failure Father         later onset in life    Heart Surgery Father         stents palced x2    Mitral valve prolapse Father     Cerebrovascular Disease Maternal Grandmother          approx 72    Family History Negative Maternal Grandfather     Cerebrovascular Disease Paternal Grandmother          from    Alzheimer Disease Paternal Grandfather          from complications of    Hypertension Brother     Prostate Cancer Brother     Hypertension Sister     Hypothyroidism Sister     Family History Negative Son     Family History Negative Son     Obesity Son         Bariatric surgery 2023    Family History Negative Daughter      Colon Cancer No family hx of        SOCIAL HISTORY:  Social History     Socioeconomic History    Marital status: Single   Tobacco Use    Smoking status: Former     Packs/day: 0.25     Years: 3.00     Additional pack years: 0.00     Total pack years: 0.75     Types: Cigarettes     Start date: 10/1/2019     Quit date: 10/1/2023     Years since quittin.3    Smokeless tobacco: Never   Vaping Use    Vaping Use: Never used   Substance and Sexual Activity    Alcohol use: Yes     Alcohol/week: 0.0 standard drinks of alcohol     Comment: rare use, 3-4 x per year    Drug use: No    Sexual activity: Yes     Partners: Male     Social Determinants of Health     Financial Resource Strain: Low Risk  (2023)    Financial Resource Strain     Within the past 12 months, have you or your family members you live with been unable to get utilities (heat, electricity) when it was really needed?: No   Food Insecurity: Low Risk  (2023)    Food Insecurity     Within the past 12 months, did you worry that your food would run out before you got money to buy more?: No     Within the past 12 months, did the food you bought just not last and you didn t have money to get more?: No   Transportation Needs: Low Risk  (2023)    Transportation Needs     Within the past 12 months, has lack of transportation kept you from medical appointments, getting your medicines, non-medical meetings or appointments, work, or from getting things that you need?: No   Interpersonal Safety: Low Risk  (2023)    Interpersonal Safety     Do you feel physically and emotionally safe where you currently live?: Yes     Within the past 12 months, have you been hit, slapped, kicked or otherwise physically hurt by someone?: No     Within the past 12 months, have you been humiliated or emotionally abused in other ways by your partner or ex-partner?: No   Housing Stability: Low Risk  (2023)    Housing Stability     Do you have housing? : Yes     Are  you worried about losing your housing?: No   Recent Concern: Housing Stability - High Risk (10/18/2023)    Housing Stability     Do you have housing? : No     Are you worried about losing your housing?: No       Review of Systems:  Skin:          Eyes:         ENT:         Respiratory:          Cardiovascular:         Gastroenterology:        Genitourinary:         Musculoskeletal:         Neurologic:         Psychiatric:         Heme/Lymph/Imm:         Endocrine:           Physical Exam:  Vitals: There were no vitals taken for this visit.    Constitutional:           Skin:             Head:           Eyes:           Lymph:      ENT:           Neck:           Respiratory:            Cardiac:                                                           GI:           Extremities and Muscular Skeletal:                 Neurological:           Psych:           CC  Parker Claros MD  [address]  [place] [address]

## 2024-02-07 ENCOUNTER — OFFICE VISIT (OUTPATIENT)
Dept: CARDIOLOGY | Facility: CLINIC | Age: 64
End: 2024-02-07
Payer: COMMERCIAL

## 2024-02-07 VITALS
WEIGHT: 196 LBS | HEIGHT: 67 IN | DIASTOLIC BLOOD PRESSURE: 80 MMHG | HEART RATE: 53 BPM | BODY MASS INDEX: 30.76 KG/M2 | SYSTOLIC BLOOD PRESSURE: 138 MMHG

## 2024-02-07 DIAGNOSIS — I48.0 PAF (PAROXYSMAL ATRIAL FIBRILLATION) (H): Primary | ICD-10-CM

## 2024-02-07 DIAGNOSIS — I42.9 CARDIOMYOPATHY, UNSPECIFIED TYPE (H): ICD-10-CM

## 2024-02-07 DIAGNOSIS — I47.10 SVT (SUPRAVENTRICULAR TACHYCARDIA) (H): ICD-10-CM

## 2024-02-07 PROCEDURE — 93000 ELECTROCARDIOGRAM COMPLETE: CPT | Performed by: INTERNAL MEDICINE

## 2024-02-07 PROCEDURE — 99204 OFFICE O/P NEW MOD 45 MIN: CPT | Performed by: INTERNAL MEDICINE

## 2024-02-07 NOTE — LETTER
"2/7/2024    Fili Vasquez MD, MD  [address]  [place] [address]    RE: Rhoda Ayala       Dear Colleague,     I had the pleasure of seeing Rhoda Ayala in the [facility].  PHYSICIAN NOTE:  This visit was completed in person at the [facility].      I had the pleasure of seeing Ms. Rhoda Ayala for evaluation of atrial arrhythmias. She has been referred by Yenny Stringer PA-C and Dr. Parker Claros.     The patient is a very pleasant 63-year-old female with recently diagnosed nonischemic cardiomyopathy, EF 20-25%, HFrEF, asthma, melanoma, tobacco dependence, hypertension, hypothyroidism and paroxysmal AF and SVT.     Ms. Ayala presented with shortness of breath and was hospitalized in October 2023. Diagnosed with acute CHF and new cardiomyopathy. Cardiac catheterization showed mild CAD. She exhibited SVT runs, including during cardiac catheterization. There was concern regarding tachycardia -induced cardiomyopathy.  She was discharged on aspirin, carvedilol, furosemide, lisinopril, spironolactone and rosuvastatin.     The patient was discharged with a 30-day cardiac event monitor which showed a single (at least) 11 minute AF episode, exact duration unknown. She was placed on apixaban.    She later completed a 7-day leadless cardiac monitor showing 6118 SVT runs, longest 7.5 minutes, average rate 122 bpm.    Today she said that she feels like a \"new person\". Her stamina and energy level are almost back to normal. She has no chest pain, orthopnea or PND, unusual AMES. She states that during hospital admission she would feel \"light heart fluttering\" but has not had the sensation in the past month.    Still smokes on occasion. Minimal use of alcohol. She lives alone. 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      PHYSICAL EXAMINATION:  Vital signs: 138/80, 53, 80.9 kg, BMI 30.7  General: she is an extremely pleasant woman, in no " distress  ENT/Mouth:  no nasal discharge.  Eyes:  normal conjunctivae.   Neck:  no thyromegaly or lymphadenopathy.  Chest/Lungs:  patient is not dyspneic.  Lungs CTA, without rales or wheezing  Cardiovascular: normal JVP, rhythm is regular.  No gallop, murmur or rub.    Abdomen:  no abdominal distention.  Soft, negative HJR.    Extremities:  no edema  Skin:  no xanthelasma.    Neurologic:  alert & oriented x 3.  No tremor.    Vascular:  2+ carotids without bruits.  2+ radials.        DIAGNOSTIC STUDIES:  Laboratory studies: sodium 139, potassium 4.1, creatinine 0.94, calcium 9.9, free T4 elevated at 2.02, TSH suppressed at 0.13  ECG: sinus rhythm, ST-T abnormality, no ventricular preexcitation.  Echocardiogram: Limited study in 11/2023 showed EF 35 - 40% with moderate global hypokinesis, 1-2+ MR  30-day cardiac event monitor (October - November 2023): see HPI  7-day cardiac monitor (12/2023): over 6000 SVT runs, longest of 7.5 minutes at rate of 122, no clear AF      IMPRESSION:  Nonischemic cardiomyopathy. Admission with CHF in 10/2023. She is feeling better on medical therapy. EF improved 35-40% within a month after admission. Very likely it has improved further.  Atrial arrhythmias. An episode of AF was detected on her initial 30-day cardiac monitor. Duration unclear, it lasted at least 11 minutes. On a subsequent 7-day leadless monitor she had numerous brief episodes of SVT, longest 7 minutes. Given this low burden of arrhythmia it is unlikely she has tachycardia-induced cardiomyopathy. Symptomatically, she feels much improved. Given the low arrhythmia burden and asymptomatic status, further intervention with AA drug or catheter ablation is not warranted at the moment.    RECOMMENDATIONS:  Repeat a 14-day cardiac monitor in 6 months.  Continue current medications.  Follow-up with EP DARYL after completion of the cardiac monitor.    It was my pleasure seeing this delightful patient.  Please feel free to call with  any questions.     Sheldon Moore MD, [facility]      (Chart documentation was completed, in part, using Dragon voice-recognition software. The note was reviewed, however grammatical and spelling errors may be present.)      CURRENT MEDICATIONS:  Current Outpatient Medications   Medication Sig Dispense Refill    apixaban ANTICOAGULANT (ELIQUIS ANTICOAGULANT) 5 MG tablet Take 1 tablet (5 mg) by mouth 2 times daily 180 tablet 4    budesonide (PULMICORT FLEXHALER) 180 MCG/ACT inhaler INHALE 2 PUFFS INTO THE LUNGS TWICE DAILY Due for appointment. Please schedule: [phone] 1 each 11    carvedilol (COREG) 6.25 MG tablet Take 2 tablets (12.5 mg) by mouth 2 times daily (with meals) 360 tablet 4    cetirizine (ZYRTEC) 10 MG tablet Take 10 mg by mouth every morning      cyclobenzaprine (FLEXERIL) 5 MG tablet Take 1 tablet (5 mg) by mouth 3 times daily as needed for muscle spasms 42 tablet 11    empagliflozin (JARDIANCE) 10 MG TABS tablet Take 1 tablet (10 mg) by mouth daily 90 tablet 3    furosemide (LASIX) 40 MG tablet Take 0.5 tablets (20 mg) by mouth daily 45 tablet 0    guaiFENesin (MUCINEX) 600 MG 12 hr tablet Take 1,200 mg by mouth 2 times daily as needed for congestion      Levothyroxine Sodium (LEVOTHROID PO) Take 175 mcg by mouth every evening 1 tab Tues - Sun. 0.5 tabs on Monday's.  (Follows with [facility]- Annual basis)      lisinopril (ZESTRIL) 2.5 MG tablet Take 1 tablet (2.5 mg) by mouth daily 90 tablet 4    melatonin 5 MG tablet Take 5 mg by mouth nightly as needed for sleep      rosuvastatin (CRESTOR) 5 MG tablet Take 1 tablet (5 mg) by mouth daily 90 tablet 4    spironolactone (ALDACTONE) 25 MG tablet Take 0.5 tablets (12.5 mg) by mouth daily 45 tablet 4    SUMAtriptan (IMITREX) 100 MG tablet TAKE 1 TABLET BY MOUTH AT ONSET OF HEADACHE AS DIRECTED 9 tablet 11    UNABLE TO FIND Nebulizer at home PRN      vitamin D3 (CHOLECALCIFEROL) 50 mcg (2000 units) tablet Take 1 tablet by mouth daily          ALLERGIES     Allergies   Allergen Reactions    Atorvastatin Muscle Pain (Myalgia)    Montelukast Sodium Unknown    Pravastatin      Edema, cramping       PAST MEDICAL HISTORY:  Past Medical History:   Diagnosis Date    Asthma     Cellulitis     s/p I&D of wound    Hypertension     Hypothyroidism     Lower extremity edema     Melanoma (H) 2015    S/P appendectomy     S/P arthroscopic knee surgery     S/P  section     S/P lateral meniscectomy of right knee     S/P JOSEP (total abdominal hysterectomy)     Seasonal allergies        PAST SURGICAL HISTORY:  Past Surgical History:   Procedure Laterality Date    APPENDECTOMY      COLONOSCOPY N/A 2016    Procedure: COLONOSCOPY;  Surgeon: Axel Rivera MD;  Location:  GI    CV CORONARY ANGIOGRAM N/A 10/11/2023    Procedure: Coronary Angiogram;  Surgeon: Reinaldo Woodward MD;  Location:  HEART CARDIAC CATH LAB    CV LEFT HEART CATH N/A 10/11/2023    Procedure: Left Heart Catheterization;  Surgeon: Reinaldo Woodward MD;  Location:  HEART CARDIAC CATH LAB    CV LEFT VENTRICULOGRAM N/A 10/11/2023    Procedure: Left Ventriculogram;  Surgeon: Reinaldo Woodward MD;  Location: [facility]    HYSTERECTOMY, PAP NO LONGER INDICATED         FAMILY HISTORY:  Family History   Problem Relation Age of Onset    Heart Failure Mother         later in life    Thyroid Disease Mother         graves disease    Arthritis Mother         Rheumatoid    Osteoporosis Mother     Rheumatoid Arthritis Mother     Hypertension Father     Heart Failure Father         later onset in life    Heart Surgery Father         stents palced x2    Mitral valve prolapse Father     Cerebrovascular Disease Maternal Grandmother          approx 72    Family History Negative Maternal Grandfather     Cerebrovascular Disease Paternal Grandmother          from    Alzheimer Disease Paternal Grandfather          from complications of    Hypertension Brother     Prostate  Cancer Brother     Hypertension Sister     Hypothyroidism Sister     Family History Negative Son     Family History Negative Son     Obesity Son         Bariatric surgery 2023    Family History Negative Daughter     Colon Cancer No family hx of        SOCIAL HISTORY:  Social History     Socioeconomic History    Marital status: Single   Tobacco Use    Smoking status: Former     Packs/day: 0.25     Years: 3.00     Additional pack years: 0.00     Total pack years: 0.75     Types: Cigarettes     Start date: 10/1/2019     Quit date: 10/1/2023     Years since quittin.3    Smokeless tobacco: Never   Vaping Use    Vaping Use: Never used   Substance and Sexual Activity    Alcohol use: Yes     Alcohol/week: 0.0 standard drinks of alcohol     Comment: rare use, 3-4 x per year    Drug use: No    Sexual activity: Yes     Partners: Male     Social Determinants of Health     Financial Resource Strain: Low Risk  (2023)    Financial Resource Strain     Within the past 12 months, have you or your family members you live with been unable to get utilities (heat, electricity) when it was really needed?: No   Food Insecurity: Low Risk  (2023)    Food Insecurity     Within the past 12 months, did you worry that your food would run out before you got money to buy more?: No     Within the past 12 months, did the food you bought just not last and you didn t have money to get more?: No   Transportation Needs: Low Risk  (2023)    Transportation Needs     Within the past 12 months, has lack of transportation kept you from medical appointments, getting your medicines, non-medical meetings or appointments, work, or from getting things that you need?: No   Interpersonal Safety: Low Risk  (2023)    Interpersonal Safety     Do you feel physically and emotionally safe where you currently live?: Yes     Within the past 12 months, have you been hit, slapped, kicked or otherwise physically hurt by someone?: No     Within  the past 12 months, have you been humiliated or emotionally abused in other ways by your partner or ex-partner?: No   Housing Stability: Low Risk  (12/23/2023)    Housing Stability     Do you have housing? : Yes     Are you worried about losing your housing?: No   Recent Concern: Housing Stability - High Risk (10/18/2023)    Housing Stability     Do you have housing? : No     Are you worried about losing your housing?: No       Review of Systems:  Skin:          Eyes:         ENT:         Respiratory:          Cardiovascular:         Gastroenterology:        Genitourinary:         Musculoskeletal:         Neurologic:         Psychiatric:         Heme/Lymph/Imm:         Endocrine:           Physical Exam:  Vitals: There were no vitals taken for this visit.    Constitutional:           Skin:             Head:           Eyes:           Lymph:      ENT:           Neck:           Respiratory:            Cardiac:                                                           GI:           Extremities and Muscular Skeletal:                 Neurological:           Psych:           CC  Parker Claros MD  [address]  [place]  [place] [address]      Thank you for allowing me to participate in the care of your patient.      Sincerely,     Sheldon Moore MD     [facility]

## 2024-02-24 NOTE — TELEPHONE ENCOUNTER
Pt is scheduled for a [facility] appt on 12/28/23 with Alice.      Pt with a history of systolic heart failure..  Medication list reviewed and pt is not currently on Entresto, Jardiance, Farxiga, and Invokana.    Please initiate coverage check for the above medications.  Thank you!  JAILENE Camacho([facility]) 11/1/23   a/p HD#3 MIOL preeclampsia w/o severe features 37 weeks 5 days, stable  stop pitocin  start vaginal cytotec  npo  continuous monitoring  venodynes  monitor BP  plan by Dr. Tubbs at bedside, informed patient

## 2024-03-11 ENCOUNTER — HOSPITAL ENCOUNTER (OUTPATIENT)
Dept: CARDIOLOGY | Facility: CLINIC | Age: 64
Discharge: HOME | End: 2024-03-11
Attending: INTERNAL MEDICINE | Admitting: INTERNAL MEDICINE
Payer: COMMERCIAL

## 2024-03-11 DIAGNOSIS — I50.23 ACUTE ON CHRONIC SYSTOLIC HEART FAILURE (H): ICD-10-CM

## 2024-03-11 LAB — LVEF ECHO: NORMAL

## 2024-03-11 PROCEDURE — 999N000208 ECHOCARDIOGRAM COMPLETE

## 2024-03-11 PROCEDURE — 255N000002 HC RX 255 OP 636: Performed by: INTERNAL MEDICINE

## 2024-03-11 PROCEDURE — 93306 TTE W/DOPPLER COMPLETE: CPT | Mod: 26 | Performed by: INTERNAL MEDICINE

## 2024-03-11 RX ADMIN — HUMAN ALBUMIN MICROSPHERES AND PERFLUTREN 4 ML: 10; .22 INJECTION, SOLUTION INTRAVENOUS at 07:56

## 2024-03-18 ENCOUNTER — OFFICE VISIT (OUTPATIENT)
Dept: CARDIOLOGY | Facility: CLINIC | Age: 64
End: 2024-03-18
Payer: COMMERCIAL

## 2024-03-18 VITALS
BODY MASS INDEX: 30.84 KG/M2 | HEART RATE: 54 BPM | SYSTOLIC BLOOD PRESSURE: 124 MMHG | DIASTOLIC BLOOD PRESSURE: 78 MMHG | HEIGHT: 67 IN | OXYGEN SATURATION: 99 % | WEIGHT: 196.5 LBS

## 2024-03-18 DIAGNOSIS — I47.10 SVT (SUPRAVENTRICULAR TACHYCARDIA) (H): ICD-10-CM

## 2024-03-18 DIAGNOSIS — I48.0 PAF (PAROXYSMAL ATRIAL FIBRILLATION) (H): ICD-10-CM

## 2024-03-18 DIAGNOSIS — I42.9 CARDIOMYOPATHY, UNSPECIFIED TYPE (H): ICD-10-CM

## 2024-03-18 DIAGNOSIS — I50.23 ACUTE ON CHRONIC SYSTOLIC HEART FAILURE (H): Primary | ICD-10-CM

## 2024-03-18 DIAGNOSIS — I47.19 AVNRT (AV NODAL RE-ENTRY TACHYCARDIA) (H): ICD-10-CM

## 2024-03-18 DIAGNOSIS — I50.21 ACUTE SYSTOLIC CONGESTIVE HEART FAILURE (H): ICD-10-CM

## 2024-03-18 DIAGNOSIS — I25.10 CORONARY ARTERY DISEASE INVOLVING NATIVE CORONARY ARTERY OF NATIVE HEART WITHOUT ANGINA PECTORIS: ICD-10-CM

## 2024-03-18 DIAGNOSIS — I50.9 ACUTE CONGESTIVE HEART FAILURE, UNSPECIFIED HEART FAILURE TYPE (H): ICD-10-CM

## 2024-03-18 PROCEDURE — 99214 OFFICE O/P EST MOD 30 MIN: CPT | Performed by: INTERNAL MEDICINE

## 2024-03-18 RX ORDER — ROSUVASTATIN CALCIUM 20 MG/1
20 TABLET, COATED ORAL DAILY
Qty: 90 TABLET | Refills: 4 | Status: SHIPPED | OUTPATIENT
Start: 2024-03-18

## 2024-03-18 RX ORDER — LISINOPRIL 5 MG/1
5 TABLET ORAL DAILY
Qty: 90 TABLET | Refills: 4 | Status: SHIPPED | OUTPATIENT
Start: 2024-03-18 | End: 2024-08-12

## 2024-03-18 NOTE — LETTER
3/18/2024    Fili Vasquez MD, MD  [address]  [place] [address]    RE: Rhoda Ayala       Dear Colleague,     I had the pleasure of seeing Rhoda Ayala in the [facility].  HPI and Plan:   It is my pleasure to see your patient Rhoda Ayala who is a 63-year-old patient who I saw in October in the hospital when she was admitted with heart failure and found to have an ejection fraction of 25%.  Patient had mild nonobstructive coronary artery disease confirming the diagnosis of idiopathic nonischemic cardiomyopathy.  The patient was also found to have frequent episodes of SVT.  Subsequent event monitoring showed that the patient was having some episodes of atrial fibrillation also and was started on Eliquis.  The patient was seen last month by electrophysiology specifically Dr. Moore he felt the patient should continue on her present AV elizabeth blocking agents with carvedilol and that in 6 months time they would repeat the event monitoring and follow-up with the EP at that stage.    With that background in mind the patient has been doing well she feels a lot better.  Echocardiography shows a significant improvement in ejection fraction which was performed 7 days ago.  The EF which was previously 25% is now in the 40 to 45% range.  Her blood pressure is well-controlled as you can see below.  She is not complaining of orthopnea PND or ankle edema and her breathing is significantly better.    Impression:  1.  Idiopathic cardiomyopathy there has been a significant improvement in the patient's left ventricular systolic function as described above she now has mildly reduced left ventricular systolic function.  2.  SVT.  The patient has not noted any symptomatic episodes.  3.  Paroxysmal atrial fibrillation with rate control using carvedilol and anticoagulated with Eliquis.  4.  Mild nonflow-limiting coronary artery disease  5.  Lipids are not at goal for a patient with coronary artery  disease and is not at secondary prevention guidelines.  The LDL was 81 HDL 42 and triglycerides 151 with a total cholesterol 153.    Plan:  1.  I will increase the dose of rosuvastatin from 5 mg to 20 mg which is the lowest dose for secondary prevention guidelines and I will recheck her lipids in 6 weeks time with an ALT.  2.  I will increase the dose of lisinopril from 2.5 mg to 5 mg.  3.  I will have the patient follow-up with Yenny houser in 3 months time and with me in 6 months time.  In 6 months time I will repeat her echocardiogram to see if there is any further improvement in her ejection fraction.  4.  The patient would not have limitation in her aerobic exercise with an EF in the 40 to 45% range and I encouraged her to exercise more.    As always the patient been told to contact if she has any questions or any concerns.    Parker Claros MD, FACC, FRCPI      Orders Placed This Encounter   Procedures    Lipid Profile    ALT    Basic metabolic panel    Basic metabolic panel    Follow-Up with Cardiology DARYL    Follow-Up with Cardiology    Echocardiogram Complete       Orders Placed This Encounter   Medications    rosuvastatin (CRESTOR) 20 MG tablet     Sig: Take 1 tablet (20 mg) by mouth daily     Dispense:  90 tablet     Refill:  4    lisinopril (ZESTRIL) 5 MG tablet     Sig: Take 1 tablet (5 mg) by mouth daily     Dispense:  90 tablet     Refill:  4       Medications Discontinued During This Encounter   Medication Reason    rosuvastatin (CRESTOR) 5 MG tablet     lisinopril (ZESTRIL) 2.5 MG tablet Reorder (No AVS)         Encounter Diagnoses   Name Primary?    Cardiomyopathy, unspecified type (H)     Acute systolic congestive heart failure (H)     AVNRT (AV elizabeth re-entry tachycardia)     Acute on chronic systolic heart failure (H) Yes    PAF (paroxysmal atrial fibrillation) (H)     SVT (supraventricular tachycardia)     Acute congestive heart failure, unspecified heart failure type (H)     Coronary  artery disease involving native coronary artery of native heart without angina pectoris        CURRENT MEDICATIONS:  Current Outpatient Medications   Medication Sig Dispense Refill    apixaban ANTICOAGULANT (ELIQUIS ANTICOAGULANT) 5 MG tablet Take 1 tablet (5 mg) by mouth 2 times daily 180 tablet 4    budesonide (PULMICORT FLEXHALER) 180 MCG/ACT inhaler INHALE 2 PUFFS INTO THE LUNGS TWICE DAILY Due for appointment. Please schedule: [phone] 1 each 11    carvedilol (COREG) 6.25 MG tablet Take 2 tablets (12.5 mg) by mouth 2 times daily (with meals) (Patient taking differently: Take 6.25 mg by mouth 2 times daily (with meals) 1 1/2 tab twice a day) 360 tablet 4    cetirizine (ZYRTEC) 10 MG tablet Take 10 mg by mouth every morning      cyclobenzaprine (FLEXERIL) 5 MG tablet Take 1 tablet (5 mg) by mouth 3 times daily as needed for muscle spasms 42 tablet 11    empagliflozin (JARDIANCE) 10 MG TABS tablet Take 1 tablet (10 mg) by mouth daily 90 tablet 3    furosemide (LASIX) 40 MG tablet Take 0.5 tablets (20 mg) by mouth daily 45 tablet 0    guaiFENesin (MUCINEX) 600 MG 12 hr tablet Take 1,200 mg by mouth 2 times daily as needed for congestion      Levothyroxine Sodium (LEVOTHROID PO) Take 175 mcg by mouth every evening 1 tab Tues - Sun. 0.5 tabs on Monday's.  (Follows with [facility]- Annual basis)      lisinopril (ZESTRIL) 5 MG tablet Take 1 tablet (5 mg) by mouth daily 90 tablet 4    melatonin 5 MG tablet Take 5 mg by mouth nightly as needed for sleep      rosuvastatin (CRESTOR) 20 MG tablet Take 1 tablet (20 mg) by mouth daily 90 tablet 4    spironolactone (ALDACTONE) 25 MG tablet Take 0.5 tablets (12.5 mg) by mouth daily 45 tablet 4    SUMAtriptan (IMITREX) 100 MG tablet TAKE 1 TABLET BY MOUTH AT ONSET OF HEADACHE AS DIRECTED 9 tablet 11    UNABLE TO FIND Nebulizer at home PRN      vitamin D3 (CHOLECALCIFEROL) 50 mcg (2000 units) tablet Take 1 tablet by mouth daily         ALLERGIES     Allergies    Allergen Reactions    Atorvastatin Muscle Pain (Myalgia)    Montelukast Sodium Unknown    Pravastatin      Edema, cramping       PAST MEDICAL HISTORY:  Past Medical History:   Diagnosis Date    Asthma     Cellulitis     s/p I&D of wound    Hypertension     Hypothyroidism     Lower extremity edema     Melanoma (H) 2015    S/P appendectomy     S/P arthroscopic knee surgery     S/P  section     S/P lateral meniscectomy of right knee     S/P JOSEP (total abdominal hysterectomy)     Seasonal allergies        PAST SURGICAL HISTORY:  Past Surgical History:   Procedure Laterality Date    APPENDECTOMY      COLONOSCOPY N/A 2016    Procedure: COLONOSCOPY;  Surgeon: Axel Rivera MD;  Location:  GI    CV CORONARY ANGIOGRAM N/A 10/11/2023    Procedure: Coronary Angiogram;  Surgeon: Reinaldo Woodward MD;  Location:  HEART CARDIAC CATH LAB    CV LEFT HEART CATH N/A 10/11/2023    Procedure: Left Heart Catheterization;  Surgeon: Reinaldo Woodward MD;  Location:  HEART CARDIAC CATH LAB    CV LEFT VENTRICULOGRAM N/A 10/11/2023    Procedure: Left Ventriculogram;  Surgeon: Reinaldo Woodward MD;  Location: [facility]    HYSTERECTOMY, PAP NO LONGER INDICATED         FAMILY HISTORY:  Family History   Problem Relation Age of Onset    Heart Failure Mother         later in life    Thyroid Disease Mother         graves disease    Arthritis Mother         Rheumatoid    Osteoporosis Mother     Rheumatoid Arthritis Mother     Hypertension Father     Heart Failure Father         later onset in life    Heart Surgery Father         stents palced x2    Mitral valve prolapse Father     Cerebrovascular Disease Maternal Grandmother          approx 72    Family History Negative Maternal Grandfather     Cerebrovascular Disease Paternal Grandmother          from    Alzheimer Disease Paternal Grandfather          from complications of    Hypertension Brother     Prostate Cancer Brother      Hypertension Sister     Hypothyroidism Sister     Family History Negative Son     Family History Negative Son     Obesity Son         Bariatric surgery 2023    Family History Negative Daughter     Colon Cancer No family hx of        SOCIAL HISTORY:  Social History     Socioeconomic History    Marital status: Single     Spouse name: None    Number of children: None    Years of education: None    Highest education level: None   Tobacco Use    Smoking status: Former     Packs/day: 0.25     Years: 3.00     Additional pack years: 0.00     Total pack years: 0.75     Types: Cigarettes     Start date: 10/1/2019     Quit date: 10/1/2023     Years since quittin.4    Smokeless tobacco: Never   Vaping Use    Vaping Use: Never used   Substance and Sexual Activity    Alcohol use: Yes     Alcohol/week: 0.0 standard drinks of alcohol     Comment: rare use, 3-4 x per year    Drug use: No    Sexual activity: Yes     Partners: Male     Social Determinants of Health     Financial Resource Strain: Low Risk  (2023)    Financial Resource Strain     Within the past 12 months, have you or your family members you live with been unable to get utilities (heat, electricity) when it was really needed?: No   Food Insecurity: Low Risk  (2023)    Food Insecurity     Within the past 12 months, did you worry that your food would run out before you got money to buy more?: No     Within the past 12 months, did the food you bought just not last and you didn t have money to get more?: No   Transportation Needs: Low Risk  (2023)    Transportation Needs     Within the past 12 months, has lack of transportation kept you from medical appointments, getting your medicines, non-medical meetings or appointments, work, or from getting things that you need?: No   Interpersonal Safety: Low Risk  (2023)    Interpersonal Safety     Do you feel physically and emotionally safe where you currently live?: Yes     Within the past 12 months,  "have you been hit, slapped, kicked or otherwise physically hurt by someone?: No     Within the past 12 months, have you been humiliated or emotionally abused in other ways by your partner or ex-partner?: No   Housing Stability: Low Risk  (12/23/2023)    Housing Stability     Do you have housing? : Yes     Are you worried about losing your housing?: No   Recent Concern: Housing Stability - High Risk (10/18/2023)    Housing Stability     Do you have housing? : No     Are you worried about losing your housing?: No       Review of Systems:  Skin:  not assessed       Eyes:  not assessed      ENT:  not assessed      Respiratory:  Negative       Cardiovascular:  Negative      Gastroenterology: not assessed      Genitourinary:  not assessed      Musculoskeletal:  not assessed      Neurologic:  not assessed      Psychiatric:  not assessed      Heme/Lymph/Imm:  not assessed      Endocrine:  not assessed        Physical Exam:  Vitals: /78 (BP Location: Right arm, Patient Position: Sitting, Cuff Size: Adult Regular)   Pulse 54   Ht 1.702 m (5' 7\")   Wt 89.1 kg (196 lb 8 oz)   SpO2 99%   BMI 30.78 kg/m      Constitutional:  cooperative, alert and oriented, well developed, well nourished, in no acute distress overweight      Skin:  warm and dry to the touch          Head:  no masses or lesions        Eyes:  pupils equal and round        Lymph:      ENT:  no pallor or cyanosis        Neck:  carotid pulses are full and equal bilaterally, JVP normal, no carotid bruit        Respiratory:  normal breath sounds, clear to auscultation, normal A-P diameter, normal symmetry, normal respiratory excursion, no use of accessory muscles         Cardiac: regular rhythm;normal S1 and S2;no murmurs, gallops or rubs detected   S4            pulses full and equal                                        GI:           Extremities and Muscular Skeletal:  no deformities, clubbing, cyanosis, erythema observed;no edema          "     Neurological:  no gross motor deficits;affect appropriate        Psych:  Alert and Oriented x 3        CC  Parker Claros MD  [address]  [place] [address]                  Thank you for allowing me to participate in the care of your patient.      Sincerely,     Parker Claros MD, MD     [facility]

## 2024-03-18 NOTE — PROGRESS NOTES
HPI and Plan:   It is my pleasure to see your patient Rhoda Ayala who is a 63-year-old patient who I saw in October in the hospital when she was admitted with heart failure and found to have an ejection fraction of 25%.  Patient had mild nonobstructive coronary artery disease confirming the diagnosis of idiopathic nonischemic cardiomyopathy.  The patient was also found to have frequent episodes of SVT.  Subsequent event monitoring showed that the patient was having some episodes of atrial fibrillation also and was started on Eliquis.  The patient was seen last month by electrophysiology specifically Dr. Moore he felt the patient should continue on her present AV elizabeth blocking agents with carvedilol and that in 6 months time they would repeat the event monitoring and follow-up with the EP at that stage.    With that background in mind the patient has been doing well she feels a lot better.  Echocardiography shows a significant improvement in ejection fraction which was performed 7 days ago.  The EF which was previously 25% is now in the 40 to 45% range.  Her blood pressure is well-controlled as you can see below.  She is not complaining of orthopnea PND or ankle edema and her breathing is significantly better.    Impression:  1.  Idiopathic cardiomyopathy there has been a significant improvement in the patient's left ventricular systolic function as described above she now has mildly reduced left ventricular systolic function.  2.  SVT.  The patient has not noted any symptomatic episodes.  3.  Paroxysmal atrial fibrillation with rate control using carvedilol and anticoagulated with Eliquis.  4.  Mild nonflow-limiting coronary artery disease  5.  Lipids are not at goal for a patient with coronary artery disease and is not at secondary prevention guidelines.  The LDL was 81 HDL 42 and triglycerides 151 with a total cholesterol 153.    Plan:  1.  I will increase the dose of rosuvastatin from 5 mg to 20 mg which is the  lowest dose for secondary prevention guidelines and I will recheck her lipids in 6 weeks time with an ALT.  2.  I will increase the dose of lisinopril from 2.5 mg to 5 mg.  3.  I will have the patient follow-up with Yenny houser in 3 months time and with me in 6 months time.  In 6 months time I will repeat her echocardiogram to see if there is any further improvement in her ejection fraction.  4.  The patient would not have limitation in her aerobic exercise with an EF in the 40 to 45% range and I encouraged her to exercise more.    As always the patient been told to contact if she has any questions or any concerns.    Parker Claros MD, FACC, FRCPI      Orders Placed This Encounter   Procedures    Lipid Profile    ALT    Basic metabolic panel    Basic metabolic panel    Follow-Up with Cardiology DARYL    Follow-Up with Cardiology    Echocardiogram Complete       Orders Placed This Encounter   Medications    rosuvastatin (CRESTOR) 20 MG tablet     Sig: Take 1 tablet (20 mg) by mouth daily     Dispense:  90 tablet     Refill:  4    lisinopril (ZESTRIL) 5 MG tablet     Sig: Take 1 tablet (5 mg) by mouth daily     Dispense:  90 tablet     Refill:  4       Medications Discontinued During This Encounter   Medication Reason    rosuvastatin (CRESTOR) 5 MG tablet     lisinopril (ZESTRIL) 2.5 MG tablet Reorder (No AVS)         Encounter Diagnoses   Name Primary?    Cardiomyopathy, unspecified type (H)     Acute systolic congestive heart failure (H)     AVNRT (AV elizabeth re-entry tachycardia)     Acute on chronic systolic heart failure (H) Yes    PAF (paroxysmal atrial fibrillation) (H)     SVT (supraventricular tachycardia)     Acute congestive heart failure, unspecified heart failure type (H)     Coronary artery disease involving native coronary artery of native heart without angina pectoris        CURRENT MEDICATIONS:  Current Outpatient Medications   Medication Sig Dispense Refill    apixaban ANTICOAGULANT (ELIQUIS  ANTICOAGULANT) 5 MG tablet Take 1 tablet (5 mg) by mouth 2 times daily 180 tablet 4    budesonide (PULMICORT FLEXHALER) 180 MCG/ACT inhaler INHALE 2 PUFFS INTO THE LUNGS TWICE DAILY Due for appointment. Please schedule: [phone] 1 each 11    carvedilol (COREG) 6.25 MG tablet Take 2 tablets (12.5 mg) by mouth 2 times daily (with meals) (Patient taking differently: Take 6.25 mg by mouth 2 times daily (with meals) 1 1/2 tab twice a day) 360 tablet 4    cetirizine (ZYRTEC) 10 MG tablet Take 10 mg by mouth every morning      cyclobenzaprine (FLEXERIL) 5 MG tablet Take 1 tablet (5 mg) by mouth 3 times daily as needed for muscle spasms 42 tablet 11    empagliflozin (JARDIANCE) 10 MG TABS tablet Take 1 tablet (10 mg) by mouth daily 90 tablet 3    furosemide (LASIX) 40 MG tablet Take 0.5 tablets (20 mg) by mouth daily 45 tablet 0    guaiFENesin (MUCINEX) 600 MG 12 hr tablet Take 1,200 mg by mouth 2 times daily as needed for congestion      Levothyroxine Sodium (LEVOTHROID PO) Take 175 mcg by mouth every evening 1 tab Tues - Sun. 0.5 tabs on Monday's.  (Follows with [facility]ocrine of [facility]- Annual basis)      lisinopril (ZESTRIL) 5 MG tablet Take 1 tablet (5 mg) by mouth daily 90 tablet 4    melatonin 5 MG tablet Take 5 mg by mouth nightly as needed for sleep      rosuvastatin (CRESTOR) 20 MG tablet Take 1 tablet (20 mg) by mouth daily 90 tablet 4    spironolactone (ALDACTONE) 25 MG tablet Take 0.5 tablets (12.5 mg) by mouth daily 45 tablet 4    SUMAtriptan (IMITREX) 100 MG tablet TAKE 1 TABLET BY MOUTH AT ONSET OF HEADACHE AS DIRECTED 9 tablet 11    UNABLE TO FIND Nebulizer at home PRN      vitamin D3 (CHOLECALCIFEROL) 50 mcg (2000 units) tablet Take 1 tablet by mouth daily         ALLERGIES     Allergies   Allergen Reactions    Atorvastatin Muscle Pain (Myalgia)    Montelukast Sodium Unknown    Pravastatin      Edema, cramping       PAST MEDICAL HISTORY:  Past Medical History:   Diagnosis Date    Asthma     Cellulitis     s/p  I&D of wound    Hypertension     Hypothyroidism     Lower extremity edema     Melanoma (H) 2015    S/P appendectomy     S/P arthroscopic knee surgery     S/P  section     S/P lateral meniscectomy of right knee     S/P JOSEP (total abdominal hysterectomy)     Seasonal allergies        PAST SURGICAL HISTORY:  Past Surgical History:   Procedure Laterality Date    APPENDECTOMY      COLONOSCOPY N/A 2016    Procedure: COLONOSCOPY;  Surgeon: Axel Rivera MD;  Location: [facility] GI    CV CORONARY ANGIOGRAM N/A 10/11/2023    Procedure: Coronary Angiogram;  Surgeon: Reinaldo Woodward MD;  Location: [facility]ARDI[facility] C[facility] LAB    CV LEFT HEART CATH N/A 10/11/2023    Procedure: Left Heart Catheterization;  Surgeon: Reinaldo Woodward MD;  Location: [facility]    CV LEFT VENTRICULOGRAM N/A 10/11/2023    Procedure: Left Ventriculogram;  Surgeon: Reinaldo Woodward MD;  Location: [facility]    HYSTERECTOMY, PAP NO LONGER INDICATED         FAMILY HISTORY:  Family History   Problem Relation Age of Onset    Heart Failure Mother         later in life    Thyroid Disease Mother         graves disease    Arthritis Mother         Rheumatoid    Osteoporosis Mother     Rheumatoid Arthritis Mother     Hypertension Father     Heart Failure Father         later onset in life    Heart Surgery Father         stents palced x2    Mitral valve prolapse Father     Cerebrovascular Disease Maternal Grandmother          approx 72    Family History Negative Maternal Grandfather     Cerebrovascular Disease Paternal Grandmother          from    Alzheimer Disease Paternal Grandfather          from complications of    Hypertension Brother     Prostate Cancer Brother     Hypertension Sister     Hypothyroidism Sister     Family History Negative Son     Family History Negative Son     Obesity Son         Bariatric surgery 2023    Family History Negative Daughter     Colon Cancer No family hx of         SOCIAL HISTORY:  Social History     Socioeconomic History    Marital status: Single     Spouse name: None    Number of children: None    Years of education: None    Highest education level: None   Tobacco Use    Smoking status: Former     Packs/day: 0.25     Years: 3.00     Additional pack years: 0.00     Total pack years: 0.75     Types: Cigarettes     Start date: 10/1/2019     Quit date: 10/1/2023     Years since quittin.4    Smokeless tobacco: Never   Vaping Use    Vaping Use: Never used   Substance and Sexual Activity    Alcohol use: Yes     Alcohol/week: 0.0 standard drinks of alcohol     Comment: rare use, 3-4 x per year    Drug use: No    Sexual activity: Yes     Partners: Male     Social Determinants of Health     Financial Resource Strain: Low Risk  (2023)    Financial Resource Strain     Within the past 12 months, have you or your family members you live with been unable to get utilities (heat, electricity) when it was really needed?: No   Food Insecurity: Low Risk  (2023)    Food Insecurity     Within the past 12 months, did you worry that your food would run out before you got money to buy more?: No     Within the past 12 months, did the food you bought just not last and you didn t have money to get more?: No   Transportation Needs: Low Risk  (2023)    Transportation Needs     Within the past 12 months, has lack of transportation kept you from medical appointments, getting your medicines, non-medical meetings or appointments, work, or from getting things that you need?: No   Interpersonal Safety: Low Risk  (2023)    Interpersonal Safety     Do you feel physically and emotionally safe where you currently live?: Yes     Within the past 12 months, have you been hit, slapped, kicked or otherwise physically hurt by someone?: No     Within the past 12 months, have you been humiliated or emotionally abused in other ways by your partner or ex-partner?: No   Housing Stability:  "Low Risk  (12/23/2023)    Housing Stability     Do you have housing? : Yes     Are you worried about losing your housing?: No   Recent Concern: Housing Stability - High Risk (10/18/2023)    Housing Stability     Do you have housing? : No     Are you worried about losing your housing?: No       Review of Systems:  Skin:  not assessed       Eyes:  not assessed      ENT:  not assessed      Respiratory:  Negative       Cardiovascular:  Negative      Gastroenterology: not assessed      Genitourinary:  not assessed      Musculoskeletal:  not assessed      Neurologic:  not assessed      Psychiatric:  not assessed      Heme/Lymph/Imm:  not assessed      Endocrine:  not assessed        Physical Exam:  Vitals: /78 (BP Location: Right arm, Patient Position: Sitting, Cuff Size: Adult Regular)   Pulse 54   Ht 1.702 m (5' 7\")   Wt 89.1 kg (196 lb 8 oz)   SpO2 99%   BMI 30.78 kg/m      Constitutional:  cooperative, alert and oriented, well developed, well nourished, in no acute distress overweight      Skin:  warm and dry to the touch          Head:  no masses or lesions        Eyes:  pupils equal and round        Lymph:      ENT:  no pallor or cyanosis        Neck:  carotid pulses are full and equal bilaterally, JVP normal, no carotid bruit        Respiratory:  normal breath sounds, clear to auscultation, normal A-P diameter, normal symmetry, normal respiratory excursion, no use of accessory muscles         Cardiac: regular rhythm;normal S1 and S2;no murmurs, gallops or rubs detected   S4            pulses full and equal                                        GI:           Extremities and Muscular Skeletal:  no deformities, clubbing, cyanosis, erythema observed;no edema              Neurological:  no gross motor deficits;affect appropriate        Psych:  Alert and Oriented x 3        CC  Parker Claros MD  [address]  [place] [id number]                "

## 2024-04-16 ENCOUNTER — MYC REFILL (OUTPATIENT)
Dept: CARDIOLOGY | Facility: CLINIC | Age: 64
End: 2024-04-16
Payer: COMMERCIAL

## 2024-04-16 DIAGNOSIS — I50.21 ACUTE SYSTOLIC CONGESTIVE HEART FAILURE (H): ICD-10-CM

## 2024-04-16 RX ORDER — FUROSEMIDE 40 MG
20 TABLET ORAL DAILY
Qty: 45 TABLET | Refills: 1 | Status: SHIPPED | OUTPATIENT
Start: 2024-04-16 | End: 2024-08-08

## 2024-05-02 ENCOUNTER — LAB (OUTPATIENT)
Dept: LAB | Facility: CLINIC | Age: 64
End: 2024-05-02
Payer: COMMERCIAL

## 2024-05-02 DIAGNOSIS — I25.10 CORONARY ARTERY DISEASE INVOLVING NATIVE CORONARY ARTERY OF NATIVE HEART WITHOUT ANGINA PECTORIS: ICD-10-CM

## 2024-05-02 LAB — ALT SERPL W P-5'-P-CCNC: 36 U/L (ref 0–50)

## 2024-05-02 PROCEDURE — 84460 ALANINE AMINO (ALT) (SGPT): CPT | Performed by: INTERNAL MEDICINE

## 2024-05-02 PROCEDURE — 36415 COLL VENOUS BLD VENIPUNCTURE: CPT | Performed by: INTERNAL MEDICINE

## 2024-05-02 PROCEDURE — 80061 LIPID PANEL: CPT | Performed by: INTERNAL MEDICINE

## 2024-05-03 LAB
CHOLEST SERPL-MCNC: 130 MG/DL
FASTING STATUS PATIENT QL REPORTED: YES
HDLC SERPL-MCNC: 35 MG/DL
LDLC SERPL CALC-MCNC: 52 MG/DL
NONHDLC SERPL-MCNC: 95 MG/DL
TRIGL SERPL-MCNC: 216 MG/DL

## 2024-05-28 ENCOUNTER — TRANSFERRED RECORDS (OUTPATIENT)
Dept: HEALTH INFORMATION MANAGEMENT | Facility: CLINIC | Age: 64
End: 2024-05-28
Payer: COMMERCIAL

## 2024-06-19 ENCOUNTER — TELEPHONE (OUTPATIENT)
Dept: CARDIOLOGY | Facility: CLINIC | Age: 64
End: 2024-06-19
Payer: COMMERCIAL

## 2024-06-19 NOTE — TELEPHONE ENCOUNTER
Results and recommendations sent to patient in a result note with request to reply or call with questions or concerns. Viri NORMAN

## 2024-06-19 NOTE — TELEPHONE ENCOUNTER
LDL cholesterol has significantly improved.  Recommend exercise and diet to reduce triglycerides and raise HDL.  Thanks

## 2024-06-19 NOTE — TELEPHONE ENCOUNTER
Reviewed lipids/alt showing    Latest Ref Rng 5/2/2024   !LIPID/HEPATIC     Cholesterol <200 mg/dL 130    Triglycerides <150 mg/dL 216 (H)    HDL Cholesterol >=50 mg/dL 35 (L)    LDL Cholesterol Calculated <=100 mg/dL 52    VLDL-Cholesterol 0 - 30 mg/dL    Non HDL Cholesterol <130 mg/dL 95    Cholesterol/HDL Ratio 0.0 - 5.0     AST 0 - 45 U/L    ALT 0 - 50 U/L 36       Legend:  (H) High  (L) Low    Per office note dated 3/18/24, Dr. Claros recommended:   1.  I will increase the dose of rosuvastatin from 5 mg to 20 mg which is the lowest dose for secondary prevention guidelines and I will recheck her lipids in 6 weeks time with an ALT.  2.  I will increase the dose of lisinopril from 2.5 mg to 5 mg.  3.  I will have the patient follow-up with Yenny houser in 3 months time and with me in 6 months time.  In 6 months time I will repeat her echocardiogram to see if there is any further improvement in her ejection fraction.  4.  The patient would not have limitation in her aerobic exercise with an EF in the 40 to 45% range and I encouraged her to exercise more.    Will message Dr. Claros to review. Viri NORMAN

## 2024-07-15 RX ORDER — FUROSEMIDE 40 MG
TABLET ORAL
Qty: 45 TABLET | Refills: 0 | OUTPATIENT
Start: 2024-07-15

## 2024-07-25 ENCOUNTER — ORDERS ONLY (AUTO-RELEASED) (OUTPATIENT)
Dept: CARDIOLOGY | Facility: CLINIC | Age: 64
End: 2024-07-25
Payer: COMMERCIAL

## 2024-07-25 DIAGNOSIS — I48.0 PAF (PAROXYSMAL ATRIAL FIBRILLATION) (H): ICD-10-CM

## 2024-08-08 ENCOUNTER — MYC REFILL (OUTPATIENT)
Dept: CARDIOLOGY | Facility: CLINIC | Age: 64
End: 2024-08-08
Payer: COMMERCIAL

## 2024-08-08 DIAGNOSIS — I50.21 ACUTE SYSTOLIC CONGESTIVE HEART FAILURE (H): ICD-10-CM

## 2024-08-08 RX ORDER — FUROSEMIDE 40 MG
20 TABLET ORAL DAILY
Qty: 45 TABLET | Refills: 1 | Status: SHIPPED | OUTPATIENT
Start: 2024-08-08

## 2024-08-12 ENCOUNTER — LAB (OUTPATIENT)
Dept: LAB | Facility: CLINIC | Age: 64
End: 2024-08-12
Payer: COMMERCIAL

## 2024-08-12 ENCOUNTER — OFFICE VISIT (OUTPATIENT)
Dept: CARDIOLOGY | Facility: CLINIC | Age: 64
End: 2024-08-12
Attending: INTERNAL MEDICINE
Payer: COMMERCIAL

## 2024-08-12 VITALS
BODY MASS INDEX: 30.78 KG/M2 | SYSTOLIC BLOOD PRESSURE: 144 MMHG | OXYGEN SATURATION: 96 % | DIASTOLIC BLOOD PRESSURE: 82 MMHG | HEIGHT: 67 IN | HEART RATE: 55 BPM

## 2024-08-12 DIAGNOSIS — I42.9 CARDIOMYOPATHY, UNSPECIFIED TYPE (H): ICD-10-CM

## 2024-08-12 DIAGNOSIS — I50.23 ACUTE ON CHRONIC SYSTOLIC HEART FAILURE (H): ICD-10-CM

## 2024-08-12 DIAGNOSIS — I50.9 ACUTE CONGESTIVE HEART FAILURE, UNSPECIFIED HEART FAILURE TYPE (H): ICD-10-CM

## 2024-08-12 DIAGNOSIS — I47.19 AVNRT (AV NODAL RE-ENTRY TACHYCARDIA) (H): ICD-10-CM

## 2024-08-12 DIAGNOSIS — I48.0 PAF (PAROXYSMAL ATRIAL FIBRILLATION) (H): ICD-10-CM

## 2024-08-12 DIAGNOSIS — I25.10 CORONARY ARTERY DISEASE INVOLVING NATIVE CORONARY ARTERY OF NATIVE HEART WITHOUT ANGINA PECTORIS: ICD-10-CM

## 2024-08-12 DIAGNOSIS — I47.10 SVT (SUPRAVENTRICULAR TACHYCARDIA) (H): ICD-10-CM

## 2024-08-12 DIAGNOSIS — I50.21 ACUTE SYSTOLIC CONGESTIVE HEART FAILURE (H): ICD-10-CM

## 2024-08-12 LAB
ANION GAP SERPL CALCULATED.3IONS-SCNC: 15 MMOL/L (ref 7–15)
BUN SERPL-MCNC: 13.6 MG/DL (ref 8–23)
CALCIUM SERPL-MCNC: 9.7 MG/DL (ref 8.8–10.4)
CHLORIDE SERPL-SCNC: 102 MMOL/L (ref 98–107)
CREAT SERPL-MCNC: 0.9 MG/DL (ref 0.51–0.95)
EGFRCR SERPLBLD CKD-EPI 2021: 71 ML/MIN/1.73M2
GLUCOSE SERPL-MCNC: 103 MG/DL (ref 70–99)
HCO3 SERPL-SCNC: 24 MMOL/L (ref 22–29)
POTASSIUM SERPL-SCNC: 4.3 MMOL/L (ref 3.4–5.3)
SODIUM SERPL-SCNC: 141 MMOL/L (ref 135–145)

## 2024-08-12 PROCEDURE — G2211 COMPLEX E/M VISIT ADD ON: HCPCS | Performed by: INTERNAL MEDICINE

## 2024-08-12 PROCEDURE — 36415 COLL VENOUS BLD VENIPUNCTURE: CPT

## 2024-08-12 PROCEDURE — 80048 BASIC METABOLIC PNL TOTAL CA: CPT

## 2024-08-12 PROCEDURE — 99215 OFFICE O/P EST HI 40 MIN: CPT | Performed by: INTERNAL MEDICINE

## 2024-08-12 RX ORDER — LISINOPRIL 10 MG/1
10 TABLET ORAL DAILY
Qty: 90 TABLET | Refills: 3 | Status: SHIPPED | OUTPATIENT
Start: 2024-08-12

## 2024-08-12 NOTE — PATIENT INSTRUCTIONS
Call CORE nurse for any questions or concerns Mon-Fri 8am-4pm:                                                 #[phone]                                       For concerns after hours:                                               #[phone]      Medication changes:     Increase lisinopril to 10 mg once daily.     Plan from today:     Follow up with Leobardo Figueredo in 3 months with an echocardiogram and labs prior.

## 2024-08-12 NOTE — PROGRESS NOTES
Cardiology Clinic Progress Note  Rhoda Ayala MRN# [id number]   YOB: 1960 Age: 63 year old   Primary Cardiologist: Dr. Claros  Reason for visit: CORE follow-up             Assessment and Plan:   Rhoda Ayala is a very pleasant 63 year old female who is here today for CORE follow-up.      1.  HFrEF and nonischemic cardiomyopathy  - LVEF: 20-25% on echo 10/2023, improved to 35-40% on echo 11/2023, further improved to 40 to 45% on echo 3/2024  - Etiology: nonischemic  - Fluid status: euvolemic  - Diuretic regimen: Lasix 20 mg once daily; needs an extra 20 mg about 4 times monthly  - Ischemic evaluation: Cor angio 10/11/2023 revealed mild nonobstructive CAD  - Guideline directed medical therapy:  - Beta blocker: Carvedilol 12.5 mg twice daily  - ACEI/ARB/ARNI: Increase lisinopril to 10 mg once daily   - Aldactone antagonist: Spironolactone 12.5 mg once daily  - SGLT2 inhibitor: Jardiance 10 mg once daily  -Counseled patient on sodium restriction  2.  Paroxysmal atrial arrhythmias including atrial fibrillation and SVT.  On Eliquis 5 mg twice daily for cardioembolic risk reduction.  Recently completed repeat Ziopatch monitor per EP request - results pending.  3.  Hypertension, BP well-controlled on current regimen  4.  Mild nonobstructive coronary artery disease by coronary angio 10/11/2023.  On statin.  5.  Hyperlipidemia, on rosuvastatin.  6.  Hypothyroidism, on levothyroxine    Changes today: Increase lisinopril to 10 mg once daily     Ms. Ayala continues to feel well from a cardiac perspective.  Denies any symptoms concerning for angina or decompensated heart failure.  Weight remains stable on current diuretic regimen.  BMP completed today-results pending.  Given mildly elevated blood pressure in clinic today and home SBP 130s, recommend increasing lisinopril to 10 mg once daily to further optimize GDMT regimen.  Repeat echocardiogram + labs in 3 months with subsequent Dr. Leobardo Figueredo follow-up.      Repeat Ziopatch monitor completed - results pending. Will contact patient with results once they become available.     Yenny Stringer PA-C  [facility]  Pager: [phone]          History of Presenting Illness:    Rhoda Ayala is a very pleasant 63 year old female with a history of hypothyroidism and hypertension.      Patient presented to [facility] ED 10/10/2023 with exertional shortness of breath and chest discomfort.  ECG on admission showed NSR with nonspecific T wave changes. Labs notable for elevated NT proBNP at 3228, troponin mildly elevated at 29 and flat.  Normal renal function and electrolytes.  Blood counts normal with exception of mildly elevated WBC at 11.6.  Echocardiogram 10/10/2023 revealed severely decreased LV systolic function with LVEF 20 to 25%, moderately dilated LV with severe global hypokinesia of the LV.  RV was normal with respect to size and function.  LA moderately dilated.  2+ MR was also noted.  Coronary angiogram was completed 10/11/2023 that revealed mild nonobstructive coronary artery disease.  Patient was having recurrent episodes of SVT during the case that did not resolve with Valsalva but did resolve with carotid sinus massage.  She was diuresed and medications were optimized. At time of discharge, patient was started on Lasix 40 mg once daily.  Referral to electrophysiology was recommended given evidence of AVNRT during her admission as well as cardiac event monitor placement at time of discharge.     Dr. Claros started patient on Eliquis 5 mg twice daily and uptitrated carvedilol to 9.375 mg twice daily after cardiac monitor revealed evidence of atrial fibrillation 11/13/2023. Ms. Ayala had a repeat echocardiogram 11/13/2023 that showed LVEF 35-40% with moderate global hypokinesia of the LV, normal RV size and function, and mild to moderate MR (1-2+).      At the time of her CORE enrollment/hospital follow up appointment 12/2023, she was  started on Jardiance. Additionally, Ziopatch monitor was recommended to evaluate burden of atrial fibrillation as well as ventricular rates while in AFIB given ongoing episodic palpitations.     Zio patch monitor was worn 12/10/2023 to 12/17/2023 and revealed normal sinus rhythm with an average heart rate of 74 bpm.  There was no evidence of atrial fibrillation, but patient was noted to have episodes of SVT with the longest episode lasting 7 minutes and 30 seconds at an average rate of 122 bpm.  Patient was also noted to have occasional PVCs (1.5%) and occasional PACs (1.6%). I increased her beta blocker.     She was seen by EP, Dr. Moore, 2/7/2024 at which time her low burden of atrial arrhythmias was not felt to be contributing to her cardiomyopathy.  No medication changes were made and repeat cardiac monitor was recommended in 6 months.    Repeat echocardiogram 3/11/2024 revealed improvement in LVEF to 40 to 45%.  Lisinopril was increased from 2.5 mg once daily to 5 mg once daily.  Additionally, rosuvastatin was increased from 5 mg daily to 20 mg daily.    Patient is here today for CORE follow-up. Ms. Ayala continues to feel well from a cardiac perspective.  Denies chest pain, palpitations, lightheadedness, dizziness, near-syncope or syncope.  No shortness of breath, orthopnea or PND.    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  When she notices this edema, she takes an extra 20 mg of Lasix and it resolves by the next morning.    She recently completed her repeat Zio patch monitor-mailed this in on Friday.    Takes all medications daily as prescribed.  Blood pressure mildly elevated at 144/82 in clinic today.  Blood pressures at home with SBP 130s.    Works out with a  2 times per month.  In addition to this, she walks on the treadmill, uses the elliptical, and perform some light weight training.  Very  rare alcohol consumption, maybe once yearly.  No illicit drug use.  No tobacco use.       Social History       Social History     Socioeconomic History    Marital status: Single     Spouse name: Not on file    Number of children: Not on file    Years of education: Not on file    Highest education level: Not on file   Occupational History    Not on file   Tobacco Use    Smoking status: Former     Current packs/day: 0.00     Average packs/day: 0.3 packs/day for 4.0 years (1.0 ttl pk-yrs)     Types: Cigarettes     Start date: 10/1/2019     Quit date: 10/1/2023     Years since quittin.8    Smokeless tobacco: Never   Vaping Use    Vaping status: Never Used   Substance and Sexual Activity    Alcohol use: Yes     Alcohol/week: 0.0 standard drinks of alcohol     Comment: rare use, 3-4 x per year    Drug use: No    Sexual activity: Yes     Partners: Male   Other Topics Concern    Parent/sibling w/ CABG, MI or angioplasty before 65F 55M? Not Asked   Social History Narrative    Not on file     Social Determinants of Health     Financial Resource Strain: Low Risk  (2023)    Financial Resource Strain     Within the past 12 months, have you or your family members you live with been unable to get utilities (heat, electricity) when it was really needed?: No   Food Insecurity: Low Risk  (2023)    Food Insecurity     Within the past 12 months, did you worry that your food would run out before you got money to buy more?: No     Within the past 12 months, did the food you bought just not last and you didn t have money to get more?: No   Transportation Needs: Low Risk  (2023)    Transportation Needs     Within the past 12 months, has lack of transportation kept you from medical appointments, getting your medicines, non-medical meetings or appointments, work, or from getting things that you need?: No   Physical Activity: Not on file   Stress: Not on file   Social Connections: Not on file   Interpersonal Safety: Low  "Risk  (12/26/2023)    Interpersonal Safety     Do you feel physically and emotionally safe where you currently live?: Yes     Within the past 12 months, have you been hit, slapped, kicked or otherwise physically hurt by someone?: No     Within the past 12 months, have you been humiliated or emotionally abused in other ways by your partner or ex-partner?: No   Housing Stability: Low Risk  (12/23/2023)    Housing Stability     Do you have housing? : Yes     Are you worried about losing your housing?: No   Recent Concern: Housing Stability - High Risk (10/18/2023)    Housing Stability     Do you have housing? : No     Are you worried about losing your housing?: No            Review of Systems:   Please see HPI         Physical Exam:   Vitals: BP (!) 144/82 (BP Location: Right arm, Patient Position: Sitting, Cuff Size: Adult Regular)   Pulse 55   Ht 1.702 m (5' 7\")   SpO2 96%   BMI 30.78 kg/m     Wt Readings from Last 4 Encounters:   03/18/24 89.1 kg (196 lb 8 oz)   02/07/24 88.9 kg (196 lb)   01/09/24 89.6 kg (197 lb 9.6 oz)   12/26/23 88.8 kg (195 lb 12.8 oz)     GEN: well nourished, in no acute distress.  HEENT:  Pupils equal, round. Sclerae nonicteric.   NECK: Supple, no masses appreciated.  No JVD  C/V:  Regular rhythm, bradycardic.  No murmur, rub or gallop.    RESP: Respirations are unlabored. Clear to auscultation bilaterally without wheezing, rales, or rhonchi.  GI: Abdomen soft, nontender.  EXTREM: No LE edema.  NEURO: Alert and oriented, cooperative.  SKIN: Warm and dry.        Data:   LIPID RESULTS:  Lab Results   Component Value Date    CHOL 130 05/02/2024    CHOL 154 12/09/2020    HDL 35 (L) 05/02/2024    HDL 34 (L) 12/09/2020    LDL 52 05/02/2024    LDL 65 12/09/2020    TRIG 216 (H) 05/02/2024    TRIG 276 (H) 12/09/2020    CHOLHDLRATIO 7.0 (H) 06/16/2014     LIVER ENZYME RESULTS:  Lab Results   Component Value Date    AST 24 12/26/2023    AST 21 12/09/2020    ALT 36 05/02/2024    ALT 50 12/09/2020 " "    CBC RESULTS:  Lab Results   Component Value Date    WBC 8.3 12/26/2023    WBC 8.6 12/09/2020    RBC 4.52 12/26/2023    RBC 4.32 12/09/2020    HGB 13.5 12/26/2023    HGB 13.2 12/09/2020    HCT 40.9 12/26/2023    HCT 39.4 12/09/2020    MCV 91 12/26/2023    MCV 91 12/09/2020    MCH 29.9 12/26/2023    MCH 30.6 12/09/2020    MCHC 33.0 12/26/2023    MCHC 33.5 12/09/2020    RDW 11.9 12/26/2023    RDW 12.6 12/09/2020     12/26/2023     12/09/2020     BMP RESULTS:  Lab Results   Component Value Date     01/07/2024     12/09/2020    POTASSIUM 4.1 01/07/2024    POTASSIUM 4.2 02/23/2022    POTASSIUM 3.9 12/09/2020    CHLORIDE 102 01/07/2024    CHLORIDE 107 02/23/2022    CHLORIDE 106 12/09/2020    CO2 26 01/07/2024    CO2 23 02/23/2022    CO2 27 12/09/2020    ANIONGAP 11 01/07/2024    ANIONGAP 9 02/23/2022    ANIONGAP 6 12/09/2020     (H) 01/07/2024     (H) 02/23/2022     (H) 12/09/2020    BUN 13.9 01/07/2024    BUN 13 02/23/2022    BUN 12 12/09/2020    CR 0.94 01/07/2024    CR 0.84 12/09/2020    GFRESTIMATED 67 01/07/2024    GFRESTIMATED 75 12/09/2020    GFRESTBLACK 87 12/09/2020    DIANNA 9.9 01/07/2024    DIANNA 9.6 12/09/2020      A1C RESULTS:  Lab Results   Component Value Date    A1C 5.4 12/26/2023    A1C 5.7 (H) 12/21/2018     INR RESULTS:  No results found for: \"INR\"         Medications     Current Outpatient Medications   Medication Sig Dispense Refill    apixaban ANTICOAGULANT (ELIQUIS ANTICOAGULANT) 5 MG tablet Take 1 tablet (5 mg) by mouth 2 times daily 180 tablet 4    budesonide (PULMICORT FLEXHALER) 180 MCG/ACT inhaler INHALE 2 PUFFS INTO THE LUNGS TWICE DAILY Due for appointment. Please schedule: [phone] 1 each 11    carvedilol (COREG) 6.25 MG tablet Take 2 tablets (12.5 mg) by mouth 2 times daily (with meals) (Patient taking differently: Take 6.25 mg by mouth 2 times daily (with meals) 1 1/2 tab twice a day) 360 tablet 4    cetirizine (ZYRTEC) 10 MG tablet Take 10 " mg by mouth every morning      cyclobenzaprine (FLEXERIL) 5 MG tablet Take 1 tablet (5 mg) by mouth 3 times daily as needed for muscle spasms 42 tablet 11    empagliflozin (JARDIANCE) 10 MG TABS tablet Take 1 tablet (10 mg) by mouth daily 90 tablet 3    furosemide (LASIX) 40 MG tablet Take 0.5 tablets (20 mg) by mouth daily 45 tablet 1    guaiFENesin (MUCINEX) 600 MG 12 hr tablet Take 1,200 mg by mouth 2 times daily as needed for congestion      Levothyroxine Sodium (LEVOTHROID PO) Take 175 mcg by mouth every evening 1 tab Tues - Sun. 0.5 tabs on Monday's.  (Follows with [facility]- Annual basis)      lisinopril (ZESTRIL) 5 MG tablet Take 1 tablet (5 mg) by mouth daily 90 tablet 4    melatonin 5 MG tablet Take 5 mg by mouth nightly as needed for sleep      rosuvastatin (CRESTOR) 20 MG tablet Take 1 tablet (20 mg) by mouth daily 90 tablet 4    spironolactone (ALDACTONE) 25 MG tablet Take 0.5 tablets (12.5 mg) by mouth daily 45 tablet 4    SUMAtriptan (IMITREX) 100 MG tablet TAKE 1 TABLET BY MOUTH AT ONSET OF HEADACHE AS DIRECTED 9 tablet 11    UNABLE TO FIND Nebulizer at home PRN      vitamin D3 (CHOLECALCIFEROL) 50 mcg (2000 units) tablet Take 1 tablet by mouth daily            Past Medical History     Past Medical History:   Diagnosis Date    Asthma     Cellulitis     s/p I&D of wound    Hypertension     Hypothyroidism     Lower extremity edema     Melanoma (H) 2015    S/P appendectomy     S/P arthroscopic knee surgery     S/P  section     S/P lateral meniscectomy of right knee     S/P JOSEP (total abdominal hysterectomy)     Seasonal allergies      Past Surgical History:   Procedure Laterality Date    APPENDECTOMY      COLONOSCOPY N/A 2016    Procedure: COLONOSCOPY;  Surgeon: Axel Rivera MD;  Location:  GI    CV CORONARY ANGIOGRAM N/A 10/11/2023    Procedure: Coronary Angiogram;  Surgeon: Reinaldo Woodward MD;  Location:  HEART CARDIAC CATH LAB    CV LEFT HEART CATH N/A 10/11/2023     Procedure: Left Heart Catheterization;  Surgeon: Reinaldo Woodward MD;  Location:  HEART CARDIAC CATH LAB    CV LEFT VENTRICULOGRAM N/A 10/11/2023    Procedure: Left Ventriculogram;  Surgeon: Reinaldo Woodward MD;  Location:  HEART CARDIAC CATH LAB    HYSTERECTOMY, PAP NO LONGER INDICATED       Family History   Problem Relation Age of Onset    Heart Failure Mother         later in life    Thyroid Disease Mother         graves disease    Arthritis Mother         Rheumatoid    Osteoporosis Mother     Rheumatoid Arthritis Mother     Hypertension Father     Heart Failure Father         later onset in life    Heart Surgery Father         stents palced x2    Mitral valve prolapse Father     Cerebrovascular Disease Maternal Grandmother          approx 72    Family History Negative Maternal Grandfather     Cerebrovascular Disease Paternal Grandmother          from    Alzheimer Disease Paternal Grandfather          from complications of    Hypertension Brother     Prostate Cancer Brother     Hypertension Sister     Hypothyroidism Sister     Family History Negative Son     Family History Negative Son     Obesity Son         Bariatric surgery 2023    Family History Negative Daughter     Colon Cancer No family hx of             Allergies   Atorvastatin, Montelukast sodium, and Pravastatin    The longitudinal plan of care for the diagnosis(es)/condition(s) as documented were addressed during this visit. Due to the added complexity in care, I will continue to support Rhoda in the subsequent management and with ongoing continuity of care.     40 minutes spent on the date of the encounter doing chart review, history and exam, documentation and further activities as noted above    Yenny Stringer PA-C  [facility]  Pager: [phone]

## 2024-08-12 NOTE — LETTER
8/12/2024    Fili Vasquez MD, MD  [address]  [place] [address]    RE: Rhoda Ayala       Dear Colleague,     I had the pleasure of seeing Rhoda Ayala in the [facility].  Cardiology Clinic Progress Note  Rhoda Ayala MRN# [id number]   YOB: 1960 Age: 63 year old   Primary Cardiologist: Dr. Claros  Reason for visit: CORE follow-up             Assessment and Plan:   Rhoda Ayala is a very pleasant 63 year old female who is here today for CORE follow-up.      1.  HFrEF and nonischemic cardiomyopathy  - LVEF: 20-25% on echo 10/2023, improved to 35-40% on echo 11/2023, further improved to 40 to 45% on echo 3/2024  - Etiology: nonischemic  - Fluid status: euvolemic  - Diuretic regimen: Lasix 20 mg once daily; needs an extra 20 mg about 4 times monthly  - Ischemic evaluation: Cor angio 10/11/2023 revealed mild nonobstructive CAD  - Guideline directed medical therapy:  - Beta blocker: Carvedilol 12.5 mg twice daily  - ACEI/ARB/ARNI: Increase lisinopril to 10 mg once daily   - Aldactone antagonist: Spironolactone 12.5 mg once daily  - SGLT2 inhibitor: Jardiance 10 mg once daily  -Counseled patient on sodium restriction  2.  Paroxysmal atrial arrhythmias including atrial fibrillation and SVT.  On Eliquis 5 mg twice daily for cardioembolic risk reduction.  Recently completed repeat Ziopatch monitor per EP request - results pending.  3.  Hypertension, BP well-controlled on current regimen  4.  Mild nonobstructive coronary artery disease by coronary angio 10/11/2023.  On statin.  5.  Hyperlipidemia, on rosuvastatin.  6.  Hypothyroidism, on levothyroxine    Changes today: Increase lisinopril to 10 mg once daily     Ms. Ayala continues to feel well from a cardiac perspective.  Denies any symptoms concerning for angina or decompensated heart failure.  Weight remains stable on current diuretic regimen.  BMP completed today-results pending.  Given mildly elevated blood  pressure in clinic today and home SBP 130s, recommend increasing lisinopril to 10 mg once daily to further optimize GDMT regimen.  Repeat echocardiogram + labs in 3 months with subsequent Dr. Leobardo Figueredo follow-up.     Repeat Ziopatch monitor completed - results pending. Will contact patient with results once they become available.     Yenny Stringer PA-C  [facility] - Heart Care  Pager: [phone]          History of Presenting Illness:    Rhoda Ayala is a very pleasant 63 year old female with a history of hypothyroidism and hypertension.      Patient presented to [facility] ED 10/10/2023 with exertional shortness of breath and chest discomfort.  ECG on admission showed NSR with nonspecific T wave changes. Labs notable for elevated NT proBNP at 3228, troponin mildly elevated at 29 and flat.  Normal renal function and electrolytes.  Blood counts normal with exception of mildly elevated WBC at 11.6.  Echocardiogram 10/10/2023 revealed severely decreased LV systolic function with LVEF 20 to 25%, moderately dilated LV with severe global hypokinesia of the LV.  RV was normal with respect to size and function.  LA moderately dilated.  2+ MR was also noted.  Coronary angiogram was completed 10/11/2023 that revealed mild nonobstructive coronary artery disease.  Patient was having recurrent episodes of SVT during the case that did not resolve with Valsalva but did resolve with carotid sinus massage.  She was diuresed and medications were optimized. At time of discharge, patient was started on Lasix 40 mg once daily.  Referral to electrophysiology was recommended given evidence of AVNRT during her admission as well as cardiac event monitor placement at time of discharge.     Dr. Claros started patient on Eliquis 5 mg twice daily and uptitrated carvedilol to 9.375 mg twice daily after cardiac monitor revealed evidence of atrial fibrillation 11/13/2023. Ms. Ayala had a repeat echocardiogram 11/13/2023  that showed LVEF 35-40% with moderate global hypokinesia of the LV, normal RV size and function, and mild to moderate MR (1-2+).      At the time of her CORE enrollment/hospital follow up appointment 12/2023, she was started on Jardiance. Additionally, Ziopatch monitor was recommended to evaluate burden of atrial fibrillation as well as ventricular rates while in AFIB given ongoing episodic palpitations.     Zio patch monitor was worn 12/10/2023 to 12/17/2023 and revealed normal sinus rhythm with an average heart rate of 74 bpm.  There was no evidence of atrial fibrillation, but patient was noted to have episodes of SVT with the longest episode lasting 7 minutes and 30 seconds at an average rate of 122 bpm.  Patient was also noted to have occasional PVCs (1.5%) and occasional PACs (1.6%). I increased her beta blocker.     She was seen by EP, Dr. Moore, 2/7/2024 at which time her low burden of atrial arrhythmias was not felt to be contributing to her cardiomyopathy.  No medication changes were made and repeat cardiac monitor was recommended in 6 months.    Repeat echocardiogram 3/11/2024 revealed improvement in LVEF to 40 to 45%.  Lisinopril was increased from 2.5 mg once daily to 5 mg once daily.  Additionally, rosuvastatin was increased from 5 mg daily to 20 mg daily.    Patient is here today for CORE follow-up. Ms. Ayala continues to feel well from a cardiac perspective.  Denies chest pain, palpitations, lightheadedness, dizziness, near-syncope or syncope.  No shortness of breath, orthopnea or PND.    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  When she notices this edema, she takes an extra 20 mg of Lasix and it resolves by the next morning.    She recently completed her repeat Zio patch monitor-mailed this in on Friday.    Takes all medications daily as prescribed.  Blood pressure mildly elevated at 144/82 in  clinic today.  Blood pressures at home with SBP 130s.    Works out with a  2 times per month.  In addition to this, she walks on the treadmill, uses the elliptical, and perform some light weight training.  Very rare alcohol consumption, maybe once yearly.  No illicit drug use.  No tobacco use.       Social History       Social History     Socioeconomic History     Marital status: Single     Spouse name: Not on file     Number of children: Not on file     Years of education: Not on file     Highest education level: Not on file   Occupational History     Not on file   Tobacco Use     Smoking status: Former     Current packs/day: 0.00     Average packs/day: 0.3 packs/day for 4.0 years (1.0 ttl pk-yrs)     Types: Cigarettes     Start date: 10/1/2019     Quit date: 10/1/2023     Years since quittin.8     Smokeless tobacco: Never   Vaping Use     Vaping status: Never Used   Substance and Sexual Activity     Alcohol use: Yes     Alcohol/week: 0.0 standard drinks of alcohol     Comment: rare use, 3-4 x per year     Drug use: No     Sexual activity: Yes     Partners: Male   Other Topics Concern     Parent/sibling w/ CABG, MI or angioplasty before 65F 55M? Not Asked   Social History Narrative     Not on file     Social Determinants of Health     Financial Resource Strain: Low Risk  (2023)    Financial Resource Strain      Within the past 12 months, have you or your family members you live with been unable to get utilities (heat, electricity) when it was really needed?: No   Food Insecurity: Low Risk  (2023)    Food Insecurity      Within the past 12 months, did you worry that your food would run out before you got money to buy more?: No      Within the past 12 months, did the food you bought just not last and you didn t have money to get more?: No   Transportation Needs: Low Risk  (2023)    Transportation Needs      Within the past 12 months, has lack of transportation kept you from medical  "appointments, getting your medicines, non-medical meetings or appointments, work, or from getting things that you need?: No   Physical Activity: Not on file   Stress: Not on file   Social Connections: Not on file   Interpersonal Safety: Low Risk  (12/26/2023)    Interpersonal Safety      Do you feel physically and emotionally safe where you currently live?: Yes      Within the past 12 months, have you been hit, slapped, kicked or otherwise physically hurt by someone?: No      Within the past 12 months, have you been humiliated or emotionally abused in other ways by your partner or ex-partner?: No   Housing Stability: Low Risk  (12/23/2023)    Housing Stability      Do you have housing? : Yes      Are you worried about losing your housing?: No   Recent Concern: Housing Stability - High Risk (10/18/2023)    Housing Stability      Do you have housing? : No      Are you worried about losing your housing?: No            Review of Systems:   Please see HPI         Physical Exam:   Vitals: BP (!) 144/82 (BP Location: Right arm, Patient Position: Sitting, Cuff Size: Adult Regular)   Pulse 55   Ht 1.702 m (5' 7\")   SpO2 96%   BMI 30.78 kg/m     Wt Readings from Last 4 Encounters:   03/18/24 89.1 kg (196 lb 8 oz)   02/07/24 88.9 kg (196 lb)   01/09/24 89.6 kg (197 lb 9.6 oz)   12/26/23 88.8 kg (195 lb 12.8 oz)     GEN: well nourished, in no acute distress.  HEENT:  Pupils equal, round. Sclerae nonicteric.   NECK: Supple, no masses appreciated.  No JVD  C/V:  Regular rhythm, bradycardic.  No murmur, rub or gallop.    RESP: Respirations are unlabored. Clear to auscultation bilaterally without wheezing, rales, or rhonchi.  GI: Abdomen soft, nontender.  EXTREM: No LE edema.  NEURO: Alert and oriented, cooperative.  SKIN: Warm and dry.        Data:   LIPID RESULTS:  Lab Results   Component Value Date    CHOL 130 05/02/2024    CHOL 154 12/09/2020    HDL 35 (L) 05/02/2024    HDL 34 (L) 12/09/2020    LDL 52 05/02/2024    LDL 65 " "12/09/2020    TRIG 216 (H) 05/02/2024    TRIG 276 (H) 12/09/2020    CHOLHDLRATIO 7.0 (H) 06/16/2014     LIVER ENZYME RESULTS:  Lab Results   Component Value Date    AST 24 12/26/2023    AST 21 12/09/2020    ALT 36 05/02/2024    ALT 50 12/09/2020     CBC RESULTS:  Lab Results   Component Value Date    WBC 8.3 12/26/2023    WBC 8.6 12/09/2020    RBC 4.52 12/26/2023    RBC 4.32 12/09/2020    HGB 13.5 12/26/2023    HGB 13.2 12/09/2020    HCT 40.9 12/26/2023    HCT 39.4 12/09/2020    MCV 91 12/26/2023    MCV 91 12/09/2020    MCH 29.9 12/26/2023    MCH 30.6 12/09/2020    MCHC 33.0 12/26/2023    MCHC 33.5 12/09/2020    RDW 11.9 12/26/2023    RDW 12.6 12/09/2020     12/26/2023     12/09/2020     BMP RESULTS:  Lab Results   Component Value Date     01/07/2024     12/09/2020    POTASSIUM 4.1 01/07/2024    POTASSIUM 4.2 02/23/2022    POTASSIUM 3.9 12/09/2020    CHLORIDE 102 01/07/2024    CHLORIDE 107 02/23/2022    CHLORIDE 106 12/09/2020    CO2 26 01/07/2024    CO2 23 02/23/2022    CO2 27 12/09/2020    ANIONGAP 11 01/07/2024    ANIONGAP 9 02/23/2022    ANIONGAP 6 12/09/2020     (H) 01/07/2024     (H) 02/23/2022     (H) 12/09/2020    BUN 13.9 01/07/2024    BUN 13 02/23/2022    BUN 12 12/09/2020    CR 0.94 01/07/2024    CR 0.84 12/09/2020    GFRESTIMATED 67 01/07/2024    GFRESTIMATED 75 12/09/2020    GFRESTBLACK 87 12/09/2020    DIANNA 9.9 01/07/2024    DIANNA 9.6 12/09/2020      A1C RESULTS:  Lab Results   Component Value Date    A1C 5.4 12/26/2023    A1C 5.7 (H) 12/21/2018     INR RESULTS:  No results found for: \"INR\"         Medications     Current Outpatient Medications   Medication Sig Dispense Refill     apixaban ANTICOAGULANT (ELIQUIS ANTICOAGULANT) 5 MG tablet Take 1 tablet (5 mg) by mouth 2 times daily 180 tablet 4     budesonide (PULMICORT FLEXHALER) 180 MCG/ACT inhaler INHALE 2 PUFFS INTO THE LUNGS TWICE DAILY Due for appointment. Please schedule: [phone] 1 each 11     " carvedilol (COREG) 6.25 MG tablet Take 2 tablets (12.5 mg) by mouth 2 times daily (with meals) (Patient taking differently: Take 6.25 mg by mouth 2 times daily (with meals) 1 1/2 tab twice a day) 360 tablet 4     cetirizine (ZYRTEC) 10 MG tablet Take 10 mg by mouth every morning       cyclobenzaprine (FLEXERIL) 5 MG tablet Take 1 tablet (5 mg) by mouth 3 times daily as needed for muscle spasms 42 tablet 11     empagliflozin (JARDIANCE) 10 MG TABS tablet Take 1 tablet (10 mg) by mouth daily 90 tablet 3     furosemide (LASIX) 40 MG tablet Take 0.5 tablets (20 mg) by mouth daily 45 tablet 1     guaiFENesin (MUCINEX) 600 MG 12 hr tablet Take 1,200 mg by mouth 2 times daily as needed for congestion       Levothyroxine Sodium (LEVOTHROID PO) Take 175 mcg by mouth every evening 1 tab Tues - Sun. 0.5 tabs on Monday's.  ([facility]- Annual basis)       lisinopril (ZESTRIL) 5 MG tablet Take 1 tablet (5 mg) by mouth daily 90 tablet 4     melatonin 5 MG tablet Take 5 mg by mouth nightly as needed for sleep       rosuvastatin (CRESTOR) 20 MG tablet Take 1 tablet (20 mg) by mouth daily 90 tablet 4     spironolactone (ALDACTONE) 25 MG tablet Take 0.5 tablets (12.5 mg) by mouth daily 45 tablet 4     SUMAtriptan (IMITREX) 100 MG tablet TAKE 1 TABLET BY MOUTH AT ONSET OF HEADACHE AS DIRECTED 9 tablet 11     UNABLE TO FIND Nebulizer at home PRN       vitamin D3 (CHOLECALCIFEROL) 50 mcg (2000 units) tablet Take 1 tablet by mouth daily            Past Medical History     Past Medical History:   Diagnosis Date     Asthma      Cellulitis     s/p I&D of wound     Hypertension      Hypothyroidism      Lower extremity edema      Melanoma (H) 2015     S/P appendectomy      S/P arthroscopic knee surgery      S/P  section      S/P lateral meniscectomy of right knee      S/P JOSEP (total abdominal hysterectomy)      Seasonal allergies      Past Surgical History:   Procedure Laterality Date     APPENDECTOMY        COLONOSCOPY N/A 2016    Procedure: COLONOSCOPY;  Surgeon: Axel Rivera MD;  Location: [facility]     CV CORONARY ANGIOGRAM N/A 10/11/2023    Procedure: Coronary Angiogram;  Surgeon: Reinaldo Woodward MD;  Location: [facility] CARDIAC CATH LAB     CV LEFT HEART CATH N/A 10/11/2023    Procedure: Left Heart Catheterization;  Surgeon: Reinaldo Woodward MD;  Location: [facility]ARD[facility] C[facility]     CV LEFT VENTRICULOGRAM N/A 10/11/2023    Procedure: Left Ventriculogram;  Surgeon: Reinaldo Woodward MD;  Location: [facility] CARDIAC CATH LAB     HYSTERECTOMY, PAP NO LONGER INDICATED       Family History   Problem Relation Age of Onset     Heart Failure Mother         later in life     Thyroid Disease Mother         graves disease     Arthritis Mother         Rheumatoid     Osteoporosis Mother      Rheumatoid Arthritis Mother      Hypertension Father      Heart Failure Father         later onset in life     Heart Surgery Father         stents palced x2     Mitral valve prolapse Father      Cerebrovascular Disease Maternal Grandmother          approx 72     Family History Negative Maternal Grandfather      Cerebrovascular Disease Paternal Grandmother          from     Alzheimer Disease Paternal Grandfather          from complications of     Hypertension Brother      Prostate Cancer Brother      Hypertension Sister      Hypothyroidism Sister      Family History Negative Son      Family History Negative Son      Obesity Son         Bariatric surgery 2023     Family History Negative Daughter      Colon Cancer No family hx of             Allergies   Atorvastatin, Montelukast sodium, and Pravastatin    The longitudinal plan of care for the diagnosis(es)/condition(s) as documented were addressed during this visit. Due to the added complexity in care, I will continue to support Rhoda in the subsequent management and with ongoing continuity of care.     40 minutes spent on the date of the encounter doing chart  review, history and exam, documentation and further activities as noted above    CARMELA Stanley [facility] - Heart Care  Pager: [phone]      Thank you for allowing me to participate in the care of your patient.      Sincerely,     CARMELA Stanley [facility]  cc:   Parker Claros MD  [address]  [place]  [place] [address]

## 2024-08-13 ENCOUNTER — PATIENT OUTREACH (OUTPATIENT)
Dept: CARDIOLOGY | Facility: CLINIC | Age: 64
End: 2024-08-13
Payer: COMMERCIAL

## 2024-08-13 NOTE — LETTER
[facility] - HEART FAILURE CARE COORDINATION   [address]  [place] [address]    August 13, 2024    Rhoda Ayala  [address] [place] [facility] [place] [facility] [place] [address]      Dear Rhoda,    As your heart failure care coordinator nurse, I will work with your Cardiology/C.O.R.E. team to support your heart health and help with any needs you may have.    Below is your plan of care as a patient in the heart failure care coordination program. Also refer to your  Self Help Guide for Patients with Heart Failure  booklet for additional information. If you have not received this booklet, please ask for one at your next clinic visit.    Please let us know if we can help you with other questions, concerns, or goals.    Sincerely,     Armida Paz RN BSN   [facility] [place], [place]  C.O.R.E. Clinic Care Coordinator  08/13/24, 4:56 PM        [facility]  Patient-Centered Heart Failure Access Plan of Care    About Me:        Patient Name:  Rhdoa Ayala    YOB: 1960  Age:         63 year old   [place] MRN:    [id number] Telephone Information:  Home Phone [phone]   Mobile [phone]       Address:  [address] [facility] [place] [address] Email address:  [email or web address]      Emergency Contact(s)    Name Relationship Lgl Grd Work Phone Home Phone Mobile Phone   1. JOANA CAMARA Son    [phone]   2. JANEY CAMARA Son    [phone]           Primary language:  English     needed? No   [facility] [employer]:  [phone] op. 1  Other communication barriers:No data recorded  Preferred Method of Communication:  Monico  Current living arrangement: No data recorded  Mobility Status/ Medical Equipment: No data recorded    My Access Plan  Medical Emergency 911   Cardiology Call Center (available 24/7) [phone]   Primary Clinic Line [facility]  [phone]   Preferred Pharmacy   [facility]  #[id number] - [facility] [address]  [address] [address] [address]  Phone: [phone] Fax: [phone]     Behavioral Health Crisis Line 988 Suicide and Crisis Lifeline       My Care Team Members  Patient Care Team         Relationship Specialty Notifications Start End    Fili Vasquez MD PCP - General Internal Medicine  12/5/23     Phone: [phone] Pager: [phone] Fax: [phone] [address] [place] [address]    Fili Vasquez MD Assigned PCP   6/21/13     Phone: [phone] Pager: [phone] Fax: [phone] [address] [place] [address]    Parker Claros MD MD Cardiovascular Disease  10/13/23     Phone: [phone] Fax: [phone] [address] [place] [address]    Yenny Stringer PA-C Physician Assistant Cardiology  11/2/23     Phone: [phone] Fax: [phone] [address] [place] [address]    Yenny Stringer PA-C Assigned Heart and Vascular Provider   1/25/24     Phone: [phone] Fax: [phone] [address] [place] [address]    Rn,  Cardiology C.O.R.E. Specialty Care Coordinator Cardiology  8/1/24     Phone: [phone] Fax: [phone]                    My Care Plans

## 2024-08-13 NOTE — PROGRESS NOTES
[facility]: Heart Failure Care Coordination   Documentation    Situation/Background:      Chief Complaint   Patient presents with    Clinic Care Coordination - Chart Review     Post CORE OV 8/13/24      Pt had CORE OV w/ KARLI Stanley yesterday 8/13/24.      Assessment:      Chart reviewed.     Medication changes:  Increase lisinopril to 10 mg once daily.      Plan from today:  Follow up with Leobardo Figueredo in 3 months with an echocardiogram and labs prior.       Intervention/Plan:      Pt has appropriate follow ups scheduled.     CORE CC POC letter sent to pt via [employer].     CORE CC RN will continue to monitor chart to be sure CORE follow up appts arranged after  11/27/24's TRACY w/ Dr. Leobardo Figueredo  Future Appointments   Date Time Provider Department Center   11/18/2024  8:30 AM [id number] [facility]   11/18/2024  9:30 AM RU LAB [facility] [place] RID   11/27/2024  3:15 PM Parker Claros MD [facility] PSA CLIN   12/31/2024  8:00 AM Fili Vasquez MD [facility] CS     Armida Paz, RN BSN   [facility] [place], [place]  C.O.R.E. Clinic Care Coordinator  08/13/24, 4:55 PM

## 2024-08-15 PROCEDURE — 93248 EXT ECG>7D<15D REV&INTERPJ: CPT | Performed by: INTERNAL MEDICINE

## 2024-10-15 ENCOUNTER — TRANSFERRED RECORDS (OUTPATIENT)
Dept: HEALTH INFORMATION MANAGEMENT | Facility: CLINIC | Age: 64
End: 2024-10-15
Payer: COMMERCIAL

## 2024-11-02 DIAGNOSIS — I50.23 ACUTE ON CHRONIC SYSTOLIC HEART FAILURE (H): ICD-10-CM

## 2024-11-04 RX ORDER — EMPAGLIFLOZIN 10 MG/1
10 TABLET, FILM COATED ORAL DAILY
Qty: 90 TABLET | Refills: 3 | Status: SHIPPED | OUTPATIENT
Start: 2024-11-04

## 2024-11-18 ENCOUNTER — HOSPITAL ENCOUNTER (OUTPATIENT)
Dept: CARDIOLOGY | Facility: CLINIC | Age: 64
Discharge: HOME | End: 2024-11-18
Attending: INTERNAL MEDICINE | Admitting: INTERNAL MEDICINE
Payer: COMMERCIAL

## 2024-11-18 ENCOUNTER — LAB (OUTPATIENT)
Dept: LAB | Facility: CLINIC | Age: 64
End: 2024-11-18
Payer: COMMERCIAL

## 2024-11-18 DIAGNOSIS — I42.9 CARDIOMYOPATHY, UNSPECIFIED TYPE (H): ICD-10-CM

## 2024-11-18 LAB
ANION GAP SERPL CALCULATED.3IONS-SCNC: 14 MMOL/L (ref 7–15)
BI-PLANE LVEF ECHO: NORMAL
BUN SERPL-MCNC: 14 MG/DL (ref 8–23)
CALCIUM SERPL-MCNC: 9.6 MG/DL (ref 8.8–10.4)
CHLORIDE SERPL-SCNC: 104 MMOL/L (ref 98–107)
CREAT SERPL-MCNC: 0.88 MG/DL (ref 0.51–0.95)
EGFRCR SERPLBLD CKD-EPI 2021: 72 ML/MIN/1.73M2
GLUCOSE SERPL-MCNC: 92 MG/DL (ref 70–99)
HCO3 SERPL-SCNC: 24 MMOL/L (ref 22–29)
POTASSIUM SERPL-SCNC: 3.9 MMOL/L (ref 3.4–5.3)
SODIUM SERPL-SCNC: 142 MMOL/L (ref 135–145)

## 2024-11-18 PROCEDURE — 93306 TTE W/DOPPLER COMPLETE: CPT | Mod: 26 | Performed by: INTERNAL MEDICINE

## 2024-11-18 PROCEDURE — 80048 BASIC METABOLIC PNL TOTAL CA: CPT | Performed by: INTERNAL MEDICINE

## 2024-11-18 PROCEDURE — 93306 TTE W/DOPPLER COMPLETE: CPT

## 2024-11-18 PROCEDURE — 36415 COLL VENOUS BLD VENIPUNCTURE: CPT | Performed by: INTERNAL MEDICINE

## 2024-11-27 ENCOUNTER — OFFICE VISIT (OUTPATIENT)
Dept: CARDIOLOGY | Facility: CLINIC | Age: 64
End: 2024-11-27
Attending: INTERNAL MEDICINE
Payer: COMMERCIAL

## 2024-11-27 VITALS
BODY MASS INDEX: 31.04 KG/M2 | DIASTOLIC BLOOD PRESSURE: 78 MMHG | HEART RATE: 61 BPM | OXYGEN SATURATION: 98 % | HEIGHT: 67 IN | WEIGHT: 197.8 LBS | SYSTOLIC BLOOD PRESSURE: 132 MMHG

## 2024-11-27 DIAGNOSIS — I25.10 CORONARY ARTERY DISEASE INVOLVING NATIVE CORONARY ARTERY OF NATIVE HEART WITHOUT ANGINA PECTORIS: Primary | ICD-10-CM

## 2024-11-27 DIAGNOSIS — I42.9 CARDIOMYOPATHY, UNSPECIFIED TYPE (H): ICD-10-CM

## 2024-11-27 DIAGNOSIS — I48.0 PAF (PAROXYSMAL ATRIAL FIBRILLATION) (H): ICD-10-CM

## 2024-11-27 DIAGNOSIS — I50.21 ACUTE SYSTOLIC CONGESTIVE HEART FAILURE (H): ICD-10-CM

## 2024-11-27 DIAGNOSIS — I48.3 TYPICAL ATRIAL FLUTTER (H): ICD-10-CM

## 2024-11-27 RX ORDER — FUROSEMIDE 40 MG/1
20 TABLET ORAL DAILY
Qty: 45 TABLET | Refills: 3 | Status: SHIPPED | OUTPATIENT
Start: 2024-11-27

## 2024-11-27 NOTE — LETTER
11/27/2024    Fili Vasquez MD, MD  [address]  [place] [address]    RE: Rhoda NIKKI Ayala       Dear Colleague,     I had the pleasure of seeing Rhoda Ayala in the [facility].  HPI and Plan:   It is my pleasure to see your patient Rhoda Castellon in follow-up.  This is a 64-year-old patient who presented last year with acute systolic congestive heart failure with an ejection fraction of 20 to 25%.  Coronary angiography showed trivial nonobstructive coronary artery disease.  This patient had episodes of paroxysmal atrial fibrillation and atrial flutter and also a history of SVT.  Patient was seen by EP and they felt that AV elizabeth blocking agents and anticoagulation would be appropriate at this time.  Optimal medical therapy was employed and gradually her ejection fraction has improved.  Her most recent echocardiogram which was performed on 18 November has shown that her ejection fraction is now in the 50 to 55% range.  I personally reviewed the echocardiogram today I do think that the EF is in the low normal range at 50 to 55% especially looking at the apical views.  Her heart size is now normal.  At the time of presentation her left ventricle was moderately dilated.  Her Zio patch monitor which was performed on 15 August showed no evidence of atrial fibrillation.  No other significant arrhythmias were noted.  Her renal function on spironolactone lisinopril and Jardiance as well as furosemide is normal.  Her lipids which were drawn on 2 May of this year showed an LDL of 52  HDL deficiency at 35 and mild hypertriglyceridemia of 216.  Liver function tests were normal indicating no hepatic toxicity.  Her blood pressure is well-controlled at 132/78 with a pulse of 61 bpm regular and rhythm and volume consistent with sinus rhythm.    Impression:  1.  Cardiomyopathy.  There has been impressive improvement in her left ventricular systolic function over the year.  I would assess her left  ventricular function to be in the low normal range now.  2.  Paroxysmal atrial fibrillation.  There was no evidence of atrial fibrillation on event monitoring in November.  3.  Trivial coronary artery disease.  She does have mixed hyperlipidemia but the LDL cholesterol is excellent and within secondary prevention guidelines.  4.  Normotensive.    Plan:  1.  I have the patient return to see me in 3 months time.  At that stage we will perform a limited echocardiogram.  If the left ventricular systolic function is in the normal range.  I would consider stopping the rosuvastatin and Jardiance but I would continue with the lisinopril and carvedilol indefinitely.  We may also be able to stop the furosemide.    As always the patient is been told to contact me if she is any questions or any concerns.    The longitudinal plan of care for the diagnosis(es)/condition(s) as documented were addressed during this visit. Due to the added complexity in care, I will continue to support Rhoda in the subsequent management and with ongoing continuity of care.    Parker Claros MD, FACC, FRCPI        Orders Placed This Encounter   Procedures     Basic metabolic panel     Lipid Profile     ALT     Basic metabolic panel     Lipid Profile     ALT     Follow-Up with Cardiology     Follow-Up with Cardiology     Echocardiogram Limited     Echocardiogram Limited       Orders Placed This Encounter   Medications     furosemide (LASIX) 40 MG tablet     Sig: Take 0.5 tablets (20 mg) by mouth daily.     Dispense:  45 tablet     Refill:  3       Medications Discontinued During This Encounter   Medication Reason     furosemide (LASIX) 40 MG tablet          Encounter Diagnoses   Name Primary?     Cardiomyopathy, unspecified type (H)      Acute systolic congestive heart failure (H)      Typical atrial flutter (H)      Coronary artery disease involving native coronary artery of native heart without angina pectoris Yes     PAF (paroxysmal atrial  fibrillation) (H)        CURRENT MEDICATIONS:  Current Outpatient Medications   Medication Sig Dispense Refill     apixaban ANTICOAGULANT (ELIQUIS ANTICOAGULANT) 5 MG tablet Take 1 tablet (5 mg) by mouth 2 times daily 180 tablet 4     budesonide (PULMICORT FLEXHALER) 180 MCG/ACT inhaler INHALE 2 PUFFS INTO THE LUNGS TWICE DAILY Due for appointment. Please schedule: [phone] (Patient taking differently: as needed. INHALE 2 PUFFS INTO THE LUNGS TWICE DAILY Due for appointment. Please schedule: [phone]) 1 each 11     carvedilol (COREG) 6.25 MG tablet Take 2 tablets (12.5 mg) by mouth 2 times daily (with meals) (Patient taking differently: Take by mouth. 1.5 tab twice a day) 360 tablet 4     cetirizine (ZYRTEC) 10 MG tablet Take 10 mg by mouth every morning       cyclobenzaprine (FLEXERIL) 5 MG tablet Take 1 tablet (5 mg) by mouth 3 times daily as needed for muscle spasms 42 tablet 11     furosemide (LASIX) 40 MG tablet Take 0.5 tablets (20 mg) by mouth daily. 45 tablet 3     guaiFENesin (MUCINEX) 600 MG 12 hr tablet Take 1,200 mg by mouth 2 times daily as needed for congestion       JARDIANCE 10 MG TABS tablet TAKE 1 TABLET(10 MG) BY MOUTH DAILY 90 tablet 3     Levothyroxine Sodium (LEVOTHROID PO) Take 175 mcg by mouth every evening 1 tab Tues - Sun. 0.5 tabs on Monday's.  (Follows with [facility]- Annual basis)       lisinopril (ZESTRIL) 10 MG tablet Take 1 tablet (10 mg) by mouth daily 90 tablet 3     melatonin 5 MG tablet Take 5 mg by mouth nightly as needed for sleep       rosuvastatin (CRESTOR) 20 MG tablet Take 1 tablet (20 mg) by mouth daily 90 tablet 4     spironolactone (ALDACTONE) 25 MG tablet Take 0.5 tablets (12.5 mg) by mouth daily 45 tablet 4     SUMAtriptan (IMITREX) 100 MG tablet TAKE 1 TABLET BY MOUTH AT ONSET OF HEADACHE AS DIRECTED 9 tablet 11     UNABLE TO FIND Nebulizer at home PRN       vitamin D3 (CHOLECALCIFEROL) 50 mcg (2000 units) tablet Take 1 tablet by mouth daily          ALLERGIES     Allergies   Allergen Reactions     Atorvastatin Muscle Pain (Myalgia)     Montelukast Sodium Unknown     Pravastatin      Edema, cramping       PAST MEDICAL HISTORY:  Past Medical History:   Diagnosis Date     Asthma      Cellulitis     s/p I&D of wound     Hypertension      Hypothyroidism      Lower extremity edema      Melanoma (H) 2015     S/P appendectomy      S/P arthroscopic knee surgery      S/P  section      S/P lateral meniscectomy of right knee      S/P JOSEP (total abdominal hysterectomy)      Seasonal allergies        PAST SURGICAL HISTORY:  Past Surgical History:   Procedure Laterality Date     APPENDECTOMY       COLONOSCOPY N/A 2016    Procedure: COLONOSCOPY;  Surgeon: Axel Rivera MD;  Location:  GI     CV CORONARY ANGIOGRAM N/A 10/11/2023    Procedure: Coronary Angiogram;  Surgeon: Reinaldo Woodward MD;  Location:  HEART CARDIAC CATH LAB     CV LEFT HEART CATH N/A 10/11/2023    Procedure: Left Heart Catheterization;  Surgeon: Reinaldo Woodward MD;  Location:  HEART CARDIAC CATH LAB     CV LEFT VENTRICULOGRAM N/A 10/11/2023    Procedure: Left Ventriculogram;  Surgeon: Reinaldo Woodward MD;  Location: [facility]     HYSTERECTOMY, PAP NO LONGER INDICATED         FAMILY HISTORY:  Family History   Problem Relation Age of Onset     Heart Failure Mother         later in life     Thyroid Disease Mother         graves disease     Arthritis Mother         Rheumatoid     Osteoporosis Mother      Rheumatoid Arthritis Mother      Hypertension Father      Heart Failure Father         later onset in life     Heart Surgery Father         stents palced x2     Mitral valve prolapse Father      Cerebrovascular Disease Maternal Grandmother          approx 72     Family History Negative Maternal Grandfather      Cerebrovascular Disease Paternal Grandmother          from     Alzheimer Disease Paternal Grandfather          from complications of      Hypertension Brother      Prostate Cancer Brother      Hypertension Sister      Hypothyroidism Sister      Family History Negative Son      Family History Negative Son      Obesity Son         Bariatric surgery 2023     Family History Negative Daughter      Colon Cancer No family hx of        SOCIAL HISTORY:  Social History     Socioeconomic History     Marital status: Single     Spouse name: None     Number of children: None     Years of education: None     Highest education level: None   Tobacco Use     Smoking status: Former     Current packs/day: 0.00     Average packs/day: 0.3 packs/day for 4.0 years (1.0 ttl pk-yrs)     Types: Cigarettes     Start date: 10/1/2019     Quit date: 10/1/2023     Years since quittin.1     Smokeless tobacco: Never   Vaping Use     Vaping status: Never Used   Substance and Sexual Activity     Alcohol use: Yes     Alcohol/week: 0.0 standard drinks of alcohol     Comment: rare use, 3-4 x per year     Drug use: No     Sexual activity: Yes     Partners: Male     Social Drivers of Health     Financial Resource Strain: Low Risk  (2023)    Financial Resource Strain      Within the past 12 months, have you or your family members you live with been unable to get utilities (heat, electricity) when it was really needed?: No   Food Insecurity: Low Risk  (2023)    Food Insecurity      Within the past 12 months, did you worry that your food would run out before you got money to buy more?: No      Within the past 12 months, did the food you bought just not last and you didn t have money to get more?: No   Transportation Needs: Low Risk  (2023)    Transportation Needs      Within the past 12 months, has lack of transportation kept you from medical appointments, getting your medicines, non-medical meetings or appointments, work, or from getting things that you need?: No   Interpersonal Safety: Low Risk  (2023)    Interpersonal Safety      Do you feel physically and  "emotionally safe where you currently live?: Yes      Within the past 12 months, have you been hit, slapped, kicked or otherwise physically hurt by someone?: No      Within the past 12 months, have you been humiliated or emotionally abused in other ways by your partner or ex-partner?: No   Housing Stability: Low Risk  (12/23/2023)    Housing Stability      Do you have housing? : Yes      Are you worried about losing your housing?: No   Recent Concern: Housing Stability - High Risk (10/18/2023)    Housing Stability      Do you have housing? : No      Are you worried about losing your housing?: No       Review of Systems:  Skin:          Eyes:         ENT:         Respiratory:  Negative       Cardiovascular:  Negative      Gastroenterology:        Genitourinary:         Musculoskeletal:         Neurologic:         Psychiatric:         Heme/Lymph/Imm:         Endocrine:           Physical Exam:  Vitals: /78 (BP Location: Right arm, Patient Position: Sitting, Cuff Size: Adult Regular)   Pulse 61   Ht 1.702 m (5' 7\")   Wt 89.7 kg (197 lb 12.8 oz)   SpO2 98%   BMI 30.98 kg/m      Constitutional:  cooperative, alert and oriented, well developed, well nourished, in no acute distress        Skin:  warm and dry to the touch          Head:  no masses or lesions        Eyes:  pupils equal and round        Lymph:No Cervical lymphadenopathy present     ENT:  no pallor or cyanosis        Neck:  carotid pulses are full and equal bilaterally, JVP normal, no carotid bruit        Respiratory:  normal breath sounds, clear to auscultation, normal A-P diameter, normal symmetry, normal respiratory excursion, no use of accessory muscles         Cardiac: regular rhythm;normal S1 and S2;no murmurs, gallops or rubs detected   S4            pulses full and equal                                        GI:           Extremities and Muscular Skeletal:  no deformities, clubbing, cyanosis, erythema observed;no edema          "     Neurological:  no gross motor deficits;affect appropriate        Psych:  Alert and Oriented x 3        CC  Yenny Stringer PA-C  [id number] [id number]                  Thank you for allowing me to participate in the care of your patient.      Sincerely,     Parker Claros MD, MD     [facility]  cc:   Yenny Srtinger PA-C  [id number] [id number]

## 2024-11-27 NOTE — PROGRESS NOTES
HPI and Plan:   It is my pleasure to see your patient Rhoda Castellon in follow-up.  This is a 64-year-old patient who presented last year with acute systolic congestive heart failure with an ejection fraction of 20 to 25%.  Coronary angiography showed trivial nonobstructive coronary artery disease.  This patient had episodes of paroxysmal atrial fibrillation and atrial flutter and also a history of SVT.  Patient was seen by EP and they felt that AV elizabeth blocking agents and anticoagulation would be appropriate at this time.  Optimal medical therapy was employed and gradually her ejection fraction has improved.  Her most recent echocardiogram which was performed on 18 November has shown that her ejection fraction is now in the 50 to 55% range.  I personally reviewed the echocardiogram today I do think that the EF is in the low normal range at 50 to 55% especially looking at the apical views.  Her heart size is now normal.  At the time of presentation her left ventricle was moderately dilated.  Her Zio patch monitor which was performed on 15 August showed no evidence of atrial fibrillation.  No other significant arrhythmias were noted.  Her renal function on spironolactone lisinopril and Jardiance as well as furosemide is normal.  Her lipids which were drawn on 2 May of this year showed an LDL of 52  HDL deficiency at 35 and mild hypertriglyceridemia of 216.  Liver function tests were normal indicating no hepatic toxicity.  Her blood pressure is well-controlled at 132/78 with a pulse of 61 bpm regular and rhythm and volume consistent with sinus rhythm.    Impression:  1.  Cardiomyopathy.  There has been impressive improvement in her left ventricular systolic function over the year.  I would assess her left ventricular function to be in the low normal range now.  2.  Paroxysmal atrial fibrillation.  There was no evidence of atrial fibrillation on event monitoring in November.  3.  Trivial coronary artery disease.  She  does have mixed hyperlipidemia but the LDL cholesterol is excellent and within secondary prevention guidelines.  4.  Normotensive.    Plan:  1.  I have the patient return to see me in 3 months time.  At that stage we will perform a limited echocardiogram.  If the left ventricular systolic function is in the normal range.  I would consider stopping the rosuvastatin and Jardiance but I would continue with the lisinopril and carvedilol indefinitely.  We may also be able to stop the furosemide.    As always the patient is been told to contact me if she is any questions or any concerns.    The longitudinal plan of care for the diagnosis(es)/condition(s) as documented were addressed during this visit. Due to the added complexity in care, I will continue to support Rhoda in the subsequent management and with ongoing continuity of care.    Parker Claros MD, FACC, FRCPI        Orders Placed This Encounter   Procedures    Basic metabolic panel    Lipid Profile    ALT    Basic metabolic panel    Lipid Profile    ALT    Follow-Up with Cardiology    Follow-Up with Cardiology    Echocardiogram Limited    Echocardiogram Limited       Orders Placed This Encounter   Medications    furosemide (LASIX) 40 MG tablet     Sig: Take 0.5 tablets (20 mg) by mouth daily.     Dispense:  45 tablet     Refill:  3       Medications Discontinued During This Encounter   Medication Reason    furosemide (LASIX) 40 MG tablet          Encounter Diagnoses   Name Primary?    Cardiomyopathy, unspecified type (H)     Acute systolic congestive heart failure (H)     Typical atrial flutter (H)     Coronary artery disease involving native coronary artery of native heart without angina pectoris Yes    PAF (paroxysmal atrial fibrillation) (H)        CURRENT MEDICATIONS:  Current Outpatient Medications   Medication Sig Dispense Refill    apixaban ANTICOAGULANT (ELIQUIS ANTICOAGULANT) 5 MG tablet Take 1 tablet (5 mg) by mouth 2 times daily 180 tablet 4     budesonide (PULMICORT FLEXHALER) 180 MCG/ACT inhaler INHALE 2 PUFFS INTO THE LUNGS TWICE DAILY Due for appointment. Please schedule: [phone] (Patient taking differently: as needed. INHALE 2 PUFFS INTO THE LUNGS TWICE DAILY Due for appointment. Please schedule: [phone]) 1 each 11    carvedilol (COREG) 6.25 MG tablet Take 2 tablets (12.5 mg) by mouth 2 times daily (with meals) (Patient taking differently: Take by mouth. 1.5 tab twice a day) 360 tablet 4    cetirizine (ZYRTEC) 10 MG tablet Take 10 mg by mouth every morning      cyclobenzaprine (FLEXERIL) 5 MG tablet Take 1 tablet (5 mg) by mouth 3 times daily as needed for muscle spasms 42 tablet 11    furosemide (LASIX) 40 MG tablet Take 0.5 tablets (20 mg) by mouth daily. 45 tablet 3    guaiFENesin (MUCINEX) 600 MG 12 hr tablet Take 1,200 mg by mouth 2 times daily as needed for congestion      JARDIANCE 10 MG TABS tablet TAKE 1 TABLET(10 MG) BY MOUTH DAILY 90 tablet 3    Levothyroxine Sodium (LEVOTHROID PO) Take 175 mcg by mouth every evening 1 tab Tues - Sun. 0.5 tabs on Monday's.  (Follows with [facility]- Annual basis)      lisinopril (ZESTRIL) 10 MG tablet Take 1 tablet (10 mg) by mouth daily 90 tablet 3    melatonin 5 MG tablet Take 5 mg by mouth nightly as needed for sleep      rosuvastatin (CRESTOR) 20 MG tablet Take 1 tablet (20 mg) by mouth daily 90 tablet 4    spironolactone (ALDACTONE) 25 MG tablet Take 0.5 tablets (12.5 mg) by mouth daily 45 tablet 4    SUMAtriptan (IMITREX) 100 MG tablet TAKE 1 TABLET BY MOUTH AT ONSET OF HEADACHE AS DIRECTED 9 tablet 11    UNABLE TO FIND Nebulizer at home PRN      vitamin D3 (CHOLECALCIFEROL) 50 mcg (2000 units) tablet Take 1 tablet by mouth daily         ALLERGIES     Allergies   Allergen Reactions    Atorvastatin Muscle Pain (Myalgia)    Montelukast Sodium Unknown    Pravastatin      Edema, cramping       PAST MEDICAL HISTORY:  Past Medical History:   Diagnosis Date    Asthma     Cellulitis     s/p  I&D of wound    Hypertension     Hypothyroidism     Lower extremity edema     Melanoma (H) 2015    S/P appendectomy     S/P arthroscopic knee surgery     S/P  section     S/P lateral meniscectomy of right knee     S/P JOSEP (total abdominal hysterectomy)     Seasonal allergies        PAST SURGICAL HISTORY:  Past Surgical History:   Procedure Laterality Date    APPENDECTOMY      COLONOSCOPY N/A 2016    Procedure: COLONOSCOPY;  Surgeon: Axel Rivera MD;  Location: [facility] GI    CV CORONARY ANGIOGRAM N/A 10/11/2023    Procedure: Coronary Angiogram;  Surgeon: Reinaldo Woodward MD;  Location: [facility]ARDI[facility] C[facility] LAB    CV LEFT HEART CATH N/A 10/11/2023    Procedure: Left Heart Catheterization;  Surgeon: Reinaldo Woodward MD;  Location: [facility]    CV LEFT VENTRICULOGRAM N/A 10/11/2023    Procedure: Left Ventriculogram;  Surgeon: Reinaldo Woodward MD;  Location: [facility]    HYSTERECTOMY, PAP NO LONGER INDICATED         FAMILY HISTORY:  Family History   Problem Relation Age of Onset    Heart Failure Mother         later in life    Thyroid Disease Mother         graves disease    Arthritis Mother         Rheumatoid    Osteoporosis Mother     Rheumatoid Arthritis Mother     Hypertension Father     Heart Failure Father         later onset in life    Heart Surgery Father         stents palced x2    Mitral valve prolapse Father     Cerebrovascular Disease Maternal Grandmother          approx 72    Family History Negative Maternal Grandfather     Cerebrovascular Disease Paternal Grandmother          from    Alzheimer Disease Paternal Grandfather          from complications of    Hypertension Brother     Prostate Cancer Brother     Hypertension Sister     Hypothyroidism Sister     Family History Negative Son     Family History Negative Son     Obesity Son         Bariatric surgery 2023    Family History Negative Daughter     Colon Cancer No family hx of         SOCIAL HISTORY:  Social History     Socioeconomic History    Marital status: Single     Spouse name: None    Number of children: None    Years of education: None    Highest education level: None   Tobacco Use    Smoking status: Former     Current packs/day: 0.00     Average packs/day: 0.3 packs/day for 4.0 years (1.0 ttl pk-yrs)     Types: Cigarettes     Start date: 10/1/2019     Quit date: 10/1/2023     Years since quittin.1    Smokeless tobacco: Never   Vaping Use    Vaping status: Never Used   Substance and Sexual Activity    Alcohol use: Yes     Alcohol/week: 0.0 standard drinks of alcohol     Comment: rare use, 3-4 x per year    Drug use: No    Sexual activity: Yes     Partners: Male     Social Drivers of Health     Financial Resource Strain: Low Risk  (2023)    Financial Resource Strain     Within the past 12 months, have you or your family members you live with been unable to get utilities (heat, electricity) when it was really needed?: No   Food Insecurity: Low Risk  (2023)    Food Insecurity     Within the past 12 months, did you worry that your food would run out before you got money to buy more?: No     Within the past 12 months, did the food you bought just not last and you didn t have money to get more?: No   Transportation Needs: Low Risk  (2023)    Transportation Needs     Within the past 12 months, has lack of transportation kept you from medical appointments, getting your medicines, non-medical meetings or appointments, work, or from getting things that you need?: No   Interpersonal Safety: Low Risk  (2023)    Interpersonal Safety     Do you feel physically and emotionally safe where you currently live?: Yes     Within the past 12 months, have you been hit, slapped, kicked or otherwise physically hurt by someone?: No     Within the past 12 months, have you been humiliated or emotionally abused in other ways by your partner or ex-partner?: No   Housing Stability: Low  "Risk  (12/23/2023)    Housing Stability     Do you have housing? : Yes     Are you worried about losing your housing?: No   Recent Concern: Housing Stability - High Risk (10/18/2023)    Housing Stability     Do you have housing? : No     Are you worried about losing your housing?: No       Review of Systems:  Skin:          Eyes:         ENT:         Respiratory:  Negative       Cardiovascular:  Negative      Gastroenterology:        Genitourinary:         Musculoskeletal:         Neurologic:         Psychiatric:         Heme/Lymph/Imm:         Endocrine:           Physical Exam:  Vitals: /78 (BP Location: Right arm, Patient Position: Sitting, Cuff Size: Adult Regular)   Pulse 61   Ht 1.702 m (5' 7\")   Wt 89.7 kg (197 lb 12.8 oz)   SpO2 98%   BMI 30.98 kg/m      Constitutional:  cooperative, alert and oriented, well developed, well nourished, in no acute distress        Skin:  warm and dry to the touch          Head:  no masses or lesions        Eyes:  pupils equal and round        Lymph:No Cervical lymphadenopathy present     ENT:  no pallor or cyanosis        Neck:  carotid pulses are full and equal bilaterally, JVP normal, no carotid bruit        Respiratory:  normal breath sounds, clear to auscultation, normal A-P diameter, normal symmetry, normal respiratory excursion, no use of accessory muscles         Cardiac: regular rhythm;normal S1 and S2;no murmurs, gallops or rubs detected   S4            pulses full and equal                                        GI:           Extremities and Muscular Skeletal:  no deformities, clubbing, cyanosis, erythema observed;no edema              Neurological:  no gross motor deficits;affect appropriate        Psych:  Alert and Oriented x 3        CC  Yenny Stringer PA-C  [id number] [id number]                "

## 2024-12-31 DIAGNOSIS — M54.9 UPPER BACK PAIN ON RIGHT SIDE: ICD-10-CM

## 2024-12-31 DIAGNOSIS — M79.602 ARM PAIN, LEFT: ICD-10-CM

## 2024-12-31 RX ORDER — CYCLOBENZAPRINE HCL 5 MG
5 TABLET ORAL 3 TIMES DAILY PRN
Qty: 42 TABLET | Refills: 11 | Status: SHIPPED | OUTPATIENT
Start: 2024-12-31

## 2025-01-26 ENCOUNTER — HEALTH MAINTENANCE LETTER (OUTPATIENT)
Age: 65
End: 2025-01-26

## 2025-02-25 DIAGNOSIS — I50.21 ACUTE SYSTOLIC CONGESTIVE HEART FAILURE (H): ICD-10-CM

## 2025-02-25 DIAGNOSIS — I48.92 ATRIAL FLUTTER (H): ICD-10-CM

## 2025-02-25 RX ORDER — SPIRONOLACTONE 25 MG/1
12.5 TABLET ORAL DAILY
Qty: 45 TABLET | Refills: 3 | Status: SHIPPED | OUTPATIENT
Start: 2025-02-25

## 2025-02-26 ENCOUNTER — HOSPITAL ENCOUNTER (OUTPATIENT)
Dept: CARDIOLOGY | Facility: CLINIC | Age: 65
Discharge: HOME | End: 2025-02-26
Attending: INTERNAL MEDICINE
Payer: COMMERCIAL

## 2025-02-26 ENCOUNTER — LAB (OUTPATIENT)
Dept: LAB | Facility: CLINIC | Age: 65
End: 2025-02-26
Payer: COMMERCIAL

## 2025-02-26 DIAGNOSIS — I25.10 CORONARY ARTERY DISEASE INVOLVING NATIVE CORONARY ARTERY OF NATIVE HEART WITHOUT ANGINA PECTORIS: ICD-10-CM

## 2025-02-26 DIAGNOSIS — I50.21 ACUTE SYSTOLIC CONGESTIVE HEART FAILURE (H): ICD-10-CM

## 2025-02-26 DIAGNOSIS — I42.9 CARDIOMYOPATHY, UNSPECIFIED TYPE (H): ICD-10-CM

## 2025-02-26 LAB
ALT SERPL W P-5'-P-CCNC: 29 U/L (ref 0–50)
ANION GAP SERPL CALCULATED.3IONS-SCNC: 8 MMOL/L (ref 7–15)
BUN SERPL-MCNC: 15.6 MG/DL (ref 8–23)
CALCIUM SERPL-MCNC: 9.9 MG/DL (ref 8.8–10.4)
CHLORIDE SERPL-SCNC: 104 MMOL/L (ref 98–107)
CHOLEST SERPL-MCNC: 135 MG/DL
CREAT SERPL-MCNC: 0.82 MG/DL (ref 0.51–0.95)
EGFRCR SERPLBLD CKD-EPI 2021: 78 ML/MIN/1.73M2
FASTING STATUS PATIENT QL REPORTED: YES
GLUCOSE SERPL-MCNC: 99 MG/DL (ref 70–99)
HCO3 SERPL-SCNC: 26 MMOL/L (ref 22–29)
HDLC SERPL-MCNC: 42 MG/DL
LDLC SERPL CALC-MCNC: 60 MG/DL
LVEF ECHO: NORMAL
NONHDLC SERPL-MCNC: 93 MG/DL
POTASSIUM SERPL-SCNC: 4.3 MMOL/L (ref 3.4–5.3)
SODIUM SERPL-SCNC: 138 MMOL/L (ref 135–145)
TRIGL SERPL-MCNC: 164 MG/DL

## 2025-02-26 PROCEDURE — 93325 DOPPLER ECHO COLOR FLOW MAPG: CPT

## 2025-03-10 ENCOUNTER — OFFICE VISIT (OUTPATIENT)
Dept: CARDIOLOGY | Facility: CLINIC | Age: 65
End: 2025-03-10
Attending: INTERNAL MEDICINE
Payer: COMMERCIAL

## 2025-03-10 VITALS
HEIGHT: 67 IN | WEIGHT: 202 LBS | BODY MASS INDEX: 31.71 KG/M2 | SYSTOLIC BLOOD PRESSURE: 126 MMHG | OXYGEN SATURATION: 97 % | DIASTOLIC BLOOD PRESSURE: 74 MMHG | HEART RATE: 62 BPM

## 2025-03-10 DIAGNOSIS — I42.9 CARDIOMYOPATHY, UNSPECIFIED TYPE (H): ICD-10-CM

## 2025-03-10 DIAGNOSIS — I25.10 CORONARY ARTERY DISEASE INVOLVING NATIVE CORONARY ARTERY OF NATIVE HEART WITHOUT ANGINA PECTORIS: ICD-10-CM

## 2025-03-10 DIAGNOSIS — I48.0 PAF (PAROXYSMAL ATRIAL FIBRILLATION) (H): ICD-10-CM

## 2025-03-10 DIAGNOSIS — I50.21 ACUTE SYSTOLIC CONGESTIVE HEART FAILURE (H): ICD-10-CM

## 2025-03-10 DIAGNOSIS — I48.3 TYPICAL ATRIAL FLUTTER (H): ICD-10-CM

## 2025-03-10 PROCEDURE — 3074F SYST BP LT 130 MM HG: CPT | Performed by: INTERNAL MEDICINE

## 2025-03-10 PROCEDURE — 3078F DIAST BP <80 MM HG: CPT | Performed by: INTERNAL MEDICINE

## 2025-03-10 PROCEDURE — 99214 OFFICE O/P EST MOD 30 MIN: CPT | Performed by: INTERNAL MEDICINE

## 2025-03-10 PROCEDURE — G2211 COMPLEX E/M VISIT ADD ON: HCPCS | Performed by: INTERNAL MEDICINE

## 2025-03-10 RX ORDER — FUROSEMIDE 40 MG/1
20 TABLET ORAL DAILY PRN
Qty: 45 TABLET | Refills: 3 | Status: SHIPPED | OUTPATIENT
Start: 2025-03-10

## 2025-03-10 RX ORDER — CARVEDILOL 6.25 MG/1
9.38 TABLET ORAL 2 TIMES DAILY WITH MEALS
Qty: 270 TABLET | Refills: 3 | Status: SHIPPED | OUTPATIENT
Start: 2025-03-10

## 2025-03-10 NOTE — PROGRESS NOTES
Cardiology Clinic Progress Note  Rhoda Ayala MRN# [id number]   YOB: 1960 Age: 64 year old   Primary Cardiologist: Dr. Claros  Reason for visit: CORE follow-up            Assessment and Plan:   Rhoda Ayala is a very pleasant 64 year old female who is here today for CORE follow-up.      1.  HFrEF and nonischemic cardiomyopathy, now with recovered EF  - LVEF: 20-25% on echo 10/2023 >> 50-55% on echo 2/2025  - Etiology: nonischemic  - Fluid status: euvolemic  - Diuretic regimen: Stop taking Lasix daily.  Take Lasix 20 mg once daily as needed for weight gain  - Ischemic evaluation: Cor angio 10/11/2023 revealed mild nonobstructive CAD  - Guideline directed medical therapy:  - Beta blocker: Carvedilol 9.375 mg twice daily  - ACEI/ARB/ARNI: Lisinopril 10 mg once daily   - Aldactone antagonist: Spironolactone 12.5 mg once daily  - SGLT2 inhibitor: Jardiance 10 mg once daily  -Counseled patient on sodium restriction  2.  Paroxysmal atrial arrhythmias including atrial fibrillation and SVT.  On Eliquis 5 mg twice daily for cardioembolic risk reduction. Well-managed on beta blocker therapy.   3.  Hypertension, well controlled  4.  Mild nonobstructive coronary artery disease by coronary angio 10/11/2023.  On statin.  5.  Hyperlipidemia, on rosuvastatin.  LDL 60  6.  Hypothyroidism, on levothyroxine      Changes today:   Stop using Lasix daily.  Take 20 mg once daily as needed for a weight gain of 3 pounds in 1 day or 5 pounds in 1 week.    Rhoda has been feeling remarkably well since her last visit.  She denies any symptoms concerning for angina or decompensated heart failure.  We reviewed the results of her most recent echocardiogram that was completed 2/26/2025 and revealed low normal LVEF 50 to 55%, normal RV size and function, and no hemodynamically significant valvular disease.  We discussed the option of discontinuing her Jardiance as recommended by Dr. Leobardo Figueredo know that her LVEF has normalized.   Patient prefers to continue her current GDMT regimen unchanged which is very reasonable.  She likely will not need daily diuretic use anymore, so we will change her Lasix 20 mg to as needed.       Given recovery of LVEF, Ms. Ayala can graduate from the CORE clinic and be seen as a general cardiology patient moving forward.    Follow-up in 6 months with Dr. Leobardo Figueredo.     Yenny Stringer PA-C  [facility]  Pager: [phone]          History of Presenting Illness:    Rhoda Ayala is a very pleasant 64 year old female with a history of hypothyroidism and hypertension.      Patient presented to [facility] ED 10/10/2023 with exertional shortness of breath and chest discomfort.  ECG on admission showed NSR with nonspecific T wave changes. Labs notable for elevated NT proBNP at 3228, troponin mildly elevated at 29 and flat.  Normal renal function and electrolytes.  Blood counts normal with exception of mildly elevated WBC at 11.6.  Echocardiogram 10/10/2023 revealed severely decreased LV systolic function with LVEF 20 to 25%, moderately dilated LV with severe global hypokinesia of the LV.  RV was normal with respect to size and function.  LA moderately dilated.  2+ MR was also noted.  Coronary angiogram was completed 10/11/2023 that revealed mild nonobstructive coronary artery disease.  Patient was having recurrent episodes of SVT during the case that did not resolve with Valsalva but did resolve with carotid sinus massage.  She was diuresed and medications were optimized. At time of discharge, patient was started on Lasix 40 mg once daily.  Referral to electrophysiology was recommended given evidence of AVNRT during her admission as well as cardiac event monitor placement at time of discharge.     Dr. Claros started patient on Eliquis 5 mg twice daily and uptitrated carvedilol to 9.375 mg twice daily after cardiac monitor revealed evidence of atrial fibrillation 11/13/2023. Ms. Ayala had  a repeat echocardiogram 11/13/2023 that showed LVEF 35-40% with moderate global hypokinesia of the LV, normal RV size and function, and mild to moderate MR (1-2+).      At the time of her CORE enrollment/hospital follow up appointment 12/2023, she was started on Jardiance. Additionally, Ziopatch monitor was recommended to evaluate burden of atrial fibrillation as well as ventricular rates while in AFIB given ongoing episodic palpitations.    Zio patch monitor was worn 12/10/2023 to 12/17/2023 and revealed NSR, average VR of 74 bpm.  There was no evidence of atrial fibrillation, but patient was noted to have episodes of SVT with the longest episode lasting 7 minutes and 30 seconds at an average rate of 122 bpm.  Patient was also noted to have occasional PVCs (1.5%) and occasional PACs (1.6%). I increased her beta blocker.      She was seen by EP, Dr. Moore, 2/7/2024 at which time her low burden of atrial arrhythmias was not felt to be contributing to her cardiomyopathy.       Repeat echocardiogram 3/11/2024 revealed improvement in LVEF to 40 to 45%.  Lisinopril was increased from 2.5 mg once daily to 5 mg once daily.      Most recent echocardiogram 2/26/2025 revealed low normal LVEF 50 to 55%, normal RV size and function, and no hemodynamically significant valvular disease.    Patient is here today for CORE follow-up. Ms. Ayala continues to feel well from a cardiac perspective.  Denies chest pain, palpitations, lightheadedness, dizziness, near-syncope or syncope.  No shortness of breath, orthopnea or PND.     Taking medications daily as prescribed.     Most recent labs were completed 2/26/2025.  BMP with normal renal function and electrolytes.  , triglycerides 164, HDL 42, LDL 60.     Blood pressure 126/74 and HR 62 in clinic today.     Not currently performing any routine exercise.  Is active at work, on her feet for 12 to 13 hours/day. Very rare alcohol consumption, maybe once yearly.  No illicit drug use.  No  tobacco use.         Social History       Social History     Socioeconomic History    Marital status: Single     Spouse name: Not on file    Number of children: Not on file    Years of education: Not on file    Highest education level: Not on file   Occupational History    Not on file   Tobacco Use    Smoking status: Former     Current packs/day: 0.00     Average packs/day: 0.3 packs/day for 4.0 years (1.0 ttl pk-yrs)     Types: Cigarettes     Start date: 10/1/2019     Quit date: 10/1/2023     Years since quittin.4    Smokeless tobacco: Never   Vaping Use    Vaping status: Never Used   Substance and Sexual Activity    Alcohol use: Yes     Alcohol/week: 0.0 standard drinks of alcohol     Comment: rare use, 3-4 x per year    Drug use: No    Sexual activity: Yes     Partners: Male   Other Topics Concern    Parent/sibling w/ CABG, MI or angioplasty before 65F 55M? Not Asked   Social History Narrative    Not on file     Social Drivers of Health     Financial Resource Strain: Low Risk  (2023)    Financial Resource Strain     Within the past 12 months, have you or your family members you live with been unable to get utilities (heat, electricity) when it was really needed?: No   Food Insecurity: Low Risk  (2023)    Food Insecurity     Within the past 12 months, did you worry that your food would run out before you got money to buy more?: No     Within the past 12 months, did the food you bought just not last and you didn t have money to get more?: No   Transportation Needs: Low Risk  (2023)    Transportation Needs     Within the past 12 months, has lack of transportation kept you from medical appointments, getting your medicines, non-medical meetings or appointments, work, or from getting things that you need?: No   Physical Activity: Not on file   Stress: Not on file   Social Connections: Not on file   Interpersonal Safety: Low Risk  (2023)    Interpersonal Safety     Do you feel physically and  "emotionally safe where you currently live?: Yes     Within the past 12 months, have you been hit, slapped, kicked or otherwise physically hurt by someone?: No     Within the past 12 months, have you been humiliated or emotionally abused in other ways by your partner or ex-partner?: No   Housing Stability: Low Risk  (12/23/2023)    Housing Stability     Do you have housing? : Yes     Are you worried about losing your housing?: No   Recent Concern: Housing Stability - High Risk (10/18/2023)    Housing Stability     Do you have housing? : No     Are you worried about losing your housing?: No            Review of Systems:   Please see HPI         Physical Exam:   Vitals: /74 (BP Location: Right arm, Patient Position: Sitting, Cuff Size: Adult Regular)   Pulse 62   Ht 1.702 m (5' 7\")   Wt 91.6 kg (202 lb)   SpO2 97%   BMI 31.64 kg/m     Wt Readings from Last 4 Encounters:   11/27/24 89.7 kg (197 lb 12.8 oz)   03/18/24 89.1 kg (196 lb 8 oz)   02/07/24 88.9 kg (196 lb)   01/09/24 89.6 kg (197 lb 9.6 oz)     GEN: well nourished, in no acute distress.  HEENT:  Pupils equal, round. Sclerae nonicteric.   NECK: Supple, no masses appreciated. No JVD  C/V:  Regular rate and rhythm, no murmur, rub or gallop.    RESP: Respirations are unlabored. Clear to auscultation bilaterally without wheezing, rales, or rhonchi.  GI: Abdomen soft, nontender.  EXTREM: no LE edema.  NEURO: Alert and oriented, cooperative.  SKIN: Warm and dry.        Data:   LIPID RESULTS:  Lab Results   Component Value Date    CHOL 135 02/26/2025    CHOL 154 12/09/2020    HDL 42 (L) 02/26/2025    HDL 34 (L) 12/09/2020    LDL 60 02/26/2025    LDL 65 12/09/2020    TRIG 164 (H) 02/26/2025    TRIG 276 (H) 12/09/2020    CHOLHDLRATIO 7.0 (H) 06/16/2014     LIVER ENZYME RESULTS:  Lab Results   Component Value Date    AST 24 12/26/2023    AST 21 12/09/2020    ALT 29 02/26/2025    ALT 50 12/09/2020     CBC RESULTS:  Lab Results   Component Value Date    WBC 8.3 " "12/26/2023    WBC 8.6 12/09/2020    RBC 4.52 12/26/2023    RBC 4.32 12/09/2020    HGB 13.5 12/26/2023    HGB 13.2 12/09/2020    HCT 40.9 12/26/2023    HCT 39.4 12/09/2020    MCV 91 12/26/2023    MCV 91 12/09/2020    MCH 29.9 12/26/2023    MCH 30.6 12/09/2020    MCHC 33.0 12/26/2023    MCHC 33.5 12/09/2020    RDW 11.9 12/26/2023    RDW 12.6 12/09/2020     12/26/2023     12/09/2020     BMP RESULTS:  Lab Results   Component Value Date     02/26/2025     12/09/2020    POTASSIUM 4.3 02/26/2025    POTASSIUM 4.2 02/23/2022    POTASSIUM 3.9 12/09/2020    CHLORIDE 104 02/26/2025    CHLORIDE 107 02/23/2022    CHLORIDE 106 12/09/2020    CO2 26 02/26/2025    CO2 23 02/23/2022    CO2 27 12/09/2020    ANIONGAP 8 02/26/2025    ANIONGAP 9 02/23/2022    ANIONGAP 6 12/09/2020    GLC 99 02/26/2025     (H) 02/23/2022     (H) 12/09/2020    BUN 15.6 02/26/2025    BUN 13 02/23/2022    BUN 12 12/09/2020    CR 0.82 02/26/2025    CR 0.84 12/09/2020    GFRESTIMATED 78 02/26/2025    GFRESTIMATED 75 12/09/2020    GFRESTBLACK 87 12/09/2020    DIANNA 9.9 02/26/2025    DIANNA 9.6 12/09/2020      A1C RESULTS:  Lab Results   Component Value Date    A1C 5.4 12/26/2023    A1C 5.7 (H) 12/21/2018     INR RESULTS:  No results found for: \"INR\"         Medications     Current Outpatient Medications   Medication Sig Dispense Refill    apixaban ANTICOAGULANT (ELIQUIS ANTICOAGULANT) 5 MG tablet Take 1 tablet (5 mg) by mouth 2 times daily. 180 tablet 3    budesonide (PULMICORT FLEXHALER) 180 MCG/ACT inhaler INHALE 2 PUFFS INTO THE LUNGS TWICE DAILY Due for appointment. Please schedule: [phone] (Patient taking differently: as needed. INHALE 2 PUFFS INTO THE LUNGS TWICE DAILY Due for appointment. Please schedule: [phone]) 1 each 11    carvedilol (COREG) 6.25 MG tablet Take 2 tablets (12.5 mg) by mouth 2 times daily (with meals) (Patient taking differently: Take by mouth. 1.5 tab twice a day) 360 tablet 4    cetirizine " (ZYRTEC) 10 MG tablet Take 10 mg by mouth every morning      cyclobenzaprine (FLEXERIL) 5 MG tablet TAKE 1 TABLET(5 MG) BY MOUTH THREE TIMES DAILY AS NEEDED FOR MUSCLE SPASMS 42 tablet 11    furosemide (LASIX) 40 MG tablet Take 0.5 tablets (20 mg) by mouth daily. 45 tablet 3    guaiFENesin (MUCINEX) 600 MG 12 hr tablet Take 1,200 mg by mouth 2 times daily as needed for congestion      JARDIANCE 10 MG TABS tablet TAKE 1 TABLET(10 MG) BY MOUTH DAILY 90 tablet 3    Levothyroxine Sodium (LEVOTHROID PO) Take 175 mcg by mouth every evening 1 tab Tues - Sun. 0.5 tabs on Monday's.  (Follow[facility]- Annual basis)      lisinopril (ZESTRIL) 10 MG tablet Take 1 tablet (10 mg) by mouth daily 90 tablet 3    melatonin 5 MG tablet Take 5 mg by mouth nightly as needed for sleep      rosuvastatin (CRESTOR) 20 MG tablet Take 1 tablet (20 mg) by mouth daily 90 tablet 4    spironolactone (ALDACTONE) 25 MG tablet Take 0.5 tablets (12.5 mg) by mouth daily. 45 tablet 3    SUMAtriptan (IMITREX) 100 MG tablet TAKE 1 TABLET BY MOUTH AT ONSET OF HEADACHE AS DIRECTED 9 tablet 11    UNABLE TO FIND Nebulizer at home PRN      vitamin D3 (CHOLECALCIFEROL) 50 mcg (2000 units) tablet Take 1 tablet by mouth daily            Past Medical History     Past Medical History:   Diagnosis Date    Asthma     Cellulitis     s/p I&D of wound    Hypertension     Hypothyroidism     Lower extremity edema     Melanoma (H) 2015    S/P appendectomy     S/P arthroscopic knee surgery     S/P  section     S/P lateral meniscectomy of right knee     S/P JOSEP (total abdominal hysterectomy)     Seasonal allergies      Past Surgical History:   Procedure Laterality Date    APPENDECTOMY      COLONOSCOPY N/A 2016    Procedure: COLONOSCOPY;  Surgeon: Axel Rivera MD;  Location:  GI    CV CORONARY ANGIOGRAM N/A 10/11/2023    Procedure: Coronary Angiogram;  Surgeon: Reinaldo Woodward MD;  Location:  HEART CARDIAC CATH LAB    CV LEFT HEART  CATH N/A 10/11/2023    Procedure: Left Heart Catheterization;  Surgeon: Reinaldo Woodward MD;  Location:  HEART CARDIAC CATH LAB    CV LEFT VENTRICULOGRAM N/A 10/11/2023    Procedure: Left Ventriculogram;  Surgeon: Reinaldo Woodward MD;  Location: [facility] HEART CARDIAC CATH LAB    HYSTERECTOMY, PAP NO LONGER INDICATED       Family History   Problem Relation Age of Onset    Heart Failure Mother         later in life    Thyroid Disease Mother         graves disease    Arthritis Mother         Rheumatoid    Osteoporosis Mother     Rheumatoid Arthritis Mother     Hypertension Father     Heart Failure Father         later onset in life    Heart Surgery Father         stents palced x2    Mitral valve prolapse Father     Cerebrovascular Disease Maternal Grandmother          approx 72    Family History Negative Maternal Grandfather     Cerebrovascular Disease Paternal Grandmother          from    Alzheimer Disease Paternal Grandfather          from complications of    Hypertension Brother     Prostate Cancer Brother     Hypertension Sister     Hypothyroidism Sister     Family History Negative Son     Family History Negative Son     Obesity Son         Bariatric surgery 2023    Family History Negative Daughter     Colon Cancer No family hx of             Allergies   Atorvastatin, Montelukast sodium, and Pravastatin    The longitudinal plan of care for the diagnosis(es)/condition(s) as documented were addressed during this visit. Due to the added complexity in care, I will continue to support Rhoda in the subsequent management and with ongoing continuity of care.     30 minutes spent on the date of the encounter doing chart review, history and exam, documentation and further activities as noted above    Yenny Stringer PA-C  [facility]  Pager: [phone]

## 2025-03-10 NOTE — PATIENT INSTRUCTIONS
Today you had a visit in the CORE clinic. CORE stands for Cardiomyopathy Optimization Rehabilitation Education. The CORE clinic will teach and help you to manage your heart failure and keep you out of the hospital. Call the CORE nurse for any questions or concerns at [phone]      Plan:  1. Medication changes:     Stop taking Lasix daily. Use 20 mg as needed for weight gain of 3 pounds in 1 day or 5 pounds in 1 week.     2. Follow up in 6 months, sooner if needed   -Scheduling phone number: [phone]    3. Continue low sodium diet (less than 2000 mg daily). If you eat less salt, you will retain less fluid.    4. Continue to weigh yourself daily. Call the CORE nurse if you develop signs concerning for fluid retention, including weight gain of over 3 pounds in 1 night or over 5 pounds in 1 week, increasing shortness of breath, or increasing swelling in the legs or abdomen.    It was great seeing you today!    Yenny Stringer PA-C  Physician Assistant  [facility]

## 2025-03-10 NOTE — LETTER
3/10/2025    Fili Vasquez MD, MD  [address]  [place] [address]    RE: Rhoda Ayala       Dear Colleague,     I had the pleasure of seeing Rhoda Ayala in the [facility].  Cardiology Clinic Progress Note  Rhoda Ayala MRN# [id number]   YOB: 1960 Age: 64 year old   Primary Cardiologist: Dr. Claros  Reason for visit: CORE follow-up            Assessment and Plan:   Rhoda Ayala is a very pleasant 64 year old female who is here today for CORE follow-up.      1.  HFrEF and nonischemic cardiomyopathy, now with recovered EF  - LVEF: 20-25% on echo 10/2023 >> 50-55% on echo 2/2025  - Etiology: nonischemic  - Fluid status: euvolemic  - Diuretic regimen: Stop taking Lasix daily.  Take Lasix 20 mg once daily as needed for weight gain  - Ischemic evaluation: Cor angio 10/11/2023 revealed mild nonobstructive CAD  - Guideline directed medical therapy:  - Beta blocker: Carvedilol 9.375 mg twice daily  - ACEI/ARB/ARNI: Lisinopril 10 mg once daily   - Aldactone antagonist: Spironolactone 12.5 mg once daily  - SGLT2 inhibitor: Jardiance 10 mg once daily  -Counseled patient on sodium restriction  2.  Paroxysmal atrial arrhythmias including atrial fibrillation and SVT.  On Eliquis 5 mg twice daily for cardioembolic risk reduction. Well-managed on beta blocker therapy.   3.  Hypertension, well controlled  4.  Mild nonobstructive coronary artery disease by coronary angio 10/11/2023.  On statin.  5.  Hyperlipidemia, on rosuvastatin.  LDL 60  6.  Hypothyroidism, on levothyroxine      Changes today:   Stop using Lasix daily.  Take 20 mg once daily as needed for a weight gain of 3 pounds in 1 day or 5 pounds in 1 week.    Rhoda has been feeling remarkably well since her last visit.  She denies any symptoms concerning for angina or decompensated heart failure.  We reviewed the results of her most recent echocardiogram that was completed 2/26/2025 and revealed low normal LVEF 50  to 55%, normal RV size and function, and no hemodynamically significant valvular disease.  We discussed the option of discontinuing her Jardiance as recommended by Dr. Leobardo Figueredo know that her LVEF has normalized.  Patient prefers to continue her current GDMT regimen unchanged which is very reasonable.  She likely will not need daily diuretic use anymore, so we will change her Lasix 20 mg to as needed.       Given recovery of LVEF, Ms. Ayala can graduate from the CORE clinic and be seen as a general cardiology patient moving forward.    Follow-up in 6 months with Dr. Leobardo Figueredo.     Yenny Stringer PA-C  [facility]  Pager: [phone]          History of Presenting Illness:    Rhoda Ayala is a very pleasant 64 year old female with a history of hypothyroidism and hypertension.      Patient [facility] to [facility] ED 10/10/2023 with exertional shortness of breath and chest discomfort.  ECG on admission showed NSR with nonspecific T wave changes. Labs notable for elevated NT proBNP at 3228, troponin mildly elevated at 29 and flat.  Normal renal function and electrolytes.  Blood counts normal with exception of mildly elevated WBC at 11.6.  Echocardiogram 10/10/2023 revealed severely decreased LV systolic function with LVEF 20 to 25%, moderately dilated LV with severe global hypokinesia of the LV.  RV was normal with respect to size and function.  LA moderately dilated.  2+ MR was also noted.  Coronary angiogram was completed 10/11/2023 that revealed mild nonobstructive coronary artery disease.  Patient was having recurrent episodes of SVT during the case that did not resolve with Valsalva but did resolve with carotid sinus massage.  She was diuresed and medications were optimized. At time of discharge, patient was started on Lasix 40 mg once daily.  Referral to electrophysiology was recommended given evidence of AVNRT during her admission as well as cardiac event monitor placement at time  of discharge.     Dr. Claros started patient on Eliquis 5 mg twice daily and uptitrated carvedilol to 9.375 mg twice daily after cardiac monitor revealed evidence of atrial fibrillation 11/13/2023. Ms. Ayala had a repeat echocardiogram 11/13/2023 that showed LVEF 35-40% with moderate global hypokinesia of the LV, normal RV size and function, and mild to moderate MR (1-2+).      At the time of her CORE enrollment/hospital follow up appointment 12/2023, she was started on Jardiance. Additionally, Ziopatch monitor was recommended to evaluate burden of atrial fibrillation as well as ventricular rates while in AFIB given ongoing episodic palpitations.    Zio patch monitor was worn 12/10/2023 to 12/17/2023 and revealed NSR, average VR of 74 bpm.  There was no evidence of atrial fibrillation, but patient was noted to have episodes of SVT with the longest episode lasting 7 minutes and 30 seconds at an average rate of 122 bpm.  Patient was also noted to have occasional PVCs (1.5%) and occasional PACs (1.6%). I increased her beta blocker.      She was seen by EP, Dr. Moore, 2/7/2024 at which time her low burden of atrial arrhythmias was not felt to be contributing to her cardiomyopathy.       Repeat echocardiogram 3/11/2024 revealed improvement in LVEF to 40 to 45%.  Lisinopril was increased from 2.5 mg once daily to 5 mg once daily.      Most recent echocardiogram 2/26/2025 revealed low normal LVEF 50 to 55%, normal RV size and function, and no hemodynamically significant valvular disease.    Patient is here today for CORE follow-up. Ms. Ayala continues to feel well from a cardiac perspective.  Denies chest pain, palpitations, lightheadedness, dizziness, near-syncope or syncope.  No shortness of breath, orthopnea or PND.     Taking medications daily as prescribed.     Most recent labs were completed 2/26/2025.  BMP with normal renal function and electrolytes.  , triglycerides 164, HDL 42, LDL 60.     Blood pressure  126/74 and HR 62 in clinic today.     Not currently performing any routine exercise.  Is active at work, on her feet for 12 to 13 hours/day. Very rare alcohol consumption, maybe once yearly.  No illicit drug use.  No tobacco use.         Social History       Social History     Socioeconomic History     Marital status: Single     Spouse name: Not on file     Number of children: Not on file     Years of education: Not on file     Highest education level: Not on file   Occupational History     Not on file   Tobacco Use     Smoking status: Former     Current packs/day: 0.00     Average packs/day: 0.3 packs/day for 4.0 years (1.0 ttl pk-yrs)     Types: Cigarettes     Start date: 10/1/2019     Quit date: 10/1/2023     Years since quittin.4     Smokeless tobacco: Never   Vaping Use     Vaping status: Never Used   Substance and Sexual Activity     Alcohol use: Yes     Alcohol/week: 0.0 standard drinks of alcohol     Comment: rare use, 3-4 x per year     Drug use: No     Sexual activity: Yes     Partners: Male   Other Topics Concern     Parent/sibling w/ CABG, MI or angioplasty before 65F 55M? Not Asked   Social History Narrative     Not on file     Social Drivers of Health     Financial Resource Strain: Low Risk  (2023)    Financial Resource Strain      Within the past 12 months, have you or your family members you live with been unable to get utilities (heat, electricity) when it was really needed?: No   Food Insecurity: Low Risk  (2023)    Food Insecurity      Within the past 12 months, did you worry that your food would run out before you got money to buy more?: No      Within the past 12 months, did the food you bought just not last and you didn t have money to get more?: No   Transportation Needs: Low Risk  (2023)    Transportation Needs      Within the past 12 months, has lack of transportation kept you from medical appointments, getting your medicines, non-medical meetings or appointments, work,  "or from getting things that you need?: No   Physical Activity: Not on file   Stress: Not on file   Social Connections: Not on file   Interpersonal Safety: Low Risk  (12/26/2023)    Interpersonal Safety      Do you feel physically and emotionally safe where you currently live?: Yes      Within the past 12 months, have you been hit, slapped, kicked or otherwise physically hurt by someone?: No      Within the past 12 months, have you been humiliated or emotionally abused in other ways by your partner or ex-partner?: No   Housing Stability: Low Risk  (12/23/2023)    Housing Stability      Do you have housing? : Yes      Are you worried about losing your housing?: No   Recent Concern: Housing Stability - High Risk (10/18/2023)    Housing Stability      Do you have housing? : No      Are you worried about losing your housing?: No            Review of Systems:   Please see HPI         Physical Exam:   Vitals: /74 (BP Location: Right arm, Patient Position: Sitting, Cuff Size: Adult Regular)   Pulse 62   Ht 1.702 m (5' 7\")   Wt 91.6 kg (202 lb)   SpO2 97%   BMI 31.64 kg/m     Wt Readings from Last 4 Encounters:   11/27/24 89.7 kg (197 lb 12.8 oz)   03/18/24 89.1 kg (196 lb 8 oz)   02/07/24 88.9 kg (196 lb)   01/09/24 89.6 kg (197 lb 9.6 oz)     GEN: well nourished, in no acute distress.  HEENT:  Pupils equal, round. Sclerae nonicteric.   NECK: Supple, no masses appreciated. No JVD  C/V:  Regular rate and rhythm, no murmur, rub or gallop.    RESP: Respirations are unlabored. Clear to auscultation bilaterally without wheezing, rales, or rhonchi.  GI: Abdomen soft, nontender.  EXTREM: no LE edema.  NEURO: Alert and oriented, cooperative.  SKIN: Warm and dry.        Data:   LIPID RESULTS:  Lab Results   Component Value Date    CHOL 135 02/26/2025    CHOL 154 12/09/2020    HDL 42 (L) 02/26/2025    HDL 34 (L) 12/09/2020    LDL 60 02/26/2025    LDL 65 12/09/2020    TRIG 164 (H) 02/26/2025    TRIG 276 (H) 12/09/2020    " "CHOLHDLRATIO 7.0 (H) 06/16/2014     LIVER ENZYME RESULTS:  Lab Results   Component Value Date    AST 24 12/26/2023    AST 21 12/09/2020    ALT 29 02/26/2025    ALT 50 12/09/2020     CBC RESULTS:  Lab Results   Component Value Date    WBC 8.3 12/26/2023    WBC 8.6 12/09/2020    RBC 4.52 12/26/2023    RBC 4.32 12/09/2020    HGB 13.5 12/26/2023    HGB 13.2 12/09/2020    HCT 40.9 12/26/2023    HCT 39.4 12/09/2020    MCV 91 12/26/2023    MCV 91 12/09/2020    MCH 29.9 12/26/2023    MCH 30.6 12/09/2020    MCHC 33.0 12/26/2023    MCHC 33.5 12/09/2020    RDW 11.9 12/26/2023    RDW 12.6 12/09/2020     12/26/2023     12/09/2020     BMP RESULTS:  Lab Results   Component Value Date     02/26/2025     12/09/2020    POTASSIUM 4.3 02/26/2025    POTASSIUM 4.2 02/23/2022    POTASSIUM 3.9 12/09/2020    CHLORIDE 104 02/26/2025    CHLORIDE 107 02/23/2022    CHLORIDE 106 12/09/2020    CO2 26 02/26/2025    CO2 23 02/23/2022    CO2 27 12/09/2020    ANIONGAP 8 02/26/2025    ANIONGAP 9 02/23/2022    ANIONGAP 6 12/09/2020    GLC 99 02/26/2025     (H) 02/23/2022     (H) 12/09/2020    BUN 15.6 02/26/2025    BUN 13 02/23/2022    BUN 12 12/09/2020    CR 0.82 02/26/2025    CR 0.84 12/09/2020    GFRESTIMATED 78 02/26/2025    GFRESTIMATED 75 12/09/2020    GFRESTBLACK 87 12/09/2020    DIANNA 9.9 02/26/2025    DIANNA 9.6 12/09/2020      A1C RESULTS:  Lab Results   Component Value Date    A1C 5.4 12/26/2023    A1C 5.7 (H) 12/21/2018     INR RESULTS:  No results found for: \"INR\"         Medications     Current Outpatient Medications   Medication Sig Dispense Refill     apixaban ANTICOAGULANT (ELIQUIS ANTICOAGULANT) 5 MG tablet Take 1 tablet (5 mg) by mouth 2 times daily. 180 tablet 3     budesonide (PULMICORT FLEXHALER) 180 MCG/ACT inhaler INHALE 2 PUFFS INTO THE LUNGS TWICE DAILY Due for appointment. Please schedule: [phone] (Patient taking differently: as needed. INHALE 2 PUFFS INTO THE LUNGS TWICE DAILY Due for " appointment. Please schedule: [phone]) 1 each 11     carvedilol (COREG) 6.25 MG tablet Take 2 tablets (12.5 mg) by mouth 2 times daily (with meals) (Patient taking differently: Take by mouth. 1.5 tab twice a day) 360 tablet 4     cetirizine (ZYRTEC) 10 MG tablet Take 10 mg by mouth every morning       cyclobenzaprine (FLEXERIL) 5 MG tablet TAKE 1 TABLET(5 MG) BY MOUTH THREE TIMES DAILY AS NEEDED FOR MUSCLE SPASMS 42 tablet 11     furosemide (LASIX) 40 MG tablet Take 0.5 tablets (20 mg) by mouth daily. 45 tablet 3     guaiFENesin (MUCINEX) 600 MG 12 hr tablet Take 1,200 mg by mouth 2 times daily as needed for congestion       JARDIANCE 10 MG TABS tablet TAKE 1 TABLET(10 MG) BY MOUTH DAILY 90 tablet 3     Levothyroxine Sodium (LEVOTHROID PO) Take 175 mcg by mouth every evening 1 tab Tues - Sun. 0.5 tabs on Monday's.  (Follows with [facility]- Annual basis)       lisinopril (ZESTRIL) 10 MG tablet Take 1 tablet (10 mg) by mouth daily 90 tablet 3     melatonin 5 MG tablet Take 5 mg by mouth nightly as needed for sleep       rosuvastatin (CRESTOR) 20 MG tablet Take 1 tablet (20 mg) by mouth daily 90 tablet 4     spironolactone (ALDACTONE) 25 MG tablet Take 0.5 tablets (12.5 mg) by mouth daily. 45 tablet 3     SUMAtriptan (IMITREX) 100 MG tablet TAKE 1 TABLET BY MOUTH AT ONSET OF HEADACHE AS DIRECTED 9 tablet 11     UNABLE TO FIND Nebulizer at home PRN       vitamin D3 (CHOLECALCIFEROL) 50 mcg (2000 units) tablet Take 1 tablet by mouth daily            Past Medical History     Past Medical History:   Diagnosis Date     Asthma      Cellulitis     s/p I&D of wound     Hypertension      Hypothyroidism      Lower extremity edema      Melanoma (H) 2015     S/P appendectomy      S/P arthroscopic knee surgery      S/P  section      S/P lateral meniscectomy of right knee      S/P JOSEP (total abdominal hysterectomy)      Seasonal allergies      Past Surgical History:   Procedure Laterality Date      APPENDECTOMY       COLONOSCOPY N/A 2016    Procedure: COLONOSCOPY;  Surgeon: Axel Rivera MD;  Location: [facility]     CV CORONARY ANGIOGRAM N/A 10/11/2023    Procedure: Coronary Angiogram;  Surgeon: Reinaldo Woodward MD;  Location: [facility]     CV LEFT HEART CATH N/A 10/11/2023    Procedure: Left Heart Catheterization;  Surgeon: Reinaldo Woodward MD;  Location: [facility]     CV LEFT VENTRICULOGRAM N/A 10/11/2023    Procedure: Left Ventriculogram;  Surgeon: Reinaldo Woodward MD;  Location: [facility]     HYSTERECTOMY, PAP NO LONGER INDICATED       Family History   Problem Relation Age of Onset     Heart Failure Mother         later in life     Thyroid Disease Mother         graves disease     Arthritis Mother         Rheumatoid     Osteoporosis Mother      Rheumatoid Arthritis Mother      Hypertension Father      Heart Failure Father         later onset in life     Heart Surgery Father         stents palced x2     Mitral valve prolapse Father      Cerebrovascular Disease Maternal Grandmother          approx 72     Family History Negative Maternal Grandfather      Cerebrovascular Disease Paternal Grandmother          from     Alzheimer Disease Paternal Grandfather          from complications of     Hypertension Brother      Prostate Cancer Brother      Hypertension Sister      Hypothyroidism Sister      Family History Negative Son      Family History Negative Son      Obesity Son         Bariatric surgery 2023     Family History Negative Daughter      Colon Cancer No family hx of             Allergies   Atorvastatin, Montelukast sodium, and Pravastatin    The longitudinal plan of care for the diagnosis(es)/condition(s) as documented were addressed during this visit. Due to the added complexity in care, I will continue to support Rhoda in the subsequent management and with ongoing continuity of care.     30 minutes spent on the date of the  encounter doing chart review, history and exam, documentation and further activities as noted above    CARMELA Stanley [facility]  Pager: [phone]      Thank you for allowing me to participate in the care of your patient.      Sincerely,     CARMELA Stanley [facility]  cc:   Parker Claros MD  [address]  [place] [id number]

## 2025-03-31 ENCOUNTER — MYC REFILL (OUTPATIENT)
Dept: CARDIOLOGY | Facility: CLINIC | Age: 65
End: 2025-03-31
Payer: COMMERCIAL

## 2025-03-31 DIAGNOSIS — I50.21 ACUTE SYSTOLIC CONGESTIVE HEART FAILURE (H): ICD-10-CM

## 2025-03-31 RX ORDER — ROSUVASTATIN CALCIUM 20 MG/1
20 TABLET, COATED ORAL DAILY
Qty: 90 TABLET | Refills: 1 | Status: SHIPPED | OUTPATIENT
Start: 2025-03-31

## 2025-06-02 ENCOUNTER — TRANSFERRED RECORDS (OUTPATIENT)
Dept: HEALTH INFORMATION MANAGEMENT | Facility: CLINIC | Age: 65
End: 2025-06-02
Payer: COMMERCIAL

## 2025-07-10 DIAGNOSIS — I42.9 CARDIOMYOPATHY, UNSPECIFIED TYPE (H): ICD-10-CM

## 2025-07-10 RX ORDER — LISINOPRIL 10 MG/1
10 TABLET ORAL DAILY
Qty: 90 TABLET | Refills: 3 | Status: SHIPPED | OUTPATIENT
Start: 2025-07-10

## 2025-08-07 ENCOUNTER — MYC REFILL (OUTPATIENT)
Dept: CARDIOLOGY | Facility: CLINIC | Age: 65
End: 2025-08-07
Payer: COMMERCIAL

## 2025-08-07 DIAGNOSIS — I50.21 ACUTE SYSTOLIC CONGESTIVE HEART FAILURE (H): ICD-10-CM

## 2025-08-07 RX ORDER — ROSUVASTATIN CALCIUM 20 MG/1
20 TABLET, COATED ORAL DAILY
Qty: 90 TABLET | Refills: 3 | Status: SHIPPED | OUTPATIENT
Start: 2025-08-07

## (undated) DEVICE — KIT HAND CONTROL ANGIOTOUCH ACIST 65CM AT-P65

## (undated) DEVICE — DEFIB PRO-PADZ LVP LQD GEL ADULT 8900-2105-01

## (undated) DEVICE — GUIDEWIRE VASC 0.035INX150CM INQWIRE J TIP IQ35F150J3F/A

## (undated) DEVICE — CATH DIAG 4FR ANG PIG 538453S

## (undated) DEVICE — MANIFOLD KIT ANGIO AUTOMATED 014613

## (undated) DEVICE — CATH ANGIO INFINITI 3DRC 4FRX100CM 538476

## (undated) DEVICE — INTRO SHEATH 4FRX10CM PINNACLE RSS402

## (undated) DEVICE — CATH DIAG 4FR JL 4.5 538417